# Patient Record
Sex: FEMALE | Race: WHITE | NOT HISPANIC OR LATINO | Employment: FULL TIME | URBAN - METROPOLITAN AREA
[De-identification: names, ages, dates, MRNs, and addresses within clinical notes are randomized per-mention and may not be internally consistent; named-entity substitution may affect disease eponyms.]

---

## 2020-01-12 ENCOUNTER — HOSPITAL ENCOUNTER (EMERGENCY)
Facility: HOSPITAL | Age: 45
Discharge: HOME | End: 2020-01-12
Attending: EMERGENCY MEDICINE | Admitting: EMERGENCY MEDICINE
Payer: COMMERCIAL

## 2020-01-12 ENCOUNTER — APPOINTMENT (EMERGENCY)
Dept: RADIOLOGY | Facility: HOSPITAL | Age: 45
End: 2020-01-12
Payer: COMMERCIAL

## 2020-01-12 VITALS
OXYGEN SATURATION: 96 % | RESPIRATION RATE: 18 BRPM | SYSTOLIC BLOOD PRESSURE: 147 MMHG | HEART RATE: 71 BPM | TEMPERATURE: 98.5 F | DIASTOLIC BLOOD PRESSURE: 60 MMHG

## 2020-01-12 DIAGNOSIS — N39.0 UTI (URINARY TRACT INFECTION): ICD-10-CM

## 2020-01-12 DIAGNOSIS — E86.0 DEHYDRATION: Primary | ICD-10-CM

## 2020-01-12 LAB
ALBUMIN SERPL BCP-MCNC: 3.5 G/DL (ref 3.5–5)
ALP SERPL-CCNC: 103 U/L (ref 46–116)
ALT SERPL W P-5'-P-CCNC: 22 U/L (ref 12–78)
ANION GAP SERPL CALCULATED.3IONS-SCNC: 8 MMOL/L (ref 4–13)
APTT PPP: 25 SECONDS (ref 23–37)
AST SERPL W P-5'-P-CCNC: 15 U/L (ref 5–45)
BACTERIA UR QL AUTO: ABNORMAL /HPF
BASE EX.OXY STD BLDV CALC-SCNC: 81.7 % (ref 60–80)
BASE EXCESS BLDV CALC-SCNC: 0.2 MMOL/L
BASOPHILS # BLD MANUAL: 0 THOUSAND/UL (ref 0–0.1)
BASOPHILS NFR MAR MANUAL: 0 % (ref 0–1)
BILIRUB SERPL-MCNC: 0.2 MG/DL (ref 0.2–1)
BILIRUB UR QL STRIP: NEGATIVE
BUN SERPL-MCNC: 11 MG/DL (ref 5–25)
CALCIUM SERPL-MCNC: 9.1 MG/DL (ref 8.3–10.1)
CHLORIDE SERPL-SCNC: 106 MMOL/L (ref 100–108)
CLARITY UR: CLEAR
CO2 SERPL-SCNC: 28 MMOL/L (ref 21–32)
COLOR UR: YELLOW
CREAT SERPL-MCNC: 0.62 MG/DL (ref 0.6–1.3)
EOSINOPHIL # BLD MANUAL: 0.06 THOUSAND/UL (ref 0–0.4)
EOSINOPHIL NFR BLD MANUAL: 1 % (ref 0–6)
ERYTHROCYTE [DISTWIDTH] IN BLOOD BY AUTOMATED COUNT: 12.8 % (ref 11.6–15.1)
GFR SERPL CREATININE-BSD FRML MDRD: 108 ML/MIN/1.73SQ M
GLUCOSE SERPL-MCNC: 76 MG/DL (ref 65–140)
GLUCOSE UR STRIP-MCNC: NEGATIVE MG/DL
HCO3 BLDV-SCNC: 26.3 MMOL/L (ref 24–30)
HCT VFR BLD AUTO: 47.4 % (ref 34.8–46.1)
HGB BLD-MCNC: 15.8 G/DL (ref 11.5–15.4)
HGB UR QL STRIP.AUTO: NEGATIVE
INR PPP: 0.93 (ref 0.84–1.19)
KETONES UR STRIP-MCNC: NEGATIVE MG/DL
LACTATE SERPL-SCNC: 1.3 MMOL/L (ref 0.5–2)
LEUKOCYTE ESTERASE UR QL STRIP: NEGATIVE
LIPASE SERPL-CCNC: 93 U/L (ref 73–393)
LYMPHOCYTES # BLD AUTO: 1.08 THOUSAND/UL (ref 0.6–4.47)
LYMPHOCYTES # BLD AUTO: 17 % (ref 14–44)
MAGNESIUM SERPL-MCNC: 2 MG/DL (ref 1.6–2.6)
MCH RBC QN AUTO: 29.3 PG (ref 26.8–34.3)
MCHC RBC AUTO-ENTMCNC: 33.3 G/DL (ref 31.4–37.4)
MCV RBC AUTO: 88 FL (ref 82–98)
MONOCYTES # BLD AUTO: 0.32 THOUSAND/UL (ref 0–1.22)
MONOCYTES NFR BLD: 5 % (ref 4–12)
NEUTROPHILS # BLD MANUAL: 4.88 THOUSAND/UL (ref 1.85–7.62)
NEUTS BAND NFR BLD MANUAL: 1 % (ref 0–8)
NEUTS SEG NFR BLD AUTO: 76 % (ref 43–75)
NITRITE UR QL STRIP: POSITIVE
NON-SQ EPI CELLS URNS QL MICRO: ABNORMAL /HPF
NRBC BLD AUTO-RTO: 0 /100 WBCS
NT-PROBNP SERPL-MCNC: 1029 PG/ML
O2 CT BLDV-SCNC: 19.1 ML/DL
PCO2 BLDV: 47.5 MM HG (ref 42–50)
PH BLDV: 7.36 [PH] (ref 7.3–7.4)
PH UR STRIP.AUTO: 5.5 [PH]
PLATELET # BLD AUTO: 349 THOUSANDS/UL (ref 149–390)
PLATELET BLD QL SMEAR: ABNORMAL
PMV BLD AUTO: 9.4 FL (ref 8.9–12.7)
PO2 BLDV: 48.8 MM HG (ref 35–45)
POTASSIUM SERPL-SCNC: 4 MMOL/L (ref 3.5–5.3)
PROT SERPL-MCNC: 8.1 G/DL (ref 6.4–8.2)
PROT UR STRIP-MCNC: NEGATIVE MG/DL
PROTHROMBIN TIME: 12.5 SECONDS (ref 11.6–14.5)
RBC # BLD AUTO: 5.39 MILLION/UL (ref 3.81–5.12)
RBC #/AREA URNS AUTO: ABNORMAL /HPF
SODIUM SERPL-SCNC: 142 MMOL/L (ref 136–145)
SP GR UR STRIP.AUTO: 1.01 (ref 1–1.03)
TOTAL CELLS COUNTED SPEC: 100
TROPONIN I SERPL-MCNC: <0.02 NG/ML
UROBILINOGEN UR QL STRIP.AUTO: 0.2 E.U./DL
WBC # BLD AUTO: 6.34 THOUSAND/UL (ref 4.31–10.16)
WBC #/AREA URNS AUTO: ABNORMAL /HPF

## 2020-01-12 PROCEDURE — 85007 BL SMEAR W/DIFF WBC COUNT: CPT | Performed by: EMERGENCY MEDICINE

## 2020-01-12 PROCEDURE — 83735 ASSAY OF MAGNESIUM: CPT | Performed by: EMERGENCY MEDICINE

## 2020-01-12 PROCEDURE — 99284 EMERGENCY DEPT VISIT MOD MDM: CPT | Performed by: EMERGENCY MEDICINE

## 2020-01-12 PROCEDURE — 85610 PROTHROMBIN TIME: CPT | Performed by: EMERGENCY MEDICINE

## 2020-01-12 PROCEDURE — 84484 ASSAY OF TROPONIN QUANT: CPT | Performed by: EMERGENCY MEDICINE

## 2020-01-12 PROCEDURE — 36415 COLL VENOUS BLD VENIPUNCTURE: CPT | Performed by: EMERGENCY MEDICINE

## 2020-01-12 PROCEDURE — 80053 COMPREHEN METABOLIC PANEL: CPT | Performed by: EMERGENCY MEDICINE

## 2020-01-12 PROCEDURE — 83880 ASSAY OF NATRIURETIC PEPTIDE: CPT | Performed by: EMERGENCY MEDICINE

## 2020-01-12 PROCEDURE — 83605 ASSAY OF LACTIC ACID: CPT | Performed by: EMERGENCY MEDICINE

## 2020-01-12 PROCEDURE — 99285 EMERGENCY DEPT VISIT HI MDM: CPT

## 2020-01-12 PROCEDURE — 87040 BLOOD CULTURE FOR BACTERIA: CPT | Performed by: EMERGENCY MEDICINE

## 2020-01-12 PROCEDURE — 71046 X-RAY EXAM CHEST 2 VIEWS: CPT

## 2020-01-12 PROCEDURE — 96361 HYDRATE IV INFUSION ADD-ON: CPT

## 2020-01-12 PROCEDURE — 81001 URINALYSIS AUTO W/SCOPE: CPT | Performed by: EMERGENCY MEDICINE

## 2020-01-12 PROCEDURE — 93005 ELECTROCARDIOGRAM TRACING: CPT

## 2020-01-12 PROCEDURE — 84145 PROCALCITONIN (PCT): CPT | Performed by: EMERGENCY MEDICINE

## 2020-01-12 PROCEDURE — 85027 COMPLETE CBC AUTOMATED: CPT | Performed by: EMERGENCY MEDICINE

## 2020-01-12 PROCEDURE — 85730 THROMBOPLASTIN TIME PARTIAL: CPT | Performed by: EMERGENCY MEDICINE

## 2020-01-12 PROCEDURE — 82805 BLOOD GASES W/O2 SATURATION: CPT | Performed by: EMERGENCY MEDICINE

## 2020-01-12 PROCEDURE — 96374 THER/PROPH/DIAG INJ IV PUSH: CPT

## 2020-01-12 PROCEDURE — 83690 ASSAY OF LIPASE: CPT | Performed by: EMERGENCY MEDICINE

## 2020-01-12 RX ORDER — ALBUTEROL SULFATE 90 UG/1
2 AEROSOL, METERED RESPIRATORY (INHALATION) ONCE
Status: COMPLETED | OUTPATIENT
Start: 2020-01-12 | End: 2020-01-12

## 2020-01-12 RX ORDER — SODIUM CHLORIDE 9 MG/ML
3 INJECTION INTRAVENOUS AS NEEDED
Status: DISCONTINUED | OUTPATIENT
Start: 2020-01-12 | End: 2020-01-12 | Stop reason: HOSPADM

## 2020-01-12 RX ORDER — CEPHALEXIN 500 MG/1
500 CAPSULE ORAL EVERY 6 HOURS SCHEDULED
Qty: 28 CAPSULE | Refills: 0 | Status: SHIPPED | OUTPATIENT
Start: 2020-01-12 | End: 2020-01-19

## 2020-01-12 RX ORDER — DEXAMETHASONE SODIUM PHOSPHATE 10 MG/ML
10 INJECTION, SOLUTION INTRAMUSCULAR; INTRAVENOUS ONCE
Status: COMPLETED | OUTPATIENT
Start: 2020-01-12 | End: 2020-01-12

## 2020-01-12 RX ORDER — CEPHALEXIN 250 MG/1
500 CAPSULE ORAL ONCE
Status: COMPLETED | OUTPATIENT
Start: 2020-01-12 | End: 2020-01-12

## 2020-01-12 RX ADMIN — SODIUM CHLORIDE 1000 ML: 0.9 INJECTION, SOLUTION INTRAVENOUS at 16:47

## 2020-01-12 RX ADMIN — DEXAMETHASONE SODIUM PHOSPHATE 10 MG: 10 INJECTION, SOLUTION INTRAMUSCULAR; INTRAVENOUS at 20:06

## 2020-01-12 RX ADMIN — ALBUTEROL SULFATE 2 PUFF: 90 AEROSOL, METERED RESPIRATORY (INHALATION) at 20:06

## 2020-01-12 RX ADMIN — CEPHALEXIN 500 MG: 250 CAPSULE ORAL at 20:06

## 2020-01-12 RX ADMIN — SODIUM CHLORIDE 1000 ML: 0.9 INJECTION, SOLUTION INTRAVENOUS at 16:49

## 2020-01-12 NOTE — ED PROVIDER NOTES
History  Chief Complaint   Patient presents with    Weakness - Generalized     Pt presents to ED with co "my lungs hurt really bad, I have chest congestion, weakness and body aches " PT reports symptoms started 2 weeks  53year old female patient presents emergency department for evaluation of two weeks of generalized malaise fatigue and weakness  Patient states that she is off of all of her medications because she has been for medical   Patient states that because of financial difficulties she does not have any food in her house, she does not have access to medications, but she has not been able to care for herself or for her  who she is the primary caretaker for  Patient does smoke cigarettes, states she has not been able to for those as well  Currently the patient is cachectic, ill-appearing, generally unwell, well full septic evaluation done with a differential diagnosis to include but not be limited to pneumonia, dehydration, calorie deficiency secondary to inability to afford food  Patient be given food, during the time of her evaluation, and we will determine discharge based on findings  The patient states she cannot stay in the hospital however because her  is dependent on her for her his care  History provided by:  Patient   used: No    Medical Problem   Severity:  Mild  Onset quality:  Gradual  Timing:  Constant  Progression:  Worsening  Chronicity:  New  Associated symptoms: chest pain, headaches, myalgias and shortness of breath    Associated symptoms: no ear pain, no fever, no rhinorrhea and no sore throat        Prior to Admission Medications   Prescriptions Last Dose Informant Patient Reported? Taking?   naproxen (NAPROSYN) 500 mg tablet   No No   Sig: Take 1 tablet by mouth 2 (two) times a day with meals      Facility-Administered Medications: None       History reviewed  No pertinent past medical history      Past Surgical History:   Procedure Laterality Date    HYSTERECTOMY      KNEE SURGERY      LEG SURGERY         History reviewed  No pertinent family history  I have reviewed and agree with the history as documented  Social History     Tobacco Use    Smoking status: Current Every Day Smoker     Packs/day: 0 25     Types: Cigarettes    Smokeless tobacco: Never Used   Substance Use Topics    Alcohol use: No    Drug use: No        Review of Systems   Constitutional: Negative for fever  HENT: Negative for ear pain, rhinorrhea and sore throat  Respiratory: Positive for shortness of breath  Cardiovascular: Positive for chest pain  Musculoskeletal: Positive for myalgias  Neurological: Positive for headaches  All other systems reviewed and are negative  Physical Exam  Physical Exam   Constitutional: She is oriented to person, place, and time  She appears well-developed and well-nourished  She appears cachectic  She has a sickly appearance  HENT:   Head: Normocephalic and atraumatic  Right Ear: External ear normal    Left Ear: External ear normal    Eyes: Conjunctivae and EOM are normal    Neck: No JVD present  No tracheal deviation present  No thyromegaly present  Cardiovascular: Normal rate  Pulmonary/Chest: Effort normal and breath sounds normal  No stridor  Abdominal: Soft  She exhibits no distension and no mass  There is no tenderness  There is no guarding  No hernia  Musculoskeletal: Normal range of motion  She exhibits no edema, tenderness or deformity  Lymphadenopathy:     She has no cervical adenopathy  Neurological: She is alert and oriented to person, place, and time  Skin: Skin is warm  No rash noted  No erythema  No pallor  Psychiatric: She has a normal mood and affect  Her behavior is normal    Nursing note and vitals reviewed        Vital Signs  ED Triage Vitals [01/12/20 1457]   Temperature Pulse Respirations Blood Pressure SpO2   98 5 °F (36 9 °C) 77 20 169/84 97 %      Temp Source Heart Rate Source Patient Position - Orthostatic VS BP Location FiO2 (%)   Oral Monitor Sitting Left arm --      Pain Score       --           Vitals:    01/12/20 1845 01/12/20 1900 01/12/20 1930 01/12/20 2000   BP: 149/85 136/63 137/67 147/60   Pulse: 68 65 65 71   Patient Position - Orthostatic VS:             Visual Acuity      ED Medications  Medications   sodium chloride 0 9 % bolus 1,000 mL (0 mL Intravenous Stopped 1/12/20 2104)     Followed by   sodium chloride 0 9 % bolus 1,000 mL (0 mL Intravenous Stopped 1/12/20 1856)   cephalexin (KEFLEX) capsule 500 mg (500 mg Oral Given 1/12/20 2006)   dexamethasone (PF) (DECADRON) injection 10 mg (10 mg Intravenous Given 1/12/20 2006)   albuterol (PROVENTIL HFA,VENTOLIN HFA) inhaler 2 puff (2 puffs Inhalation Given 1/12/20 2006)       Diagnostic Studies  Results Reviewed     Procedure Component Value Units Date/Time    Procalcitonin [[id number]]  (Normal) Collected:  01/12/20 1644    Lab Status:  Final result Specimen:  Blood from Arm, Right Updated:  01/13/20 0016     Procalcitonin <0 05 ng/ml     Blood culture [[id number]] Collected:  01/12/20 1644    Lab Status:  Preliminary result Specimen:  Blood from Arm, Right Updated:  01/13/20 0001     Blood Culture Received in Microbiology Lab  Culture in Progress  Blood culture [[id number]] Collected:  01/12/20 1644    Lab Status:  Preliminary result Specimen:  Blood from Arm, Left Updated:  01/13/20 0001     Blood Culture Received in Microbiology Lab  Culture in Progress      Urine Microscopic [[id number]]  (Abnormal) Collected:  01/12/20 1823    Lab Status:  Final result Specimen:  Urine, Clean Catch Updated:  01/12/20 1840     RBC, UA None Seen /hpf      WBC, UA 1-2 /hpf      Epithelial Cells Occasional /hpf      Bacteria, UA Moderate /hpf     CBC and differential [[id number]]  (Abnormal) Collected:  01/12/20 1644    Lab Status:  Final result Specimen:  Blood from Arm, Right Updated:  01/12/20 1837     WBC 6 34 Thousand/uL      RBC 5 39 Million/uL      Hemoglobin 15 8 g/dL      Hematocrit 47 4 %      MCV 88 fL      MCH 29 3 pg      MCHC 33 3 g/dL      RDW 12 8 %      MPV 9 4 fL      Platelets 782 Thousands/uL      nRBC 0 /100 WBCs     Narrative: This is an appended report  These results have been appended to a previously verified report  Urinalysis with culture and sensitivity reflex [[id number]]  (Abnormal) Collected:  01/12/20 1823    Lab Status:  Final result Specimen:  Urine, Clean Catch Updated:  01/12/20 1829     Color, UA Yellow     Clarity, UA Clear     Specific Gravity, UA 1 015     pH, UA 5 5     Leukocytes, UA Negative     Nitrite, UA Positive     Protein, UA Negative mg/dl      Glucose, UA Negative mg/dl      Ketones, UA Negative mg/dl      Urobilinogen, UA 0 2 E U /dl      Bilirubin, UA Negative     Blood, UA Negative    Protime-INR [[id number]]  (Normal) Collected:  01/12/20 1644    Lab Status:  Final result Specimen:  Blood from Arm, Right Updated:  01/12/20 1826     Protime 12 5 seconds      INR 0 93    APTT [[id number]]  (Normal) Collected:  01/12/20 1644    Lab Status:  Final result Specimen:  Blood from Arm, Right Updated:  01/12/20 1826     PTT 25 seconds     B-type natriuretic peptide [[id number]]  (Abnormal) Collected:  01/12/20 1644    Lab Status:  Final result Specimen:  Blood from Arm, Right Updated:  01/12/20 1819     NT-proBNP 1,029 pg/mL     Lipase [[id number]]  (Normal) Collected:  01/12/20 1644    Lab Status:  Final result Specimen:  Blood from Arm, Right Updated:  01/12/20 1819     Lipase 93 u/L     Magnesium [[id number]]  (Normal) Collected:  01/12/20 1644    Lab Status:  Final result Specimen:  Blood from Arm, Right Updated:  01/12/20 1818     Magnesium 2 0 mg/dL     Lactate Blood [[id number]]  (Normal) Collected:  01/12/20 1750    Lab Status:  Final result Specimen:  Blood Updated:  01/12/20 1814     LACTIC ACID 1 3 mmol/L     Narrative:       Result may be elevated if tourniquet was used during collection      Troponin I [[id number]]  (Normal) Collected:  01/12/20 1644    Lab Status:  Final result Specimen:  Blood from Arm, Right Updated:  01/12/20 1814     Troponin I <0 02 ng/mL     Comprehensive metabolic panel [[id number]] Collected:  01/12/20 1644    Lab Status:  Final result Specimen:  Blood from Arm, Right Updated:  01/12/20 1811     Sodium 142 mmol/L      Potassium 4 0 mmol/L      Chloride 106 mmol/L      CO2 28 mmol/L      ANION GAP 8 mmol/L      BUN 11 mg/dL      Creatinine 0 62 mg/dL      Glucose 76 mg/dL      Calcium 9 1 mg/dL      AST 15 U/L      ALT 22 U/L      Alkaline Phosphatase 103 U/L      Total Protein 8 1 g/dL      Albumin 3 5 g/dL      Total Bilirubin 0 20 mg/dL      eGFR 108 ml/min/1 73sq m     Narrative:       [place] guidelines for Chronic Kidney Disease (CKD):     Stage 1 with normal or high GFR (GFR > 90 mL/min/1 73 square meters)    Stage 2 Mild CKD (GFR = 60-89 mL/min/1 73 square meters)    Stage 3A Moderate CKD (GFR = 45-59 mL/min/1 73 square meters)    Stage 3B Moderate CKD (GFR = 30-44 mL/min/1 73 square meters)    Stage 4 Severe CKD (GFR = 15-29 mL/min/1 73 square meters)    Stage 5 End Stage CKD (GFR <15 mL/min/1 73 square meters)  Note: GFR calculation is accurate only with a steady state creatinine    Blood gas, venous [[id number]]  (Abnormal) Collected:  01/12/20 1644    Lab Status:  Final result Specimen:  Blood from Arm, Right Updated:  01/12/20 1753     pH, [place] 7 361     pCO2, [place] 47 5 mm Hg      pO2, [place]an 48 8 mm Hg      HCO3, [place] 26 3 mmol/L      Base Excess, Beltran 0 2 mmol/L      O2 Content, [place] 19 1 ml/dL      O2 HGB, VENOUS 81 7 %                  XR chest 2 views    (Results Pending)              Procedures  Procedures         ED Course                               MDM  Number of Diagnoses or Management Options  Dehydration: new and requires workup  UTI (urinary tract infection): new and requires workup     Amount and/or Complexity of Data Reviewed  Clinical lab tests: ordered and reviewed  Tests in the radiology section of CPT®: reviewed and ordered  Decide to obtain previous medical records or to obtain history from someone other than the patient: yes  Review and summarize past medical records: yes    Patient Progress  Patient progress: stable        Disposition  Final diagnoses:   Dehydration   UTI (urinary tract infection)     Time reflects when diagnosis was documented in both MDM as applicable and the Disposition within this note     Time User Action Codes Description Comment    1/12/2020  7:48 PM Lewis Pack Add [E86 0] Dehydration     1/12/2020  7:48 PM Lewis Pack Add [N39 0] UTI (urinary tract infection)       ED Disposition     ED Disposition Condition Date/Time Comment    Discharge Stable Sun Jan 12, 2020  7:48 PM Mis Arreguin discharge to home/self care  Follow-up Information     Follow up With Specialties Details Why Contact Info Additional Information    [facility] Emergency Medicine  As needed [address] [address] ED, [address] [place], [place], [facility]          Discharge Medication List as of 1/12/2020  7:49 PM      START taking these medications    Details   cephalexin (KEFLEX) 500 mg capsule Take 1 capsule (500 mg total) by mouth every 6 (six) hours for 7 days, Starting Sun 1/12/2020, Until Sun 1/19/2020, Print         CONTINUE these medications which have NOT CHANGED    Details   naproxen (NAPROSYN) 500 mg tablet Take 1 tablet by mouth 2 (two) times a day with meals, Starting 11/19/2016, Until Discontinued, Print           No discharge procedures on file      ED Provider  Electronically Signed by           Milad Andres DO  01/13/20 0128

## 2020-01-13 LAB
ATRIAL RATE: 62 BPM
P AXIS: 53 DEGREES
PR INTERVAL: 150 MS
PROCALCITONIN SERPL-MCNC: <0.05 NG/ML
QRS AXIS: 27 DEGREES
QRSD INTERVAL: 146 MS
QT INTERVAL: 470 MS
QTC INTERVAL: 477 MS
T WAVE AXIS: 81 DEGREES
VENTRICULAR RATE: 62 BPM

## 2020-01-13 PROCEDURE — 93010 ELECTROCARDIOGRAM REPORT: CPT | Performed by: INTERNAL MEDICINE

## 2020-01-17 LAB
BACTERIA BLD CULT: NORMAL
BACTERIA BLD CULT: NORMAL

## 2020-01-28 ENCOUNTER — OFFICE VISIT (OUTPATIENT)
Dept: URGENT CARE | Facility: CLINIC | Age: 45
End: 2020-01-28
Payer: COMMERCIAL

## 2020-01-28 VITALS
BODY MASS INDEX: 20.49 KG/M2 | OXYGEN SATURATION: 96 % | TEMPERATURE: 98.1 F | HEIGHT: 64 IN | RESPIRATION RATE: 18 BRPM | DIASTOLIC BLOOD PRESSURE: 80 MMHG | HEART RATE: 90 BPM | SYSTOLIC BLOOD PRESSURE: 146 MMHG | WEIGHT: 120 LBS

## 2020-01-28 DIAGNOSIS — N76.6 GENITAL LABIAL ULCER: ICD-10-CM

## 2020-01-28 DIAGNOSIS — J02.9 SORE THROAT: Primary | ICD-10-CM

## 2020-01-28 LAB — S PYO AG THROAT QL: NEGATIVE

## 2020-01-28 PROCEDURE — 87070 CULTURE OTHR SPECIMN AEROBIC: CPT | Performed by: PHYSICIAN ASSISTANT

## 2020-01-28 PROCEDURE — 87880 STREP A ASSAY W/OPTIC: CPT | Performed by: PHYSICIAN ASSISTANT

## 2020-01-28 PROCEDURE — 99214 OFFICE O/P EST MOD 30 MIN: CPT | Performed by: PHYSICIAN ASSISTANT

## 2020-01-28 RX ORDER — VALACYCLOVIR HYDROCHLORIDE 1 G/1
1000 TABLET, FILM COATED ORAL 2 TIMES DAILY
Qty: 20 TABLET | Refills: 0 | Status: SHIPPED | OUTPATIENT
Start: 2020-01-28 | End: 2020-07-24
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
## 2020-01-28 NOTE — PATIENT INSTRUCTIONS
1  Sore throat/Vaginal ulcer  -Rapid strep is negative  -Throat culture is pending  -Vaginal ulcer appears consistent with herpes lesion  -take anti-viral medicine as directed  -F/U with an OB/Gyn for re-evaluation of vaginal lesion   Also follow-up with PCP for re-evaluation as well    Go to ER with worsening symptoms or any signs of distress

## 2020-01-30 LAB — BACTERIA THROAT CULT: NORMAL

## 2020-02-04 ENCOUNTER — TELEPHONE (OUTPATIENT)
Dept: URGENT CARE | Facility: CLINIC | Age: 45
End: 2020-02-04

## 2020-02-04 NOTE — TELEPHONE ENCOUNTER
pt called requesting results of throat culture from 1/28/2020 care now visit  informed pt results were negative

## 2020-02-29 ENCOUNTER — HOSPITAL ENCOUNTER (EMERGENCY)
Facility: HOSPITAL | Age: 45
Discharge: HOME | End: 2020-02-29
Attending: EMERGENCY MEDICINE
Payer: COMMERCIAL

## 2020-02-29 VITALS
SYSTOLIC BLOOD PRESSURE: 137 MMHG | RESPIRATION RATE: 18 BRPM | OXYGEN SATURATION: 99 % | BODY MASS INDEX: 19.9 KG/M2 | TEMPERATURE: 98.1 F | HEART RATE: 72 BPM | WEIGHT: 115.96 LBS | DIASTOLIC BLOOD PRESSURE: 68 MMHG

## 2020-02-29 DIAGNOSIS — F32.A DEPRESSION: Primary | ICD-10-CM

## 2020-02-29 DIAGNOSIS — Z00.8 ENCOUNTER FOR PSYCHOLOGICAL EVALUATION: ICD-10-CM

## 2020-02-29 DIAGNOSIS — L08.9 INFECTION OF SKIN OF TOES: ICD-10-CM

## 2020-02-29 LAB — ETHANOL EXG-MCNC: 0 MG/DL

## 2020-02-29 PROCEDURE — 99284 EMERGENCY DEPT VISIT MOD MDM: CPT | Performed by: EMERGENCY MEDICINE

## 2020-02-29 PROCEDURE — 99284 EMERGENCY DEPT VISIT MOD MDM: CPT

## 2020-02-29 PROCEDURE — 82075 ASSAY OF BREATH ETHANOL: CPT | Performed by: EMERGENCY MEDICINE

## 2020-02-29 RX ORDER — CEPHALEXIN 250 MG/1
500 CAPSULE ORAL ONCE
Status: COMPLETED | OUTPATIENT
Start: 2020-02-29 | End: 2020-02-29

## 2020-02-29 RX ORDER — OLANZAPINE 10 MG/1
10 TABLET, ORALLY DISINTEGRATING ORAL ONCE
Status: COMPLETED | OUTPATIENT
Start: 2020-02-29 | End: 2020-02-29

## 2020-02-29 RX ORDER — CEPHALEXIN 500 MG/1
500 CAPSULE ORAL EVERY 8 HOURS SCHEDULED
Qty: 30 CAPSULE | Refills: 0 | Status: SHIPPED | OUTPATIENT
Start: 2020-02-29 | End: 2020-03-10

## 2020-02-29 RX ADMIN — OLANZAPINE 10 MG: 10 TABLET, ORALLY DISINTEGRATING ORAL at 11:24

## 2020-02-29 RX ADMIN — CEPHALEXIN 500 MG: 250 CAPSULE ORAL at 11:24

## 2020-02-29 NOTE — ED NOTES
CW received original 302 petitioned by staff of [address] [place] [address]       TDS, CW
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
## 2020-02-29 NOTE — ED NOTES
CW placed call to [facility] and spoke w/Gayathri  CW inquired if pt is able to return to [facility]  As per Kylah Garrido, pt may return to [facility] if 302 is denied  CW spoke w/pt and Dr Ap Corona will deny the 36 and pt may be discharged to [facility]  12:55 - CW placed call to [facility] and spoke timur/Gayathri and Alia  Pt will be picked up in aprox 1/3 hr and return to [facility]      TDS, CW

## 2020-02-29 NOTE — DISCHARGE INSTRUCTIONS
Please take Keflex with the bactrim already prescribed for treatment of toe infection    Return immediately if thoughts of harming yourself or anyone else

## 2020-02-29 NOTE — ED NOTES
Pt  Belongings:    1 Oketo  5 Jeans  3 Leggings  8 Long Sleeves  1 T-shirt  2 Tank Tops  5 Panties  5 Socks  1 Sweatpant  2 Bras  1 Tanktop  1 Hat  1 pair of gloves  1 Notebook  1 Picture  1 Purse  1 Pocket sized calender  Hair [employer]arettes and Hair Ties  [employer]  Social Security Card  Slippers  1 Sweater       Leslie Francis  02/29/20 2092

## 2020-02-29 NOTE — ED NOTES
Patient stated at this time that she does not have to urinate so she will not provide a this time       Sena Soto  02/29/20 1125

## 2020-02-29 NOTE — ED PROVIDER NOTES
History  Chief Complaint   Patient presents with    Psychiatric Evaluation     pt presents via EMS from [facility], after getting into argument with staff over medications, pt states "I want to go be with my mom and dad"      44F presents sent in from [facility]  She got into an argument with staff because she did not want to take her psychiatric medicines and was angry that staff did not provide to her yeast infection medicine that she wanted  Patient reportedly told staff that she wants to be with her mom and dad  Staff took this as suicidal remarks because her mother and father have passed away  However patient states that this was not a suicidal remark  Patient simply misses her parents and wishes that they were around so she could be with her because she feels very lonely  Her loneliness is causing her depression and patient admits to feeling depressed  However she adamantly denies any suicidal thoughts, no homicidal thoughts, no auditory or visual hallucinations  Prior to Admission Medications   Prescriptions Last Dose Informant Patient Reported? Taking?   valACYclovir (VALTREX) 1,000 mg tablet   No No   Sig: Take 1 tablet (1,000 mg total) by mouth 2 (two) times a day for 10 days      Facility-Administered Medications: None       Past Medical History:   Diagnosis Date    Anxiety     Bipolar disease, manic ([facility] [address] )     Depression     Hypertension     [facility]        Past Surgical History:   Procedure Laterality Date     SECTION      x2    HYSTERECTOMY      KNEE SURGERY      LEG SURGERY         History reviewed  No pertinent family history  I have reviewed and agree with the history as documented      E-Cigarette/Vaping     E-Cigarette/Vaping Substances     Social History     Tobacco Use    Smoking status: Current Every Day Smoker     Packs/day: 0 25     Types: Cigarettes    Smokeless tobacco: Never Used   Substance Use Topics    Alcohol use: No    Drug use: Yes     Types: Methamphetamines     Comment: Pt reports having used aprox 4 days ago       Review of Systems   Constitutional: Negative for chills and fever  HENT: Negative for congestion and rhinorrhea  Respiratory: Negative for chest tightness and shortness of breath  Cardiovascular: Negative for chest pain and leg swelling  Gastrointestinal: Negative for abdominal pain, nausea and vomiting  Musculoskeletal: Negative for back pain and neck pain  Skin: Positive for rash (Rash on toes that appears consistent with cellulitis  )  Negative for wound  Neurological: Negative for dizziness and headaches  Psychiatric/Behavioral: Positive for behavioral problems, dysphoric mood and sleep disturbance  Negative for agitation, confusion, decreased concentration, hallucinations, self-injury and suicidal ideas  The patient is nervous/anxious  Physical Exam  Physical Exam   Constitutional: She is oriented to person, place, and time  She appears well-developed and well-nourished  No distress  HENT:   Head: Normocephalic and atraumatic  Mouth/Throat: Oropharynx is clear and moist    Eyes: Conjunctivae and EOM are normal    Neck: Normal range of motion  Cardiovascular: Normal rate and regular rhythm  Pulmonary/Chest: Effort normal  No respiratory distress  Abdominal: Soft  There is no tenderness  Musculoskeletal: Normal range of motion  Neurological: She is alert and oriented to person, place, and time  No cranial nerve deficit  Skin: Skin is warm and dry  She is not diaphoretic  No pallor  Psychiatric: Judgment normal  Her speech is rapid and/or pressured  She is agitated and withdrawn  She is not actively hallucinating  Thought content is not paranoid and not delusional  Cognition and memory are not impaired  She exhibits a depressed mood  She expresses no homicidal and no suicidal ideation  She expresses no suicidal plans and no homicidal plans     Patient appears depressed, has poor eye contact  Tearful  Anxious  However she seems to speak forthcoming about her emotions  She denies SI/HI/AH/VH  Does not appear to be responding to nonexistant external stimuli  She is attentive  Vitals reviewed  Vital Signs  ED Triage Vitals   Temperature Pulse Respirations Blood Pressure SpO2   02/29/20 1054 02/29/20 1046 02/29/20 1046 02/29/20 1046 02/29/20 1046   98 1 °F (36 7 °C) 72 18 137/68 99 %      Temp Source Heart Rate Source Patient Position - Orthostatic VS BP Location FiO2 (%)   02/29/20 1054 02/29/20 1046 02/29/20 1046 02/29/20 1046 --   Oral Monitor Sitting Right arm       Pain Score       --                  Vitals:    02/29/20 1046   BP: 137/68   Pulse: 72   Patient Position - Orthostatic VS: Sitting         Visual Acuity      ED Medications  Medications   OLANZapine (ZyPREXA ZYDIS) dispersible tablet 10 mg (10 mg Oral Given 2/29/20 1124)   cephalexin (KEFLEX) capsule 500 mg (500 mg Oral Given 2/29/20 1124)       Diagnostic Studies  Results Reviewed     Procedure Component Value Units Date/Time    POCT alcohol breath test [[id number]]  (Normal) Resulted:  02/29/20 1157    Lab Status:  Final result Updated:  02/29/20 1157     EXTBreath Alcohol 0 000           Patient refuses to provide urinalysis  She however comes from [facility] where she does not have access to illicit drugs , and she presents clinically not under the influence of drugs or alcohol       No orders to display              Procedures  Procedures         ED Course  ED Course as of Mar 13 1849   Sat Feb 29, 2020   1308 Patient does not want to sign herself in for voluntary psychiatric treatment  I have no grounds to keep this patient against her will  As such patient will be discharged back to [facility] who were in agreement to take this patient back to outpatient care  1309 Will give Keflex for toe infection                                    MDM  Number of Diagnoses or Management Options  Depression: harming herself or anyone else, or the infection on your toe seems to be worsening  [address] [facility] [id number]  [address] [place] [address] ED, [address] [place], [place], [address] [facility]   call to find a PCP in the area for medical follow up [phone]             Discharge Medication List as of 2/29/2020  1:13 PM      START taking these medications    Details   cephalexin (KEFLEX) 500 mg capsule Take 1 capsule (500 mg total) by mouth every 8 (eight) hours for 10 days, Starting Sat 2/29/2020, Until Tue 3/10/2020, Print         CONTINUE these medications which have NOT CHANGED    Details   valACYclovir (VALTREX) 1,000 mg tablet Take 1 tablet (1,000 mg total) by mouth 2 (two) times a day for 10 days, Starting Tue 1/28/2020, Until Fri 2/7/2020, Normal           No discharge procedures on file      PDMP Review     None          ED Provider  Electronically Signed by           Reba Smith DO  03/13/20 8313 [facility],   03/13/20 0727

## 2020-04-06 ENCOUNTER — DOCUMENTATION (OUTPATIENT)
Dept: FAMILY MEDICINE CLINIC | Facility: CLINIC | Age: 45
End: 2020-04-06

## 2020-04-06 RX ORDER — TOPIRAMATE 100 MG/1
100 TABLET, FILM COATED ORAL 2 TIMES DAILY
COMMUNITY
End: 2020-04-10 | Stop reason: SDUPTHER

## 2020-04-06 RX ORDER — PAROXETINE HYDROCHLORIDE 40 MG/1
40 TABLET, FILM COATED ORAL EVERY MORNING
COMMUNITY
End: 2020-04-10 | Stop reason: SDUPTHER

## 2020-04-06 RX ORDER — MIRTAZAPINE 7.5 MG/1
7.5 TABLET, FILM COATED ORAL
COMMUNITY
End: 2020-07-24

## 2020-04-06 RX ORDER — QUETIAPINE FUMARATE 50 MG/1
50 TABLET, FILM COATED ORAL
COMMUNITY
End: 2020-04-10

## 2020-04-10 ENCOUNTER — TELEMEDICINE (OUTPATIENT)
Dept: FAMILY MEDICINE CLINIC | Facility: CLINIC | Age: 45
End: 2020-04-10
Payer: COMMERCIAL

## 2020-04-10 DIAGNOSIS — K21.9 GASTROESOPHAGEAL REFLUX DISEASE WITHOUT ESOPHAGITIS: ICD-10-CM

## 2020-04-10 DIAGNOSIS — F33.41 RECURRENT MAJOR DEPRESSIVE DISORDER, IN PARTIAL REMISSION (HCC): Primary | ICD-10-CM

## 2020-04-10 PROCEDURE — G2012 BRIEF CHECK IN BY MD/QHP: HCPCS | Performed by: FAMILY MEDICINE

## 2020-04-10 RX ORDER — OLANZAPINE 10 MG/1
10 TABLET ORAL
Qty: 90 TABLET | Refills: 0 | Status: SHIPPED | OUTPATIENT
Start: 2020-04-10 | End: 2020-07-24 | Stop reason: SDUPTHER

## 2020-04-10 RX ORDER — PAROXETINE HYDROCHLORIDE 40 MG/1
40 TABLET, FILM COATED ORAL EVERY MORNING
Qty: 90 TABLET | Refills: 0 | Status: SHIPPED | OUTPATIENT
Start: 2020-04-10 | End: 2020-07-24 | Stop reason: SDUPTHER

## 2020-04-10 RX ORDER — TOPIRAMATE 100 MG/1
100 TABLET, FILM COATED ORAL 2 TIMES DAILY
Qty: 180 TABLET | Refills: 0 | Status: SHIPPED | OUTPATIENT
Start: 2020-04-10 | End: 2020-11-11 | Stop reason: SDUPTHER

## 2020-04-10 RX ORDER — OMEPRAZOLE 40 MG/1
40 CAPSULE, DELAYED RELEASE ORAL
Qty: 90 CAPSULE | Refills: 0 | Status: SHIPPED | OUTPATIENT
Start: 2020-04-10 | End: 2020-07-24 | Stop reason: SDUPTHER

## 2020-07-24 ENCOUNTER — OFFICE VISIT (OUTPATIENT)
Dept: FAMILY MEDICINE CLINIC | Facility: CLINIC | Age: 45
End: 2020-07-24
Payer: COMMERCIAL

## 2020-07-24 VITALS
SYSTOLIC BLOOD PRESSURE: 180 MMHG | BODY MASS INDEX: 26.12 KG/M2 | DIASTOLIC BLOOD PRESSURE: 90 MMHG | TEMPERATURE: 99.2 F | HEIGHT: 64 IN | OXYGEN SATURATION: 97 % | HEART RATE: 80 BPM | WEIGHT: 153 LBS | RESPIRATION RATE: 14 BRPM

## 2020-07-24 DIAGNOSIS — Z00.00 WELL ADULT EXAM: ICD-10-CM

## 2020-07-24 DIAGNOSIS — F31.9 BIPOLAR 1 DISORDER (HCC): ICD-10-CM

## 2020-07-24 DIAGNOSIS — K21.9 GASTROESOPHAGEAL REFLUX DISEASE WITHOUT ESOPHAGITIS: ICD-10-CM

## 2020-07-24 DIAGNOSIS — F33.41 RECURRENT MAJOR DEPRESSIVE DISORDER, IN PARTIAL REMISSION (HCC): ICD-10-CM

## 2020-07-24 DIAGNOSIS — I10 ESSENTIAL HYPERTENSION: ICD-10-CM

## 2020-07-24 DIAGNOSIS — R30.0 DYSURIA: Primary | ICD-10-CM

## 2020-07-24 DIAGNOSIS — E78.2 MIXED HYPERLIPIDEMIA: ICD-10-CM

## 2020-07-24 DIAGNOSIS — Z72.0 TOBACCO ABUSE: ICD-10-CM

## 2020-07-24 DIAGNOSIS — N76.6 GENITAL LABIAL ULCER: ICD-10-CM

## 2020-07-24 DIAGNOSIS — R53.83 OTHER FATIGUE: ICD-10-CM

## 2020-07-24 DIAGNOSIS — F20.9 SCHIZOPHRENIA, UNSPECIFIED TYPE (HCC): ICD-10-CM

## 2020-07-24 LAB
BACTERIA UR QL AUTO: ABNORMAL /HPF
BILIRUB UR QL STRIP: NEGATIVE
CLARITY UR: ABNORMAL
COLOR UR: YELLOW
GLUCOSE UR STRIP-MCNC: NEGATIVE MG/DL
HGB UR QL STRIP.AUTO: NEGATIVE
HYALINE CASTS #/AREA URNS LPF: ABNORMAL /LPF
KETONES UR STRIP-MCNC: NEGATIVE MG/DL
LEUKOCYTE ESTERASE UR QL STRIP: ABNORMAL
NITRITE UR QL STRIP: NEGATIVE
NON-SQ EPI CELLS URNS QL MICRO: ABNORMAL /HPF
PH UR STRIP.AUTO: 7.5 [PH]
PROT UR STRIP-MCNC: NEGATIVE MG/DL
RBC #/AREA URNS AUTO: ABNORMAL /HPF
SL AMB  POCT GLUCOSE, UA: NORMAL
SL AMB LEUKOCYTE ESTERASE,UA: NORMAL
SL AMB POCT BILIRUBIN,UA: NORMAL
SL AMB POCT BLOOD,UA: NORMAL
SL AMB POCT CLARITY,UA: CLEAR
SL AMB POCT COLOR,UA: YELLOW
SL AMB POCT KETONES,UA: NORMAL
SL AMB POCT NITRITE,UA: NORMAL
SL AMB POCT PH,UA: 7.5
SL AMB POCT SPECIFIC GRAVITY,UA: 1.01
SL AMB POCT URINE PROTEIN: NORMAL
SL AMB POCT UROBILINOGEN: 0.2
SP GR UR STRIP.AUTO: 1.01 (ref 1–1.03)
UROBILINOGEN UR QL STRIP.AUTO: 0.2 E.U./DL
WBC #/AREA URNS AUTO: ABNORMAL /HPF

## 2020-07-24 PROCEDURE — 3077F SYST BP >= 140 MM HG: CPT | Performed by: FAMILY MEDICINE

## 2020-07-24 PROCEDURE — 81002 URINALYSIS NONAUTO W/O SCOPE: CPT | Performed by: FAMILY MEDICINE

## 2020-07-24 PROCEDURE — 3008F BODY MASS INDEX DOCD: CPT | Performed by: FAMILY MEDICINE

## 2020-07-24 PROCEDURE — 99396 PREV VISIT EST AGE 40-64: CPT | Performed by: FAMILY MEDICINE

## 2020-07-24 PROCEDURE — 3080F DIAST BP >= 90 MM HG: CPT | Performed by: FAMILY MEDICINE

## 2020-07-24 PROCEDURE — 81001 URINALYSIS AUTO W/SCOPE: CPT | Performed by: FAMILY MEDICINE

## 2020-07-24 RX ORDER — OLANZAPINE 10 MG/1
10 TABLET ORAL
Qty: 90 TABLET | Refills: 0 | Status: SHIPPED | OUTPATIENT
Start: 2020-07-24 | End: 2020-11-11 | Stop reason: SDUPTHER

## 2020-07-24 RX ORDER — QUETIAPINE FUMARATE 50 MG/1
50 TABLET, FILM COATED ORAL
Qty: 90 TABLET | Refills: 1 | Status: SHIPPED | OUTPATIENT
Start: 2020-07-24 | End: 2020-11-11 | Stop reason: SDUPTHER

## 2020-07-24 RX ORDER — QUETIAPINE FUMARATE 50 MG/1
50 TABLET, FILM COATED ORAL
COMMUNITY
End: 2020-07-24 | Stop reason: SDUPTHER

## 2020-07-24 RX ORDER — VALACYCLOVIR HYDROCHLORIDE 1 G/1
1000 TABLET, FILM COATED ORAL DAILY
Qty: 90 TABLET | Refills: 1 | Status: SHIPPED | OUTPATIENT
Start: 2020-07-24 | End: 2021-10-28 | Stop reason: HOSPADM

## 2020-07-24 RX ORDER — OMEPRAZOLE 40 MG/1
40 CAPSULE, DELAYED RELEASE ORAL
Qty: 90 CAPSULE | Refills: 0 | Status: SHIPPED | OUTPATIENT
Start: 2020-07-24 | End: 2020-11-11 | Stop reason: SDUPTHER

## 2020-07-24 RX ORDER — VALSARTAN 160 MG/1
160 TABLET ORAL DAILY
Qty: 30 TABLET | Refills: 1 | Status: SHIPPED | OUTPATIENT
Start: 2020-07-24 | End: 2020-09-21

## 2020-07-24 RX ORDER — PAROXETINE HYDROCHLORIDE 40 MG/1
40 TABLET, FILM COATED ORAL EVERY MORNING
Qty: 90 TABLET | Refills: 0 | Status: SHIPPED | OUTPATIENT
Start: 2020-07-24 | End: 2020-11-08

## 2020-07-24 NOTE — PROGRESS NOTES
BMI Counseling: Body mass index is 26 26 kg/m²  The BMI is above normal  Nutrition recommendations include decreasing portion sizes, encouraging healthy choices of fruits and vegetables, decreasing fast food intake, consuming healthier snacks, limiting drinks that contain sugar, moderation in carbohydrate intake, increasing intake of lean protein, reducing intake of saturated and trans fat and reducing intake of cholesterol  No pharmacotherapy was ordered  Patient referred to PCP due to patient being overweight         Assessment/Plan:         Problem List Items Addressed This Visit     None      Visit Diagnoses     Dysuria    -  Primary    Relevant Orders    POCT urine dip (Completed)    UA (URINE) with reflex to Scope    Ambulatory referral to Urogynecology    Recurrent major depressive disorder, in partial remission (HCC)        Relevant Medications    OLANZapine (ZyPREXA) 10 mg tablet    PARoxetine (PAXIL) 40 MG tablet    QUEtiapine (SEROquel) 50 mg tablet    Genital labial ulcer        Relevant Medications    valACYclovir (VALTREX) 1,000 mg tablet    Gastroesophageal reflux disease without esophagitis        Relevant Medications    omeprazole (PriLOSEC) 40 MG capsule    Other Relevant Orders    UA (URINE) with reflex to Scope    Bipolar 1 disorder (HCC)        Relevant Medications    OLANZapine (ZyPREXA) 10 mg tablet    PARoxetine (PAXIL) 40 MG tablet    QUEtiapine (SEROquel) 50 mg tablet    Schizophrenia, unspecified type (HCC)        Relevant Medications    OLANZapine (ZyPREXA) 10 mg tablet    PARoxetine (PAXIL) 40 MG tablet    QUEtiapine (SEROquel) 50 mg tablet    Essential hypertension        Relevant Medications    valsartan (DIOVAN) 160 mg tablet    Other fatigue        Relevant Orders    CBC and differential    Comprehensive metabolic panel    UA w Reflex to Microscopic w Reflex to Culture    TSH + Free T4    Uric acid    Magnesium    Mixed hyperlipidemia        Relevant Orders    Lipid Panel with Direct LDL reflex            Subjective:      Patient ID: Amaury Ruano is a 50 y o  female  Pt here for physical exam and has high BP and has had this  For a while  Pt also with bipoalr and schizophrenia  Paranoia  Pt als niurka russo of multiple trauma and  Pain  The following portions of the patient's history were reviewed and updated as appropriate:   She has a past medical history of Anxiety, Bipolar disease, manic ([address] ), Depression, Hypertension, and Schizophrenia ([facility] [address] )  ,  does not have a problem list on file  ,   has a past surgical history that includes Knee surgery (Left); Leg Surgery (Right); Hysterectomy;  section; and Neuroplasty / transposition median nerve at carpal tunnel ,  Family history is unknown by patient  ,   reports that she has been smoking cigarettes  She has been smoking about 0 25 packs per day  She has never used smokeless tobacco  She reports that she has current or past drug history  Drug: Methamphetamines  She reports that she does not drink alcohol ,  is allergic to ciprofloxacin and wound dressing adhesive     Current Outpatient Medications   Medication Sig Dispense Refill    OLANZapine (ZyPREXA) 10 mg tablet Take 1 tablet (10 mg total) by mouth daily at bedtime 90 tablet 0    omeprazole (PriLOSEC) 40 MG capsule Take 1 capsule (40 mg total) by mouth daily before breakfast 90 capsule 0    PARoxetine (PAXIL) 40 MG tablet Take 1 tablet (40 mg total) by mouth every morning 90 tablet 0    QUEtiapine (SEROquel) 50 mg tablet Take 1 tablet (50 mg total) by mouth daily at bedtime 90 tablet 1    topiramate (TOPAMAX) 100 mg tablet Take 1 tablet (100 mg total) by mouth 2 (two) times a day 180 tablet 0    valACYclovir (VALTREX) 1,000 mg tablet Take 1 tablet (1,000 mg total) by mouth daily 90 tablet 1    valsartan (DIOVAN) 160 mg tablet Take 1 tablet (160 mg total) by mouth daily 30 tablet 1     No current facility-administered medications for this visit          Review of Systems Constitutional: Negative for activity change, appetite change, fatigue and fever  HENT: Negative for congestion, ear pain, postnasal drip, rhinorrhea, sinus pressure, sinus pain, sneezing and sore throat  Eyes: Negative for pain and redness  Respiratory: Negative for apnea, cough, chest tightness, shortness of breath and wheezing  Cardiovascular: Negative for chest pain, palpitations and leg swelling  Gastrointestinal: Negative for abdominal pain, constipation, diarrhea, nausea and vomiting  Endocrine: Negative for cold intolerance and heat intolerance  Genitourinary: Negative for difficulty urinating, dysuria, frequency, hematuria and urgency  Musculoskeletal: Positive for back pain and myalgias  Negative for gait problem  Skin: Negative for rash  Neurological: Negative for dizziness, speech difficulty, weakness, numbness and headaches  Hematological: Does not bruise/bleed easily  Psychiatric/Behavioral: Negative for agitation, confusion and hallucinations  Objective:  Vitals:    07/24/20 1518   BP: (!) 180/90   BP Location: Left arm   Patient Position: Sitting   Cuff Size: Standard   Pulse: 80   Resp: 14   Temp: 99 2 °F (37 3 °C)   TempSrc: Tympanic   SpO2: 97%   Weight: 69 4 kg (153 lb)   Height: 5' 4" (1 626 m)     Body mass index is 26 26 kg/m²  Physical Exam   Constitutional: She is oriented to person, place, and time  She appears well-developed and well-nourished  She appears distressed  HENT:   Head: Normocephalic and atraumatic  Right Ear: External ear normal    Left Ear: External ear normal    Nose: Nose normal    Mouth/Throat: Oropharynx is clear and moist    Eyes: Pupils are equal, round, and reactive to light  Conjunctivae and EOM are normal  No scleral icterus  Neck: Normal range of motion  Neck supple  No thyromegaly present  Cardiovascular: Normal rate, regular rhythm, normal heart sounds and intact distal pulses     Pulmonary/Chest: Effort normal and breath sounds normal  She has no wheezes  Abdominal: Soft  Bowel sounds are normal  She exhibits no distension  There is no tenderness  There is no rebound and no guarding  Musculoskeletal: Normal range of motion  She exhibits no edema, tenderness or deformity  Neurological: She is alert and oriented to person, place, and time  Skin: Skin is warm and dry  No rash noted  No erythema  Psychiatric: She has a normal mood and affect  Her behavior is normal  Judgment and thought content normal    Nursing note and vitals reviewed

## 2020-07-26 DIAGNOSIS — N39.0 URINARY TRACT INFECTION WITHOUT HEMATURIA, SITE UNSPECIFIED: Primary | ICD-10-CM

## 2020-07-26 RX ORDER — SULFAMETHOXAZOLE AND TRIMETHOPRIM 800; 160 MG/1; MG/1
1 TABLET ORAL EVERY 12 HOURS SCHEDULED
Qty: 14 TABLET | Refills: 0 | Status: SHIPPED | OUTPATIENT
Start: 2020-07-26 | End: 2020-08-02

## 2020-07-29 ENCOUNTER — TELEPHONE (OUTPATIENT)
Dept: FAMILY MEDICINE CLINIC | Facility: CLINIC | Age: 45
End: 2020-07-29

## 2020-07-29 NOTE — TELEPHONE ENCOUNTER
Spoke with patient and she was looking to see if you had the letter done for her so she can apply for social security due to her mental health problems  She does have a follow up with you scheduled in two weeks  Pt would like a call back when ready

## 2020-07-31 LAB
ALBUMIN SERPL-MCNC: 4.5 G/DL (ref 3.8–4.8)
ALBUMIN/GLOB SERPL: 1.7 {RATIO} (ref 1.2–2.2)
ALP SERPL-CCNC: 83 IU/L (ref 39–117)
ALT SERPL-CCNC: 33 IU/L (ref 0–32)
APPEARANCE UR: CLEAR
AST SERPL-CCNC: 24 IU/L (ref 0–40)
BACTERIA UR CULT: NORMAL
BACTERIA URNS QL MICRO: ABNORMAL
BASOPHILS # BLD AUTO: 0.1 X10E3/UL (ref 0–0.2)
BASOPHILS NFR BLD AUTO: 1 %
BILIRUB SERPL-MCNC: 0.2 MG/DL (ref 0–1.2)
BILIRUB UR QL STRIP: NEGATIVE
BUN SERPL-MCNC: 22 MG/DL (ref 6–24)
BUN/CREAT SERPL: 35 (ref 9–23)
CALCIUM SERPL-MCNC: 10 MG/DL (ref 8.7–10.2)
CHLORIDE SERPL-SCNC: 103 MMOL/L (ref 96–106)
CHOLEST SERPL-MCNC: 200 MG/DL (ref 100–199)
CO2 SERPL-SCNC: 22 MMOL/L (ref 20–29)
COLOR UR: YELLOW
CREAT SERPL-MCNC: 0.63 MG/DL (ref 0.57–1)
EOSINOPHIL # BLD AUTO: 0.2 X10E3/UL (ref 0–0.4)
EOSINOPHIL NFR BLD AUTO: 3 %
EPI CELLS #/AREA URNS HPF: ABNORMAL /HPF (ref 0–10)
ERYTHROCYTE [DISTWIDTH] IN BLOOD BY AUTOMATED COUNT: 13.3 % (ref 11.7–15.4)
GLOBULIN SER-MCNC: 2.7 G/DL (ref 1.5–4.5)
GLUCOSE SERPL-MCNC: 92 MG/DL (ref 65–99)
GLUCOSE UR QL: NEGATIVE
HCT VFR BLD AUTO: 41.8 % (ref 34–46.6)
HDLC SERPL-MCNC: 60 MG/DL
HGB BLD-MCNC: 14.3 G/DL (ref 11.1–15.9)
HGB UR QL STRIP: NEGATIVE
IMM GRANULOCYTES # BLD: 0.1 X10E3/UL (ref 0–0.1)
IMM GRANULOCYTES NFR BLD: 1 %
KETONES UR QL STRIP: NEGATIVE
LDLC SERPL CALC-MCNC: 97 MG/DL (ref 0–99)
LDLC SERPL DIRECT ASSAY-MCNC: 110 MG/DL (ref 0–99)
LEUKOCYTE ESTERASE UR QL STRIP: ABNORMAL
LYMPHOCYTES # BLD AUTO: 2.6 X10E3/UL (ref 0.7–3.1)
LYMPHOCYTES NFR BLD AUTO: 37 %
Lab: NORMAL
MAGNESIUM SERPL-MCNC: 1.8 MG/DL (ref 1.6–2.3)
MCH RBC QN AUTO: 30.2 PG (ref 26.6–33)
MCHC RBC AUTO-ENTMCNC: 34.2 G/DL (ref 31.5–35.7)
MCV RBC AUTO: 88 FL (ref 79–97)
MICRO URNS: ABNORMAL
MONOCYTES # BLD AUTO: 0.9 X10E3/UL (ref 0.1–0.9)
MONOCYTES NFR BLD AUTO: 13 %
MUCOUS THREADS URNS QL MICRO: PRESENT
NEUTROPHILS # BLD AUTO: 3.2 X10E3/UL (ref 1.4–7)
NEUTROPHILS NFR BLD AUTO: 45 %
NITRITE UR QL STRIP: NEGATIVE
PH UR STRIP: 5 [PH] (ref 5–7.5)
PLATELET # BLD AUTO: 250 X10E3/UL (ref 150–450)
POTASSIUM SERPL-SCNC: 4.5 MMOL/L (ref 3.5–5.2)
PROT SERPL-MCNC: 7.2 G/DL (ref 6–8.5)
PROT UR QL STRIP: NEGATIVE
RBC # BLD AUTO: 4.73 X10E6/UL (ref 3.77–5.28)
RBC #/AREA URNS HPF: ABNORMAL /HPF (ref 0–2)
SL AMB EGFR AFRICAN AMERICAN: 124 ML/MIN/1.73
SL AMB EGFR NON AFRICAN AMERICAN: 107 ML/MIN/1.73
SL AMB URINALYSIS REFLEX: ABNORMAL
SL AMB VLDL CHOLESTEROL CALC: 43 MG/DL (ref 5–40)
SODIUM SERPL-SCNC: 141 MMOL/L (ref 134–144)
SP GR UR: 1.02 (ref 1–1.03)
T4 SERPL-MCNC: 6.8 UG/DL (ref 4.5–12)
TRIGL SERPL-MCNC: 213 MG/DL (ref 0–149)
TSH SERPL DL<=0.005 MIU/L-ACNC: 2.45 UIU/ML (ref 0.45–4.5)
URATE SERPL-MCNC: 5.2 MG/DL (ref 2.5–7.1)
UROBILINOGEN UR STRIP-ACNC: 0.2 MG/DL (ref 0.2–1)
WBC # BLD AUTO: 7 X10E3/UL (ref 3.4–10.8)
WBC #/AREA URNS HPF: >30 /HPF (ref 0–5)

## 2020-09-02 ENCOUNTER — OFFICE VISIT (OUTPATIENT)
Dept: FAMILY MEDICINE CLINIC | Facility: CLINIC | Age: 45
End: 2020-09-02
Payer: COMMERCIAL

## 2020-09-02 VITALS
WEIGHT: 157 LBS | HEART RATE: 72 BPM | BODY MASS INDEX: 26.8 KG/M2 | RESPIRATION RATE: 14 BRPM | SYSTOLIC BLOOD PRESSURE: 182 MMHG | OXYGEN SATURATION: 97 % | DIASTOLIC BLOOD PRESSURE: 92 MMHG | TEMPERATURE: 98.6 F | HEIGHT: 64 IN

## 2020-09-02 DIAGNOSIS — K21.9 GASTROESOPHAGEAL REFLUX DISEASE WITHOUT ESOPHAGITIS: ICD-10-CM

## 2020-09-02 DIAGNOSIS — F20.9 SCHIZOPHRENIA, UNSPECIFIED TYPE (HCC): ICD-10-CM

## 2020-09-02 DIAGNOSIS — I10 ESSENTIAL HYPERTENSION: Primary | ICD-10-CM

## 2020-09-02 DIAGNOSIS — Z72.0 TOBACCO ABUSE: ICD-10-CM

## 2020-09-02 PROCEDURE — 3080F DIAST BP >= 90 MM HG: CPT | Performed by: FAMILY MEDICINE

## 2020-09-02 PROCEDURE — 3077F SYST BP >= 140 MM HG: CPT | Performed by: FAMILY MEDICINE

## 2020-09-02 PROCEDURE — 99214 OFFICE O/P EST MOD 30 MIN: CPT | Performed by: FAMILY MEDICINE

## 2020-09-02 NOTE — PROGRESS NOTES
Assessment/Plan:      There are no diagnoses linked to this encounter  Subjective:     Patient ID: Amara Herman is a 50 y o  female  Pt here for checkup  On her schizophrenia, HTN and GERD  Pt is looking for a psychiatrist  And  Also had labs  Done and reviewed  Pt is still smoking and needs to quit  Pt will double up o diovn for 320 by next visit  Consider exforge if not  Ontrolled  Review of Systems   Constitutional: Negative for activity change, appetite change, fatigue and fever  HENT: Negative for congestion, ear pain, postnasal drip, rhinorrhea, sinus pressure, sinus pain, sneezing and sore throat  Eyes: Negative for pain and redness  Respiratory: Negative for apnea, cough, chest tightness, shortness of breath and wheezing  Cardiovascular: Negative for chest pain, palpitations and leg swelling  Gastrointestinal: Negative for abdominal pain, constipation, diarrhea, nausea and vomiting  Endocrine: Negative for cold intolerance and heat intolerance  Genitourinary: Negative for difficulty urinating, dysuria, frequency, hematuria and urgency  Musculoskeletal: Negative for arthralgias, back pain, gait problem and myalgias  Skin: Negative for rash  Neurological: Negative for dizziness, speech difficulty, weakness, numbness and headaches  Hematological: Does not bruise/bleed easily  Psychiatric/Behavioral: Negative for agitation, confusion and hallucinations  Objective:     Physical Exam  Vitals signs and nursing note reviewed  Constitutional:       Appearance: She is well-developed  HENT:      Head: Normocephalic and atraumatic  Nose: Nose normal    Eyes:      General: No scleral icterus  Conjunctiva/sclera: Conjunctivae normal       Pupils: Pupils are equal, round, and reactive to light  Neck:      Musculoskeletal: Normal range of motion and neck supple  Thyroid: No thyromegaly     Cardiovascular:      Rate and Rhythm: Normal rate and regular rhythm  Pulmonary:      Effort: Pulmonary effort is normal       Breath sounds: Normal breath sounds  No wheezing  Abdominal:      General: Bowel sounds are normal  There is no distension  Palpations: Abdomen is soft  Tenderness: There is no abdominal tenderness  There is no guarding or rebound  Musculoskeletal: Normal range of motion  General: No tenderness or deformity  Skin:     General: Skin is warm and dry  Findings: No erythema or rash  Neurological:      Mental Status: She is alert and oriented to person, place, and time  Sensory: No sensory deficit  Psychiatric:         Behavior: Behavior normal          Thought Content:  Thought content normal          Judgment: Judgment normal

## 2020-09-09 ENCOUNTER — TELEPHONE (OUTPATIENT)
Dept: FAMILY MEDICINE CLINIC | Facility: CLINIC | Age: 45
End: 2020-09-09

## 2020-09-09 DIAGNOSIS — R60.9 EDEMA, UNSPECIFIED TYPE: Primary | ICD-10-CM

## 2020-09-09 DIAGNOSIS — R60.9 EDEMA, UNSPECIFIED TYPE: ICD-10-CM

## 2020-09-09 RX ORDER — FUROSEMIDE 40 MG/1
40 TABLET ORAL DAILY PRN
Qty: 30 TABLET | Refills: 0 | Status: SHIPPED | OUTPATIENT
Start: 2020-09-09 | End: 2020-09-09 | Stop reason: SDUPTHER

## 2020-09-09 RX ORDER — FUROSEMIDE 40 MG/1
40 TABLET ORAL DAILY PRN
Qty: 30 TABLET | Refills: 0 | Status: SHIPPED | OUTPATIENT
Start: 2020-09-09 | End: 2020-10-05

## 2020-09-09 NOTE — TELEPHONE ENCOUNTER
Pt was recently seen for an office visit and was supposed to have Lasix sent to the pharmacy and was never sent  Please send medication to the [facility] [address] in [place]

## 2020-09-21 DIAGNOSIS — I10 ESSENTIAL HYPERTENSION: ICD-10-CM

## 2020-09-21 RX ORDER — VALSARTAN 160 MG/1
TABLET ORAL
Qty: 30 TABLET | Refills: 1 | Status: SHIPPED | OUTPATIENT
Start: 2020-09-21 | End: 2020-10-13

## 2020-10-05 DIAGNOSIS — R60.9 EDEMA, UNSPECIFIED TYPE: ICD-10-CM

## 2020-10-05 RX ORDER — FUROSEMIDE 40 MG/1
40 TABLET ORAL DAILY PRN
Qty: 30 TABLET | Refills: 0 | Status: SHIPPED | OUTPATIENT
Start: 2020-10-05 | End: 2020-11-11 | Stop reason: SDUPTHER

## 2020-10-12 ENCOUNTER — TELEPHONE (OUTPATIENT)
Dept: FAMILY MEDICINE CLINIC | Facility: CLINIC | Age: 45
End: 2020-10-12

## 2020-10-12 DIAGNOSIS — I10 ESSENTIAL HYPERTENSION: ICD-10-CM

## 2020-10-13 DIAGNOSIS — I10 ESSENTIAL HYPERTENSION: Primary | ICD-10-CM

## 2020-10-13 RX ORDER — VALSARTAN 320 MG/1
320 TABLET ORAL DAILY
Qty: 30 TABLET | Refills: 1 | Status: SHIPPED | OUTPATIENT
Start: 2020-10-13 | End: 2020-11-11 | Stop reason: SDUPTHER

## 2020-10-28 ENCOUNTER — HOSPITAL ENCOUNTER (EMERGENCY)
Facility: HOSPITAL | Age: 45
Discharge: HOME | End: 2020-10-28
Attending: EMERGENCY MEDICINE | Admitting: EMERGENCY MEDICINE
Payer: COMMERCIAL

## 2020-10-28 ENCOUNTER — APPOINTMENT (EMERGENCY)
Dept: CT IMAGING | Facility: HOSPITAL | Age: 45
End: 2020-10-28
Payer: COMMERCIAL

## 2020-10-28 VITALS
RESPIRATION RATE: 18 BRPM | TEMPERATURE: 99.2 F | DIASTOLIC BLOOD PRESSURE: 90 MMHG | OXYGEN SATURATION: 96 % | SYSTOLIC BLOOD PRESSURE: 179 MMHG | BODY MASS INDEX: 29.21 KG/M2 | WEIGHT: 171.08 LBS | HEIGHT: 64 IN | HEART RATE: 91 BPM

## 2020-10-28 DIAGNOSIS — R10.13 EPIGASTRIC ABDOMINAL PAIN: Primary | ICD-10-CM

## 2020-10-28 DIAGNOSIS — R11.0 NAUSEA: ICD-10-CM

## 2020-10-28 DIAGNOSIS — S09.90XA CLOSED HEAD INJURY, INITIAL ENCOUNTER: ICD-10-CM

## 2020-10-28 LAB
ALBUMIN SERPL BCP-MCNC: 4 G/DL (ref 3.5–5)
ALP SERPL-CCNC: 117 U/L (ref 46–116)
ALT SERPL W P-5'-P-CCNC: 81 U/L (ref 12–78)
ANION GAP SERPL CALCULATED.3IONS-SCNC: 9 MMOL/L (ref 4–13)
AST SERPL W P-5'-P-CCNC: 43 U/L (ref 5–45)
BASOPHILS # BLD AUTO: 0.06 THOUSANDS/ΜL (ref 0–0.1)
BASOPHILS NFR BLD AUTO: 1 % (ref 0–1)
BILIRUB SERPL-MCNC: 0.2 MG/DL (ref 0.2–1)
BUN SERPL-MCNC: 15 MG/DL (ref 5–25)
CALCIUM SERPL-MCNC: 9 MG/DL (ref 8.3–10.1)
CHLORIDE SERPL-SCNC: 105 MMOL/L (ref 100–108)
CO2 SERPL-SCNC: 25 MMOL/L (ref 21–32)
CREAT SERPL-MCNC: 0.85 MG/DL (ref 0.6–1.3)
EOSINOPHIL # BLD AUTO: 0.19 THOUSAND/ΜL (ref 0–0.61)
EOSINOPHIL NFR BLD AUTO: 2 % (ref 0–6)
ERYTHROCYTE [DISTWIDTH] IN BLOOD BY AUTOMATED COUNT: 12.6 % (ref 11.6–15.1)
GFR SERPL CREATININE-BSD FRML MDRD: 82 ML/MIN/1.73SQ M
GLUCOSE SERPL-MCNC: 111 MG/DL (ref 65–140)
HCT VFR BLD AUTO: 41.3 % (ref 34.8–46.1)
HGB BLD-MCNC: 13.9 G/DL (ref 11.5–15.4)
IMM GRANULOCYTES # BLD AUTO: 0.05 THOUSAND/UL (ref 0–0.2)
IMM GRANULOCYTES NFR BLD AUTO: 1 % (ref 0–2)
LIPASE SERPL-CCNC: 92 U/L (ref 73–393)
LYMPHOCYTES # BLD AUTO: 1.77 THOUSANDS/ΜL (ref 0.6–4.47)
LYMPHOCYTES NFR BLD AUTO: 16 % (ref 14–44)
MCH RBC QN AUTO: 29.6 PG (ref 26.8–34.3)
MCHC RBC AUTO-ENTMCNC: 33.7 G/DL (ref 31.4–37.4)
MCV RBC AUTO: 88 FL (ref 82–98)
MONOCYTES # BLD AUTO: 1.02 THOUSAND/ΜL (ref 0.17–1.22)
MONOCYTES NFR BLD AUTO: 9 % (ref 4–12)
NEUTROPHILS # BLD AUTO: 7.99 THOUSANDS/ΜL (ref 1.85–7.62)
NEUTS SEG NFR BLD AUTO: 71 % (ref 43–75)
NRBC BLD AUTO-RTO: 0 /100 WBCS
PLATELET # BLD AUTO: 208 THOUSANDS/UL (ref 149–390)
PMV BLD AUTO: 9.5 FL (ref 8.9–12.7)
POTASSIUM SERPL-SCNC: 3.9 MMOL/L (ref 3.5–5.3)
PROT SERPL-MCNC: 7.8 G/DL (ref 6.4–8.2)
RBC # BLD AUTO: 4.7 MILLION/UL (ref 3.81–5.12)
SODIUM SERPL-SCNC: 139 MMOL/L (ref 136–145)
WBC # BLD AUTO: 11.08 THOUSAND/UL (ref 4.31–10.16)

## 2020-10-28 PROCEDURE — G1004 CDSM NDSC: HCPCS

## 2020-10-28 PROCEDURE — 36415 COLL VENOUS BLD VENIPUNCTURE: CPT | Performed by: PHYSICIAN ASSISTANT

## 2020-10-28 PROCEDURE — 80053 COMPREHEN METABOLIC PANEL: CPT | Performed by: PHYSICIAN ASSISTANT

## 2020-10-28 PROCEDURE — 99284 EMERGENCY DEPT VISIT MOD MDM: CPT

## 2020-10-28 PROCEDURE — 74177 CT ABD & PELVIS W/CONTRAST: CPT

## 2020-10-28 PROCEDURE — 83690 ASSAY OF LIPASE: CPT | Performed by: PHYSICIAN ASSISTANT

## 2020-10-28 PROCEDURE — 99284 EMERGENCY DEPT VISIT MOD MDM: CPT | Performed by: PHYSICIAN ASSISTANT

## 2020-10-28 PROCEDURE — 85025 COMPLETE CBC W/AUTO DIFF WBC: CPT | Performed by: PHYSICIAN ASSISTANT

## 2020-10-28 PROCEDURE — 96375 TX/PRO/DX INJ NEW DRUG ADDON: CPT

## 2020-10-28 PROCEDURE — 96374 THER/PROPH/DIAG INJ IV PUSH: CPT

## 2020-10-28 PROCEDURE — 70450 CT HEAD/BRAIN W/O DYE: CPT

## 2020-10-28 RX ORDER — ONDANSETRON 4 MG/1
4 TABLET, ORALLY DISINTEGRATING ORAL EVERY 6 HOURS PRN
Qty: 20 TABLET | Refills: 0 | Status: SHIPPED | OUTPATIENT
Start: 2020-10-28 | End: 2021-12-06

## 2020-10-28 RX ORDER — ONDANSETRON 2 MG/ML
4 INJECTION INTRAMUSCULAR; INTRAVENOUS ONCE
Status: COMPLETED | OUTPATIENT
Start: 2020-10-28 | End: 2020-10-28

## 2020-10-28 RX ORDER — KETOROLAC TROMETHAMINE 30 MG/ML
30 INJECTION, SOLUTION INTRAMUSCULAR; INTRAVENOUS ONCE
Status: COMPLETED | OUTPATIENT
Start: 2020-10-28 | End: 2020-10-28

## 2020-10-28 RX ORDER — ACETAMINOPHEN 325 MG/1
975 TABLET ORAL ONCE
Status: COMPLETED | OUTPATIENT
Start: 2020-10-28 | End: 2020-10-28

## 2020-10-28 RX ADMIN — ONDANSETRON 4 MG: 2 INJECTION INTRAMUSCULAR; INTRAVENOUS at 21:38

## 2020-10-28 RX ADMIN — KETOROLAC TROMETHAMINE 30 MG: 30 INJECTION, SOLUTION INTRAMUSCULAR at 23:47

## 2020-10-28 RX ADMIN — ACETAMINOPHEN 975 MG: 325 TABLET, FILM COATED ORAL at 21:38

## 2020-10-28 RX ADMIN — IOHEXOL 100 ML: 350 INJECTION, SOLUTION INTRAVENOUS at 22:18

## 2020-11-07 DIAGNOSIS — F33.41 RECURRENT MAJOR DEPRESSIVE DISORDER, IN PARTIAL REMISSION (HCC): ICD-10-CM

## 2020-11-08 RX ORDER — PAROXETINE HYDROCHLORIDE 40 MG/1
TABLET, FILM COATED ORAL
Qty: 90 TABLET | Refills: 0 | Status: SHIPPED | OUTPATIENT
Start: 2020-11-08 | End: 2020-11-11 | Stop reason: SDUPTHER

## 2020-11-11 ENCOUNTER — OFFICE VISIT (OUTPATIENT)
Dept: FAMILY MEDICINE CLINIC | Facility: CLINIC | Age: 45
End: 2020-11-11
Payer: COMMERCIAL

## 2020-11-11 VITALS
OXYGEN SATURATION: 98 % | WEIGHT: 173 LBS | SYSTOLIC BLOOD PRESSURE: 142 MMHG | RESPIRATION RATE: 16 BRPM | TEMPERATURE: 98.2 F | HEIGHT: 64 IN | BODY MASS INDEX: 29.53 KG/M2 | HEART RATE: 80 BPM | DIASTOLIC BLOOD PRESSURE: 86 MMHG

## 2020-11-11 DIAGNOSIS — F33.41 RECURRENT MAJOR DEPRESSIVE DISORDER, IN PARTIAL REMISSION (HCC): ICD-10-CM

## 2020-11-11 DIAGNOSIS — E78.2 MIXED HYPERLIPIDEMIA: ICD-10-CM

## 2020-11-11 DIAGNOSIS — K21.9 GASTROESOPHAGEAL REFLUX DISEASE WITHOUT ESOPHAGITIS: ICD-10-CM

## 2020-11-11 DIAGNOSIS — R60.9 EDEMA, UNSPECIFIED TYPE: ICD-10-CM

## 2020-11-11 DIAGNOSIS — I10 ESSENTIAL HYPERTENSION: ICD-10-CM

## 2020-11-11 DIAGNOSIS — F31.9 BIPOLAR 1 DISORDER (HCC): Primary | ICD-10-CM

## 2020-11-11 DIAGNOSIS — F20.9 SCHIZOPHRENIA, UNSPECIFIED TYPE (HCC): ICD-10-CM

## 2020-11-11 PROCEDURE — 3079F DIAST BP 80-89 MM HG: CPT | Performed by: FAMILY MEDICINE

## 2020-11-11 PROCEDURE — 99213 OFFICE O/P EST LOW 20 MIN: CPT | Performed by: FAMILY MEDICINE

## 2020-11-11 PROCEDURE — 3008F BODY MASS INDEX DOCD: CPT | Performed by: FAMILY MEDICINE

## 2020-11-11 PROCEDURE — 3077F SYST BP >= 140 MM HG: CPT | Performed by: FAMILY MEDICINE

## 2020-11-11 RX ORDER — QUETIAPINE FUMARATE 300 MG/1
300 TABLET, FILM COATED ORAL
Qty: 30 TABLET | Refills: 1 | Status: SHIPPED | OUTPATIENT
Start: 2020-11-11 | End: 2021-02-12 | Stop reason: SDUPTHER

## 2020-11-11 RX ORDER — OLANZAPINE 10 MG/1
10 TABLET ORAL
Qty: 90 TABLET | Refills: 0 | Status: SHIPPED | OUTPATIENT
Start: 2020-11-11 | End: 2021-02-12 | Stop reason: SDUPTHER

## 2020-11-11 RX ORDER — FUROSEMIDE 40 MG/1
40 TABLET ORAL DAILY PRN
Qty: 30 TABLET | Refills: 0 | Status: SHIPPED | OUTPATIENT
Start: 2020-11-11 | End: 2021-10-28 | Stop reason: HOSPADM

## 2020-11-11 RX ORDER — PAROXETINE HYDROCHLORIDE 40 MG/1
40 TABLET, FILM COATED ORAL EVERY MORNING
Qty: 90 TABLET | Refills: 0 | Status: SHIPPED | OUTPATIENT
Start: 2020-11-11 | End: 2021-02-12 | Stop reason: SDUPTHER

## 2020-11-11 RX ORDER — OMEPRAZOLE 40 MG/1
40 CAPSULE, DELAYED RELEASE ORAL
Qty: 90 CAPSULE | Refills: 0 | Status: SHIPPED | OUTPATIENT
Start: 2020-11-11 | End: 2021-04-23 | Stop reason: SDUPTHER

## 2020-11-11 RX ORDER — TOPIRAMATE 100 MG/1
100 TABLET, FILM COATED ORAL 2 TIMES DAILY
Qty: 180 TABLET | Refills: 0 | Status: SHIPPED | OUTPATIENT
Start: 2020-11-11 | End: 2021-02-12 | Stop reason: SDUPTHER

## 2020-11-11 RX ORDER — VALSARTAN 320 MG/1
320 TABLET ORAL DAILY
Qty: 30 TABLET | Refills: 1 | Status: SHIPPED | OUTPATIENT
Start: 2020-11-11 | End: 2021-04-23 | Stop reason: SDUPTHER

## 2021-02-12 DIAGNOSIS — F33.41 RECURRENT MAJOR DEPRESSIVE DISORDER, IN PARTIAL REMISSION (HCC): ICD-10-CM

## 2021-02-12 DIAGNOSIS — F31.9 BIPOLAR 1 DISORDER (HCC): ICD-10-CM

## 2021-02-12 RX ORDER — QUETIAPINE FUMARATE 300 MG/1
300 TABLET, FILM COATED ORAL
Qty: 10 TABLET | Refills: 0 | Status: SHIPPED | OUTPATIENT
Start: 2021-02-12

## 2021-02-12 RX ORDER — PAROXETINE HYDROCHLORIDE 40 MG/1
40 TABLET, FILM COATED ORAL EVERY MORNING
Qty: 10 TABLET | Refills: 0 | Status: SHIPPED | OUTPATIENT
Start: 2021-02-12

## 2021-02-12 RX ORDER — OLANZAPINE 10 MG/1
10 TABLET ORAL
Qty: 10 TABLET | Refills: 0 | Status: SHIPPED | OUTPATIENT
Start: 2021-02-12 | End: 2021-04-23

## 2021-02-12 RX ORDER — TOPIRAMATE 100 MG/1
100 TABLET, FILM COATED ORAL 2 TIMES DAILY
Qty: 20 TABLET | Refills: 0 | Status: SHIPPED | OUTPATIENT
Start: 2021-02-12 | End: 2021-12-06

## 2021-02-12 NOTE — TELEPHONE ENCOUNTER
Patient called asking for one week of her medications until she sees the psychiatrist  Zyprexa, paxil, seroquel, topamax,

## 2021-02-17 ENCOUNTER — TELEPHONE (OUTPATIENT)
Dept: GASTROENTEROLOGY | Facility: CLINIC | Age: 46
End: 2021-02-17

## 2021-02-17 NOTE — TELEPHONE ENCOUNTER
ptn called to make an earlier appt with Dr Abbi Alves  She reported severe ABD pain and said her stomach is hard  I advised she go to the ER  She stated she has no way there and I suggested she take an ambulance   pth also states she was in the ER in Jan 2021 but per her chart she hasnt been there since 10/2020

## 2021-04-23 ENCOUNTER — OFFICE VISIT (OUTPATIENT)
Dept: FAMILY MEDICINE CLINIC | Facility: CLINIC | Age: 46
End: 2021-04-23
Payer: COMMERCIAL

## 2021-04-23 VITALS
TEMPERATURE: 100.9 F | WEIGHT: 161 LBS | RESPIRATION RATE: 14 BRPM | OXYGEN SATURATION: 97 % | BODY MASS INDEX: 27.49 KG/M2 | SYSTOLIC BLOOD PRESSURE: 196 MMHG | HEIGHT: 64 IN | HEART RATE: 89 BPM | DIASTOLIC BLOOD PRESSURE: 98 MMHG

## 2021-04-23 DIAGNOSIS — F41.9 ANXIETY: ICD-10-CM

## 2021-04-23 DIAGNOSIS — M54.2 NECK PAIN: ICD-10-CM

## 2021-04-23 DIAGNOSIS — F31.9 BIPOLAR 1 DISORDER (HCC): ICD-10-CM

## 2021-04-23 DIAGNOSIS — Z11.4 SCREENING FOR HIV (HUMAN IMMUNODEFICIENCY VIRUS): ICD-10-CM

## 2021-04-23 DIAGNOSIS — Z12.31 SCREENING MAMMOGRAM FOR HIGH-RISK PATIENT: ICD-10-CM

## 2021-04-23 DIAGNOSIS — M54.50 CHRONIC LOW BACK PAIN WITHOUT SCIATICA, UNSPECIFIED BACK PAIN LATERALITY: ICD-10-CM

## 2021-04-23 DIAGNOSIS — I10 ESSENTIAL HYPERTENSION: Primary | ICD-10-CM

## 2021-04-23 DIAGNOSIS — Z12.4 SCREENING FOR CERVICAL CANCER: ICD-10-CM

## 2021-04-23 DIAGNOSIS — K21.9 GASTROESOPHAGEAL REFLUX DISEASE WITHOUT ESOPHAGITIS: ICD-10-CM

## 2021-04-23 DIAGNOSIS — Z72.0 TOBACCO ABUSE: ICD-10-CM

## 2021-04-23 DIAGNOSIS — G89.29 CHRONIC LOW BACK PAIN WITHOUT SCIATICA, UNSPECIFIED BACK PAIN LATERALITY: ICD-10-CM

## 2021-04-23 PROCEDURE — 99214 OFFICE O/P EST MOD 30 MIN: CPT | Performed by: FAMILY MEDICINE

## 2021-04-23 PROCEDURE — 3008F BODY MASS INDEX DOCD: CPT | Performed by: FAMILY MEDICINE

## 2021-04-23 PROCEDURE — 3080F DIAST BP >= 90 MM HG: CPT | Performed by: FAMILY MEDICINE

## 2021-04-23 PROCEDURE — 3077F SYST BP >= 140 MM HG: CPT | Performed by: FAMILY MEDICINE

## 2021-04-23 RX ORDER — QUETIAPINE FUMARATE 50 MG/1
50 TABLET, FILM COATED ORAL
COMMUNITY
Start: 2021-03-28 | End: 2021-04-23 | Stop reason: SDUPTHER

## 2021-04-23 RX ORDER — IBUPROFEN 400 MG/1
400 TABLET ORAL EVERY 6 HOURS PRN
Qty: 80 TABLET | Refills: 2 | Status: SHIPPED | OUTPATIENT
Start: 2021-04-23 | End: 2021-10-01 | Stop reason: SDUPTHER

## 2021-04-23 RX ORDER — QUETIAPINE FUMARATE 50 MG/1
50 TABLET, FILM COATED ORAL
Qty: 30 TABLET | Refills: 1 | Status: SHIPPED | OUTPATIENT
Start: 2021-04-23 | End: 2021-10-28 | Stop reason: HOSPADM

## 2021-04-23 RX ORDER — VALSARTAN 320 MG/1
320 TABLET ORAL DAILY
Qty: 30 TABLET | Refills: 1 | Status: SHIPPED | OUTPATIENT
Start: 2021-04-23 | End: 2021-06-14

## 2021-04-23 RX ORDER — CYCLOBENZAPRINE HCL 10 MG
10 TABLET ORAL 3 TIMES DAILY PRN
Qty: 30 TABLET | Refills: 1 | Status: SHIPPED | OUTPATIENT
Start: 2021-04-23 | End: 2021-12-06

## 2021-04-23 RX ORDER — OMEPRAZOLE 40 MG/1
40 CAPSULE, DELAYED RELEASE ORAL
Qty: 90 CAPSULE | Refills: 0 | Status: SHIPPED | OUTPATIENT
Start: 2021-04-23 | End: 2021-12-06

## 2021-04-23 NOTE — ASSESSMENT & PLAN NOTE
Patient encouraged to quit using tobacco for that increases their risk for COPD, lung cancer,stroke, oral cancer and heart disease  If patient does not want to quit they should let me know  when they are interested in quitting  There are numerous options to use to quit and we can discuss them
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
## 2021-04-24 ENCOUNTER — NURSE TRIAGE (OUTPATIENT)
Dept: OTHER | Facility: OTHER | Age: 46
End: 2021-04-24

## 2021-04-24 DIAGNOSIS — F41.9 ANXIETY: Primary | ICD-10-CM

## 2021-04-24 RX ORDER — BUSPIRONE HYDROCHLORIDE 10 MG/1
10 TABLET ORAL 2 TIMES DAILY PRN
Qty: 60 TABLET | Refills: 0 | Status: SHIPPED | OUTPATIENT
Start: 2021-04-24 | End: 2021-05-17

## 2021-04-24 NOTE — TELEPHONE ENCOUNTER
Reason for Disposition   [1] Prescription not at pharmacy AND [2] was prescribed by PCP recently    Answer Assessment - Initial Assessment Questions  1  NAME of MEDICATION: "What medicine are you calling about?"      Buspar    2  QUESTION: "What is your question?"      Patient states Dr Kayleigh Davalos was supposed to prescribe this medication for her during an appointment yesterday but did not send the medication order to the pharmacy    3  PRESCRIBING HCP: "Who prescribed it?" Reason: if prescribed by specialist, call should be referred to that group  Dr Kayleigh Davalos    4  SYMPTOMS: "Do you have any symptoms?"      Anxiety    5   SEVERITY: If symptoms are present, ask "Are they mild, moderate or severe?"      Moderate    Protocols used: MEDICATION QUESTION CALL-ADULT-

## 2021-04-24 NOTE — TELEPHONE ENCOUNTER
Sent TC message to on call physician with patient's concerns:    Per Dr Elfego Love:  "I just read his note  He mentioned buspar tid  It is not a schedule drug so we can give it to her but I usually start your buspar 10 mg bid and then titrate dose   We can prescribe #60 she can follow up with Dr Consuelo Forrest next week "    Called patient back and informed of above, I let her know I would send Buspar to [facility] on [address] in [facility] as she indicated, and that she should follow up with Dr Alexander Ramirez office next week to determine if any adjustments need to be made  Patient verbalized understanding

## 2021-04-24 NOTE — TELEPHONE ENCOUNTER
Regarding: Busbar Medication  ----- Message from [facility] sent at 4/24/2021  1:05 PM EDT -----  " I went to my Doctor and he didn't call in my Busbar, I need it for my Anxiety  "

## 2021-05-17 DIAGNOSIS — F41.9 ANXIETY: ICD-10-CM

## 2021-05-17 RX ORDER — BUSPIRONE HYDROCHLORIDE 10 MG/1
TABLET ORAL
Qty: 60 TABLET | Refills: 0 | Status: SHIPPED | OUTPATIENT
Start: 2021-05-17 | End: 2021-10-28 | Stop reason: HOSPADM

## 2021-05-21 ENCOUNTER — OFFICE VISIT (OUTPATIENT)
Dept: GASTROENTEROLOGY | Facility: CLINIC | Age: 46
End: 2021-05-21
Payer: COMMERCIAL

## 2021-05-21 VITALS
WEIGHT: 158 LBS | SYSTOLIC BLOOD PRESSURE: 138 MMHG | HEIGHT: 64 IN | RESPIRATION RATE: 16 BRPM | BODY MASS INDEX: 26.98 KG/M2 | HEART RATE: 74 BPM | DIASTOLIC BLOOD PRESSURE: 100 MMHG

## 2021-05-21 DIAGNOSIS — R19.7 DIARRHEA, UNSPECIFIED TYPE: ICD-10-CM

## 2021-05-21 DIAGNOSIS — R10.84 GENERALIZED ABDOMINAL PAIN: ICD-10-CM

## 2021-05-21 DIAGNOSIS — K21.9 GASTROESOPHAGEAL REFLUX DISEASE, UNSPECIFIED WHETHER ESOPHAGITIS PRESENT: Primary | ICD-10-CM

## 2021-05-21 PROCEDURE — 3080F DIAST BP >= 90 MM HG: CPT | Performed by: PHYSICIAN ASSISTANT

## 2021-05-21 PROCEDURE — 3008F BODY MASS INDEX DOCD: CPT | Performed by: PHYSICIAN ASSISTANT

## 2021-05-21 PROCEDURE — 99203 OFFICE O/P NEW LOW 30 MIN: CPT | Performed by: PHYSICIAN ASSISTANT

## 2021-05-21 PROCEDURE — 3075F SYST BP GE 130 - 139MM HG: CPT | Performed by: PHYSICIAN ASSISTANT

## 2021-05-21 RX ORDER — PANTOPRAZOLE SODIUM 40 MG/1
40 TABLET, DELAYED RELEASE ORAL 2 TIMES DAILY
Qty: 60 TABLET | Refills: 3 | Status: SHIPPED | OUTPATIENT
Start: 2021-05-21 | End: 2021-09-29

## 2021-05-21 RX ORDER — DICYCLOMINE HCL 20 MG
20 TABLET ORAL EVERY 6 HOURS PRN
Qty: 60 TABLET | Refills: 3 | Status: SHIPPED | OUTPATIENT
Start: 2021-05-21 | End: 2021-12-06

## 2021-05-21 RX ORDER — MONTELUKAST SODIUM 4 MG/1
1 TABLET, CHEWABLE ORAL 2 TIMES DAILY
Qty: 60 TABLET | Refills: 3 | Status: SHIPPED | OUTPATIENT
Start: 2021-05-21 | End: 2021-10-25
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
## 2021-05-28 ENCOUNTER — TELEMEDICINE (OUTPATIENT)
Dept: FAMILY MEDICINE CLINIC | Facility: CLINIC | Age: 46
End: 2021-05-28
Payer: COMMERCIAL

## 2021-05-28 DIAGNOSIS — R73.9 HYPERGLYCEMIA: Primary | ICD-10-CM

## 2021-05-28 DIAGNOSIS — Z11.4 ENCOUNTER FOR SCREENING FOR HIV: ICD-10-CM

## 2021-05-28 PROCEDURE — G2012 BRIEF CHECK IN BY MD/QHP: HCPCS | Performed by: FAMILY MEDICINE

## 2021-05-28 NOTE — PROGRESS NOTES
Virtual Brief Visit    Assessment/Plan:    Problem List Items Addressed This Visit     None            Depression Screening and Follow-up Plan: Continue regular follow-up with their mental health provider who is managing their mental health condition(s)  Patient advised to follow-up with PCP for further management  Reason for visit is No chief complaint on file  Encounter provider Patricia Mckeon MD    Provider located at [address] [address] [phone]    Recent Visits  No visits were found meeting these conditions  Showing recent visits within past 7 days and meeting all other requirements     Future Appointments  No visits were found meeting these conditions  Showing future appointments within next 150 days and meeting all other requirements        After connecting through telephone, the patient was identified by name and date of birth  Rios Gonzalez was informed that this is a telemedicine visit and that the visit is being conducted through telephone  My office door was closed  No one else was in the room  She acknowledged consent and understanding of privacy and security of the platform  The patient has agreed to participate and understands she can discontinue the visit at any time  Patient is aware this is a billable service  Subjective    Rios Gonzalez is a 48 y o  female   Pt for virtual visit about  A FBS of 163  Pt  States she has no polyuria or polydipsia  Pt has a FH x of diabetes          Past Medical History:   Diagnosis Date    Anxiety     [facility] disorder ([facility] [address] ) 2020    Bipolar 1 disorder ([facility] [address] [facility] 2020    Bipolar disease, manic ([facility] [address] [facility]     Depression     Schizophrenia ([facility] [address] [facility]        Past Surgical History:   Procedure Laterality Date     SECTION      x2    HYSTERECTOMY      KNEE SURGERY Left     4 times    LEG SURGERY Right     thony placed then removed, broken femur    NEUROPLASTY / TRANSPOSITION MEDIAN NERVE AT CARPAL TUNNEL         Current Outpatient Medications   Medication Sig Dispense Refill    busPIRone (BUSPAR) 10 mg tablet TAKE 1 TABLET BY MOUTH 2 TIMES A DAY AS NEEDED FOR ANXIETY 60 tablet 0    colestipol (COLESTID) 1 g tablet Take 1 tablet (1 g total) by mouth 2 (two) times a day 60 tablet 3    cyclobenzaprine (FLEXERIL) 10 mg tablet Take 1 tablet (10 mg total) by mouth 3 (three) times a day as needed for muscle spasms 30 tablet 1    dicyclomine (BENTYL) 20 mg tablet Take 1 tablet (20 mg total) by mouth every 6 (six) hours as needed (abdominal pain) 60 tablet 3    furosemide (LASIX) 40 mg tablet Take 1 tablet (40 mg total) by mouth daily as needed (edema) 30 tablet 0    ibuprofen (MOTRIN) 400 mg tablet Take 1 tablet (400 mg total) by mouth every 6 (six) hours as needed for mild pain 80 tablet 2    omeprazole (PriLOSEC) 40 MG capsule Take 1 capsule (40 mg total) by mouth daily before breakfast 90 capsule 0    ondansetron (ZOFRAN-ODT) 4 mg disintegrating tablet Take 1 tablet (4 mg total) by mouth every 6 (six) hours as needed for nausea or vomiting 20 tablet 0    pantoprazole (PROTONIX) 40 mg tablet Take 1 tablet (40 mg total) by mouth 2 (two) times a day 60 tablet 3    PARoxetine (PAXIL) 40 MG tablet Take 1 tablet (40 mg total) by mouth every morning 10 tablet 0    QUEtiapine (SEROquel) 300 mg tablet Take 1 tablet (300 mg total) by mouth daily at bedtime 10 tablet 0    QUEtiapine (SEROquel) 50 mg tablet Take 1 tablet (50 mg total) by mouth daily at bedtime 30 tablet 1    topiramate (TOPAMAX) 100 mg tablet Take 1 tablet (100 mg total) by mouth 2 (two) times a day 20 tablet 0    valACYclovir (VALTREX) 1,000 mg tablet Take 1 tablet (1,000 mg total) by mouth daily 90 tablet 1    valsartan (DIOVAN) 320 MG tablet Take 1 tablet (320 mg total) by mouth daily 30 tablet 1     No current facility-administered medications for this visit           Allergies   Allergen Reactions    Ciprofloxacin Hives    Wound Dressing Adhesive Other (See Comments)     unknown    Atarax [Hydroxyzine] Palpitations       Review of Systems   Constitutional: Negative for activity change, appetite change, fatigue and fever  HENT: Negative for congestion, ear pain, postnasal drip, rhinorrhea, sinus pressure, sinus pain, sneezing and sore throat  Eyes: Negative for pain and redness  Respiratory: Negative for apnea, cough, chest tightness, shortness of breath and wheezing  Cardiovascular: Negative for chest pain, palpitations and leg swelling  Gastrointestinal: Negative for abdominal pain, constipation, diarrhea, nausea and vomiting  Endocrine: Negative for cold intolerance and heat intolerance  Genitourinary: Negative for difficulty urinating, dysuria, frequency, hematuria and urgency  Musculoskeletal: Negative for arthralgias, back pain, gait problem and myalgias  Skin: Negative for rash  Neurological: Negative for dizziness, speech difficulty, weakness, numbness and headaches  Hematological: Does not bruise/bleed easily  Psychiatric/Behavioral: Negative for agitation, confusion and hallucinations  There were no vitals filed for this visit  I spent 15 minutes directly with the patient during this visit    VIRTUAL VISIT DISCLAIMER    Marcello Tyler acknowledges that she has consented to an online visit or consultation  She understands that the online visit is based solely on information provided by her, and that, in the absence of a face-to-face physical evaluation by the physician, the diagnosis she receives is both limited and provisional in terms of accuracy and completeness  This is not intended to replace a full medical face-to-face evaluation by the physician  Marcello Tyler understands and accepts these terms

## 2021-05-29 ENCOUNTER — TELEPHONE (OUTPATIENT)
Dept: GASTROENTEROLOGY | Facility: HOSPITAL | Age: 46
End: 2021-05-29

## 2021-06-01 ENCOUNTER — TELEPHONE (OUTPATIENT)
Dept: GASTROENTEROLOGY | Facility: HOSPITAL | Age: 46
End: 2021-06-01

## 2021-06-02 ENCOUNTER — TELEPHONE (OUTPATIENT)
Dept: GASTROENTEROLOGY | Facility: HOSPITAL | Age: 46
End: 2021-06-02

## 2021-06-03 ENCOUNTER — TELEPHONE (OUTPATIENT)
Dept: GASTROENTEROLOGY | Facility: HOSPITAL | Age: 46
End: 2021-06-03

## 2021-06-04 ENCOUNTER — TELEPHONE (OUTPATIENT)
Dept: GASTROENTEROLOGY | Facility: HOSPITAL | Age: 46
End: 2021-06-04

## 2021-06-07 ENCOUNTER — TELEPHONE (OUTPATIENT)
Dept: GASTROENTEROLOGY | Facility: CLINIC | Age: 46
End: 2021-06-07

## 2021-06-07 NOTE — TELEPHONE ENCOUNTER
Unable to reschedule the patient procedures as I can not get a hold of her via either number on file  Letter sent to her to reschedule procedures

## 2021-06-14 DIAGNOSIS — I10 ESSENTIAL HYPERTENSION: ICD-10-CM

## 2021-06-14 RX ORDER — VALSARTAN 320 MG/1
TABLET ORAL
Qty: 30 TABLET | Refills: 1 | Status: SHIPPED | OUTPATIENT
Start: 2021-06-14 | End: 2021-08-24

## 2021-08-04 ENCOUNTER — TELEPHONE (OUTPATIENT)
Dept: FAMILY MEDICINE CLINIC | Facility: CLINIC | Age: 46
End: 2021-08-04

## 2021-08-04 NOTE — TELEPHONE ENCOUNTER
Attempted to contact both numbers in patient's chart regarding missed appointment  ([phone]) was busy ([phone]) gives a message that the call was rejected and then it hangs up  98.6

## 2021-08-24 DIAGNOSIS — I10 ESSENTIAL HYPERTENSION: ICD-10-CM

## 2021-08-24 RX ORDER — VALSARTAN 320 MG/1
TABLET ORAL
Qty: 30 TABLET | Refills: 1 | Status: ON HOLD | OUTPATIENT
Start: 2021-08-24 | End: 2021-10-27

## 2021-09-04 ENCOUNTER — NURSE TRIAGE (OUTPATIENT)
Dept: OTHER | Facility: OTHER | Age: 46
End: 2021-09-04

## 2021-09-04 NOTE — TELEPHONE ENCOUNTER
Regarding: COVID-19 - Symptomatic  ----- Message from Hugo Chong sent at 9/4/2021 12:56 PM EDT -----  "I am coughing and I am congested "

## 2021-09-25 ENCOUNTER — HOSPITAL ENCOUNTER (INPATIENT)
Facility: HOSPITAL | Age: 46
LOS: 2 days | Discharge: HOME | DRG: 383 | End: 2021-09-28
Attending: ORTHOPAEDIC SURGERY | Admitting: ORTHOPAEDIC SURGERY
Payer: COMMERCIAL

## 2021-09-25 ENCOUNTER — APPOINTMENT (EMERGENCY)
Dept: CT IMAGING | Facility: HOSPITAL | Age: 46
End: 2021-09-25
Payer: COMMERCIAL

## 2021-09-25 ENCOUNTER — HOSPITAL ENCOUNTER (EMERGENCY)
Facility: HOSPITAL | Age: 46
End: 2021-09-25
Attending: EMERGENCY MEDICINE | Admitting: EMERGENCY MEDICINE
Payer: COMMERCIAL

## 2021-09-25 VITALS
RESPIRATION RATE: 18 BRPM | SYSTOLIC BLOOD PRESSURE: 183 MMHG | DIASTOLIC BLOOD PRESSURE: 93 MMHG | OXYGEN SATURATION: 99 % | TEMPERATURE: 98.5 F | HEART RATE: 79 BPM

## 2021-09-25 DIAGNOSIS — L08.9 FINGER INFECTION: Primary | ICD-10-CM

## 2021-09-25 DIAGNOSIS — K21.9 GASTROESOPHAGEAL REFLUX DISEASE WITHOUT ESOPHAGITIS: ICD-10-CM

## 2021-09-25 DIAGNOSIS — E78.2 MIXED HYPERLIPIDEMIA: ICD-10-CM

## 2021-09-25 DIAGNOSIS — I10 ESSENTIAL HYPERTENSION: ICD-10-CM

## 2021-09-25 DIAGNOSIS — Z72.0 TOBACCO ABUSE: ICD-10-CM

## 2021-09-25 DIAGNOSIS — L03.114 CELLULITIS OF LEFT HAND: ICD-10-CM

## 2021-09-25 DIAGNOSIS — Z00.00 WELL ADULT EXAM: ICD-10-CM

## 2021-09-25 DIAGNOSIS — L02.512 ABSCESS OF LEFT INDEX FINGER: Primary | ICD-10-CM

## 2021-09-25 LAB
ANION GAP SERPL CALCULATED.3IONS-SCNC: 10 MMOL/L (ref 4–13)
BASOPHILS # BLD AUTO: 0.08 THOUSANDS/ΜL (ref 0–0.1)
BASOPHILS NFR BLD AUTO: 1 % (ref 0–1)
BUN SERPL-MCNC: 12 MG/DL (ref 5–25)
CALCIUM SERPL-MCNC: 8.7 MG/DL (ref 8.3–10.1)
CHLORIDE SERPL-SCNC: 105 MMOL/L (ref 100–108)
CO2 SERPL-SCNC: 24 MMOL/L (ref 21–32)
CREAT SERPL-MCNC: 0.51 MG/DL (ref 0.6–1.3)
EOSINOPHIL # BLD AUTO: 0.44 THOUSAND/ΜL (ref 0–0.61)
EOSINOPHIL NFR BLD AUTO: 5 % (ref 0–6)
ERYTHROCYTE [DISTWIDTH] IN BLOOD BY AUTOMATED COUNT: 13 % (ref 11.6–15.1)
GFR SERPL CREATININE-BSD FRML MDRD: 114 ML/MIN/1.73SQ M
GLUCOSE SERPL-MCNC: 83 MG/DL (ref 65–140)
HCT VFR BLD AUTO: 39.1 % (ref 34.8–46.1)
HGB BLD-MCNC: 13.4 G/DL (ref 11.5–15.4)
IMM GRANULOCYTES # BLD AUTO: 0.03 THOUSAND/UL (ref 0–0.2)
IMM GRANULOCYTES NFR BLD AUTO: 0 % (ref 0–2)
LACTATE SERPL-SCNC: 0.8 MMOL/L (ref 0.5–2)
LYMPHOCYTES # BLD AUTO: 1.58 THOUSANDS/ΜL (ref 0.6–4.47)
LYMPHOCYTES NFR BLD AUTO: 18 % (ref 14–44)
MCH RBC QN AUTO: 29.1 PG (ref 26.8–34.3)
MCHC RBC AUTO-ENTMCNC: 34.3 G/DL (ref 31.4–37.4)
MCV RBC AUTO: 85 FL (ref 82–98)
MONOCYTES # BLD AUTO: 0.81 THOUSAND/ΜL (ref 0.17–1.22)
MONOCYTES NFR BLD AUTO: 9 % (ref 4–12)
NEUTROPHILS # BLD AUTO: 5.71 THOUSANDS/ΜL (ref 1.85–7.62)
NEUTS SEG NFR BLD AUTO: 67 % (ref 43–75)
NRBC BLD AUTO-RTO: 0 /100 WBCS
PLATELET # BLD AUTO: 248 THOUSANDS/UL (ref 149–390)
PMV BLD AUTO: 9.4 FL (ref 8.9–12.7)
POTASSIUM SERPL-SCNC: 3.6 MMOL/L (ref 3.5–5.3)
RBC # BLD AUTO: 4.6 MILLION/UL (ref 3.81–5.12)
SODIUM SERPL-SCNC: 139 MMOL/L (ref 136–145)
WBC # BLD AUTO: 8.65 THOUSAND/UL (ref 4.31–10.16)

## 2021-09-25 PROCEDURE — 99284 EMERGENCY DEPT VISIT MOD MDM: CPT

## 2021-09-25 PROCEDURE — 96365 THER/PROPH/DIAG IV INF INIT: CPT

## 2021-09-25 PROCEDURE — 85025 COMPLETE CBC W/AUTO DIFF WBC: CPT | Performed by: EMERGENCY MEDICINE

## 2021-09-25 PROCEDURE — 73201 CT UPPER EXTREMITY W/DYE: CPT

## 2021-09-25 PROCEDURE — 36415 COLL VENOUS BLD VENIPUNCTURE: CPT | Performed by: EMERGENCY MEDICINE

## 2021-09-25 PROCEDURE — 83605 ASSAY OF LACTIC ACID: CPT | Performed by: EMERGENCY MEDICINE

## 2021-09-25 PROCEDURE — 96375 TX/PRO/DX INJ NEW DRUG ADDON: CPT

## 2021-09-25 PROCEDURE — 96374 THER/PROPH/DIAG INJ IV PUSH: CPT

## 2021-09-25 PROCEDURE — 80048 BASIC METABOLIC PNL TOTAL CA: CPT | Performed by: EMERGENCY MEDICINE

## 2021-09-25 PROCEDURE — 87040 BLOOD CULTURE FOR BACTERIA: CPT | Performed by: EMERGENCY MEDICINE

## 2021-09-25 PROCEDURE — 99285 EMERGENCY DEPT VISIT HI MDM: CPT | Performed by: EMERGENCY MEDICINE

## 2021-09-25 RX ORDER — LORAZEPAM 2 MG/ML
1 INJECTION INTRAMUSCULAR ONCE
Status: DISCONTINUED | OUTPATIENT
Start: 2021-09-25 | End: 2021-09-25

## 2021-09-25 RX ORDER — QUETIAPINE FUMARATE 200 MG/1
200 TABLET, FILM COATED ORAL ONCE
Status: COMPLETED | OUTPATIENT
Start: 2021-09-25 | End: 2021-09-25

## 2021-09-25 RX ORDER — LIDOCAINE HYDROCHLORIDE 10 MG/ML
10 INJECTION, SOLUTION EPIDURAL; INFILTRATION; INTRACAUDAL; PERINEURAL ONCE
Status: COMPLETED | OUTPATIENT
Start: 2021-09-25 | End: 2021-09-25

## 2021-09-25 RX ORDER — HYDROMORPHONE HCL/PF 1 MG/ML
0.5 SYRINGE (ML) INJECTION ONCE
Status: COMPLETED | OUTPATIENT
Start: 2021-09-25 | End: 2021-09-25

## 2021-09-25 RX ORDER — LORAZEPAM 2 MG/ML
1 INJECTION INTRAMUSCULAR ONCE
Status: COMPLETED | OUTPATIENT
Start: 2021-09-25 | End: 2021-09-25

## 2021-09-25 RX ORDER — KETOROLAC TROMETHAMINE 30 MG/ML
15 INJECTION, SOLUTION INTRAMUSCULAR; INTRAVENOUS ONCE
Status: COMPLETED | OUTPATIENT
Start: 2021-09-25 | End: 2021-09-25

## 2021-09-25 RX ADMIN — CEFTRIAXONE SODIUM 1000 MG: 10 INJECTION, POWDER, FOR SOLUTION INTRAVENOUS at 16:43

## 2021-09-25 RX ADMIN — HYDROMORPHONE HYDROCHLORIDE 0.5 MG: 1 INJECTION, SOLUTION INTRAMUSCULAR; INTRAVENOUS; SUBCUTANEOUS at 23:24

## 2021-09-25 RX ADMIN — LORAZEPAM 1 MG: 2 INJECTION INTRAMUSCULAR; INTRAVENOUS at 23:13

## 2021-09-25 RX ADMIN — QUETIAPINE FUMARATE 200 MG: 200 TABLET ORAL at 20:36

## 2021-09-25 RX ADMIN — IOHEXOL 100 ML: 350 INJECTION, SOLUTION INTRAVENOUS at 17:36

## 2021-09-25 RX ADMIN — KETOROLAC TROMETHAMINE 15 MG: 30 INJECTION, SOLUTION INTRAMUSCULAR; INTRAVENOUS at 16:39

## 2021-09-25 RX ADMIN — LIDOCAINE HYDROCHLORIDE 10 ML: 10 INJECTION, SOLUTION EPIDURAL; INFILTRATION; INTRACAUDAL; PERINEURAL at 23:01

## 2021-09-25 NOTE — ED PROVIDER NOTES
History  Chief Complaint   Patient presents with   [address]     onset 3 days ago, pt w/ swelling and pain to L hand  was put on abx yesterday but no relief      HPI    Prior to Admission Medications   Prescriptions Last Dose Informant Patient Reported? Taking?    PARoxetine (PAXIL) 40 MG tablet  Self No No   Sig: Take 1 tablet (40 mg total) by mouth every morning   QUEtiapine (SEROquel) 300 mg tablet  Self No No   Sig: Take 1 tablet (300 mg total) by mouth daily at bedtime   QUEtiapine (SEROquel) 50 mg tablet  Self No No   Sig: Take 1 tablet (50 mg total) by mouth daily at bedtime   busPIRone (BUSPAR) 10 mg tablet  Self No No   Sig: TAKE 1 TABLET BY MOUTH 2 TIMES A DAY AS NEEDED FOR ANXIETY   colestipol (COLESTID) 1 g tablet   No No   Sig: Take 1 tablet (1 g total) by mouth 2 (two) times a day   cyclobenzaprine (FLEXERIL) 10 mg tablet  Self No No   Sig: Take 1 tablet (10 mg total) by mouth 3 (three) times a day as needed for muscle spasms   dicyclomine (BENTYL) 20 mg tablet   No No   Sig: Take 1 tablet (20 mg total) by mouth every 6 (six) hours as needed (abdominal pain)   furosemide (LASIX) 40 mg tablet  Self No No   Sig: Take 1 tablet (40 mg total) by mouth daily as needed (edema)   ibuprofen (MOTRIN) 400 mg tablet  Self No No   Sig: Take 1 tablet (400 mg total) by mouth every 6 (six) hours as needed for mild pain   omeprazole (PriLOSEC) 40 MG capsule  Self No No   Sig: Take 1 capsule (40 mg total) by mouth daily before breakfast   ondansetron (ZOFRAN-ODT) 4 mg disintegrating tablet  Self No No   Sig: Take 1 tablet (4 mg total) by mouth every 6 (six) hours as needed for nausea or vomiting   pantoprazole (PROTONIX) 40 mg tablet   No No   Sig: Take 1 tablet (40 mg total) by mouth 2 (two) times a day   topiramate (TOPAMAX) 100 mg tablet  Self No No   Sig: Take 1 tablet (100 mg total) by mouth 2 (two) times a day   valACYclovir (VALTREX) 1,000 mg tablet   No No   Sig: Take 1 tablet (1,000 mg total) by mouth daily valsartan (DIOVAN) 320 MG tablet   No No   Sig: TAKE 1 TABLET BY MOUTH EVERY DAY      Facility-Administered Medications: None       Past Medical History:   Diagnosis Date    Anxiety     Bipolar 1 disorder ([facility] ) 2020    Bipolar 1 disorder ([place] [address] ) 2020    Bipolar disease, manic ([place] [address] )     Depression     Schizophrenia ([place] [address] )        Past Surgical History:   Procedure Laterality Date     SECTION      x2    HYSTERECTOMY      KNEE SURGERY Left     4 times    LEG SURGERY Right     thony placed then removed, broken femur    NEUROPLASTY / TRANSPOSITION MEDIAN NERVE AT CARPAL TUNNEL         Family History   Family history unknown: Yes     I have reviewed and agree with the history as documented  E-Cigarette/Vaping    E-Cigarette Use Never User      E-Cigarette/Vaping Substances    Nicotine No     THC No     CBD No     Flavoring No     Other No     Unknown No      Social History     Tobacco Use    Smoking status: Current Every Day Smoker     Packs/day: 0 25     Types: Cigarettes    Smokeless tobacco: Never Used   Vaping Use    Vaping Use: Never used   Substance Use Topics    Alcohol use: No    Drug use: Not Currently     Types: Methamphetamines     Comment: Pt reports having used aprox 4 days ago       Review of Systems    Physical Exam  Physical Exam  Vitals and nursing note reviewed  Constitutional:       General: She is not in acute distress  Appearance: She is well-developed  Comments: Hypertensive   HENT:      Head: Normocephalic and atraumatic  Eyes:      Conjunctiva/sclera: Conjunctivae normal       Pupils: Pupils are equal, round, and reactive to light  Neck:      Trachea: No tracheal deviation  Cardiovascular:      Rate and Rhythm: Normal rate and regular rhythm  Pulses:           Radial pulses are 2+ on the left side  Heart sounds: Normal heart sounds  Pulmonary:      Effort: Pulmonary effort is normal  No respiratory distress        Breath sounds: Normal breath sounds  Abdominal:      General: There is no distension  Palpations: Abdomen is soft  Tenderness: There is no abdominal tenderness  Musculoskeletal:      Left wrist: No swelling  Normal range of motion  Left hand: Swelling (through hand, worst 2nd finger) and tenderness (worse 2nd finger, volar distal phalanx and lateral finger) present  No deformity, lacerations or bony tenderness  Decreased range of motion (2nd finger; held partial flexion, decreased flexion and extension)  Normal sensation  Normal capillary refill  Cervical back: Normal range of motion  Comments: Patchy erythema to hand, extending past volar wrist  Swelling throughout hand  Lymphadenopathy:      Upper Body:      Left upper body: No axillary adenopathy  Skin:     General: Skin is warm and dry  Neurological:      Mental Status: She is alert and oriented to person, place, and time  GCS: GCS eye subscore is 4  GCS verbal subscore is 5  GCS motor subscore is 6     Psychiatric:         Behavior: Behavior normal                            Vital Signs  ED Triage Vitals [09/25/21 1453]   Temperature Pulse Respirations Blood Pressure SpO2   98 5 °F (36 9 °C) 87 16 (!) 197/90 96 %      Temp src Heart Rate Source Patient Position - Orthostatic VS BP Location FiO2 (%)   -- Monitor Sitting Right arm --      Pain Score       --           Vitals:    09/25/21 1453   BP: (!) 197/90   Pulse: 87   Patient Position - Orthostatic VS: Sitting         Visual Acuity      ED Medications  Medications   ceftriaxone (ROCEPHIN) 1 g/50 mL in dextrose IVPB (0 mg Intravenous Stopped 9/25/21 1713)   ketorolac (TORADOL) injection 15 mg (15 mg Intravenous Given 9/25/21 1639)   iohexol (OMNIPAQUE) 350 MG/ML injection (SINGLE-DOSE) 100 mL (100 mL Intravenous Given 9/25/21 1736)   QUEtiapine (SEROquel) tablet 200 mg (200 mg Oral Given 9/25/21 2036)       Diagnostic Studies  Results Reviewed     Procedure Component Value Units Date/Time    Lactic acid [[id number]]  (Normal) Collected: 09/25/21 1631    Lab Status: Final result Specimen: Blood from Arm, Left Updated: 09/25/21 1706     LACTIC ACID 0 8 mmol/L     Narrative:      Result may be elevated if tourniquet was used during collection      Basic metabolic panel [[id number]]  (Abnormal) Collected: 09/25/21 1631    Lab Status: Final result Specimen: Blood from Arm, Left Updated: 09/25/21 1658     Sodium 139 mmol/L      Potassium 3 6 mmol/L      Chloride 105 mmol/L      CO2 24 mmol/L      ANION GAP 10 mmol/L      BUN 12 mg/dL      Creatinine 0 51 mg/dL      Glucose 83 mg/dL      Calcium 8 7 mg/dL      eGFR 114 ml/min/1 73sq m     Narrative:      [place] guidelines for Chronic Kidney Disease (CKD):     Stage 1 with normal or high GFR (GFR > 90 mL/min/1 73 square meters)    Stage 2 Mild CKD (GFR = 60-89 mL/min/1 73 square meters)    Stage 3A Moderate CKD (GFR = 45-59 mL/min/1 73 square meters)    Stage 3B Moderate CKD (GFR = 30-44 mL/min/1 73 square meters)    Stage 4 Severe CKD (GFR = 15-29 mL/min/1 73 square meters)    Stage 5 End Stage CKD (GFR <15 mL/min/1 73 square meters)  Note: GFR calculation is accurate only with a steady state creatinine    CBC and differential [[id number]] Collected: 09/25/21 1631    Lab Status: Final result Specimen: Blood from Arm, Left Updated: 09/25/21 1650     WBC 8 65 Thousand/uL      RBC 4 60 Million/uL      Hemoglobin 13 4 g/dL      Hematocrit 39 1 %      MCV 85 fL      MCH 29 1 pg      MCHC 34 3 g/dL      RDW 13 0 %      MPV 9 4 fL      Platelets 688 Thousands/uL      nRBC 0 /100 WBCs      Neutrophils Relative 67 %      Immat GRANS % 0 %      Lymphocytes Relative 18 %      Monocytes Relative 9 %      Eosinophils Relative 5 %      Basophils Relative 1 %      Neutrophils Absolute 5 71 Thousands/µL      Immature Grans Absolute 0 03 Thousand/uL      Lymphocytes Absolute 1 58 Thousands/µL      Monocytes Absolute 0 81 Thousand/µL Eosinophils Absolute 0 44 Thousand/µL      Basophils Absolute 0 08 Thousands/µL     Blood culture #1 [[id number]] Collected: 09/25/21 1631    Lab Status: In process Specimen: Blood from Arm, Right Updated: 09/25/21 1644    Blood culture #2 [[id number]] Collected: 09/25/21 1631    Lab Status: In process Specimen: Blood from Arm, Left Updated: 09/25/21 1644                 CT upper extremity w contrast left   Final Result by Nicholas Peterson MD (09/25 1914)      There is a 1 0 x 0 8 x 0 8 cm subcutaneous fluid collection on the volar aspect of the 2nd (index) finger tip consistent with abscess given history of (series 4 image 15 and series 602 image 73 )      There is no evidence of tenosynovitis in the left hand and wrist            Workstation performed: [id number]                    Procedures  Procedures         ED Course                                           MDM  Number of Diagnoses or Management Options  Abscess of left index finger: new and requires workup  Cellulitis of left hand: new and requires workup  Diagnosis management comments: This is a 43-year-old left-hand dominant female who presents here today for evaluation of left hand pain, swelling, and redness  She states five days ago she had a superficial scratch to the dorsal surface of her left second finger, just proximal to the nail  Four days ago, she was cleaning the basement after it had flooded, with retained stagnant water  Three days ago she states the finger started itching and burning, and two days ago began turning red  Since then, she has had worsening redness, swelling, and pain  She was seen at urgent care yesterday, prescribed amoxicillin and told that the finger was from an "insect bite "  She denies knowingly being bitten by or coming in contact with an insect  Today she woke up with a "blister" on the palmar side of the distal phalanx of the second finger    She did try to stick a needle into the side of her finger yesterday to try and drain pus, but just got a small amount of blood  She said the pain and swelling has been worsening since onset two days ago  She denies fevers, myalgias, arthralgias, systemic symptoms  Her cat is up-to-date on vaccines, and her last tetanus shot was within the past five years  She has taken three doses of the antibiotic since she was prescribed it  Review of systems:  Otherwise negative unless stated above    She is well-appearing, in no acute distress  She does have moderate swelling to the left second finger, held mildly in flexion, with decreased range of motion due to pain  There is significant tenderness diffusely to the finger, worse on the lateral sides, and over the volar surface of the distal phalanx where she has ecchymoses  She has less than 2 second capillary refill, and sensation in the finger  There is a healing superficial abrasion to the dorsal second finger just proximal to the nail  There is swelling throughout the hand, patient erythema crossing the volar surface of the wrist   She does have tenderness up to the mid forearm  There are no areas of fluctuance or induration, or other open wounds  She has normal range of motion of the other fingers and at the wrist   Exam is otherwise unremarkable  Concern is for infection from unrecognized injury while cleaning the basement more likely intact scratch fever, given lack of actual fever or any axillary lymphadenopathy  She may have developing flexor tenosynovitis given degree of swelling and tenderness, with positioning, however pain is worst over area of swelling, and laterally, as opposed to over the whole flexor surface  We will check lab work and CT scan to evaluate this further, and initiate IV antibiotics    Though duration of antibiotic is too short to call this a true failure of outpatient therapy, with rapid worsening despite antibiotics she would likely benefit from admission for IV antibiotics to ensure this is improving  Lab work is unremarkable  CT scan shows focal abscess without obvious signs of flexor tenosynovitis  I did discuss the patient with Dr Lucillie Cabot, was on-call for Hand surgery, whether she can have bedside I&D and admission for antibiotics here, versus need for transfer, and he states that if the patient requires admission should be transferred to [place]  He accepts her as a transfer to the ED at [place] [facility]  I updated the patient on findings and plan of care, and she expresses understanding  Amount and/or Complexity of Data Reviewed  Clinical lab tests: reviewed and ordered  Tests in the radiology section of CPT®: ordered and reviewed  Discuss the patient with other providers: yes  Independent visualization of images, tracings, or specimens: yes        Disposition  Final diagnoses:   Abscess of left index finger   Cellulitis of left hand     Time reflects when diagnosis was documented in both MDM as applicable and the Disposition within this note     Time User Action Codes Description Comment    9/25/2021  7:47 PM Sheela Gray Add [L02 512] Abscess of left index finger     9/25/2021  7:47 PM Sheela Gray Add [L03 114] Cellulitis of left hand       ED Disposition     ED Disposition Condition Date/Time Comment    Transfer to Another [employer]  [facility] Sep 25, 2021  7:47 PM Joel Cancer should be transferred out to [facility]  Follow-up Information    None         Patient's Medications   Discharge Prescriptions    No medications on file     No discharge procedures on file      PDMP Review     None          ED Provider  Electronically Signed by           Roseanne Cosme MD  09/25/21 3304

## 2021-09-26 ENCOUNTER — APPOINTMENT (INPATIENT)
Dept: RADIOLOGY | Facility: HOSPITAL | Age: 46
DRG: 383 | End: 2021-09-26
Payer: COMMERCIAL

## 2021-09-26 PROBLEM — L08.9 FINGER INFECTION: Status: ACTIVE | Noted: 2021-09-26

## 2021-09-26 LAB
ANION GAP SERPL CALCULATED.3IONS-SCNC: 6 MMOL/L (ref 4–13)
BUN SERPL-MCNC: 14 MG/DL (ref 5–25)
CALCIUM SERPL-MCNC: 8.4 MG/DL (ref 8.3–10.1)
CHLORIDE SERPL-SCNC: 109 MMOL/L (ref 100–108)
CO2 SERPL-SCNC: 23 MMOL/L (ref 21–32)
CREAT SERPL-MCNC: 0.54 MG/DL (ref 0.6–1.3)
GFR SERPL CREATININE-BSD FRML MDRD: 112 ML/MIN/1.73SQ M
GLUCOSE SERPL-MCNC: 119 MG/DL (ref 65–140)
MAGNESIUM SERPL-MCNC: 2.1 MG/DL (ref 1.6–2.6)
PHOSPHATE SERPL-MCNC: 3.6 MG/DL (ref 2.7–4.5)
POTASSIUM SERPL-SCNC: 3.8 MMOL/L (ref 3.5–5.3)
SODIUM SERPL-SCNC: 138 MMOL/L (ref 136–145)

## 2021-09-26 PROCEDURE — 84100 ASSAY OF PHOSPHORUS: CPT

## 2021-09-26 PROCEDURE — 99222 1ST HOSP IP/OBS MODERATE 55: CPT | Performed by: ORTHOPAEDIC SURGERY

## 2021-09-26 PROCEDURE — 36415 COLL VENOUS BLD VENIPUNCTURE: CPT

## 2021-09-26 PROCEDURE — 99253 IP/OBS CNSLTJ NEW/EST LOW 45: CPT | Performed by: INTERNAL MEDICINE

## 2021-09-26 PROCEDURE — 80048 BASIC METABOLIC PNL TOTAL CA: CPT

## 2021-09-26 PROCEDURE — 71045 X-RAY EXAM CHEST 1 VIEW: CPT

## 2021-09-26 PROCEDURE — 83735 ASSAY OF MAGNESIUM: CPT

## 2021-09-26 PROCEDURE — NC001 PR NO CHARGE: Performed by: ORTHOPAEDIC SURGERY

## 2021-09-26 RX ORDER — CYCLOBENZAPRINE HCL 10 MG
10 TABLET ORAL 3 TIMES DAILY PRN
Status: DISCONTINUED | OUTPATIENT
Start: 2021-09-26 | End: 2021-09-28 | Stop reason: HOSPADM

## 2021-09-26 RX ORDER — ACETAMINOPHEN 325 MG/1
650 TABLET ORAL EVERY 4 HOURS PRN
Status: DISCONTINUED | OUTPATIENT
Start: 2021-09-26 | End: 2021-09-28 | Stop reason: HOSPADM

## 2021-09-26 RX ORDER — ONDANSETRON 4 MG/1
4 TABLET, ORALLY DISINTEGRATING ORAL EVERY 6 HOURS PRN
Status: DISCONTINUED | OUTPATIENT
Start: 2021-09-26 | End: 2021-09-28 | Stop reason: HOSPADM

## 2021-09-26 RX ORDER — OXYCODONE HYDROCHLORIDE 5 MG/1
5 TABLET ORAL EVERY 4 HOURS PRN
Status: DISCONTINUED | OUTPATIENT
Start: 2021-09-26 | End: 2021-09-28 | Stop reason: HOSPADM

## 2021-09-26 RX ORDER — QUETIAPINE FUMARATE 300 MG/1
300 TABLET, FILM COATED ORAL
Status: DISCONTINUED | OUTPATIENT
Start: 2021-09-26 | End: 2021-09-28 | Stop reason: HOSPADM

## 2021-09-26 RX ORDER — LOSARTAN POTASSIUM 50 MG/1
100 TABLET ORAL DAILY
Status: DISCONTINUED | OUTPATIENT
Start: 2021-09-26 | End: 2021-09-28 | Stop reason: HOSPADM

## 2021-09-26 RX ORDER — HYDROMORPHONE HCL/PF 1 MG/ML
1 SYRINGE (ML) INJECTION EVERY 4 HOURS PRN
Status: DISCONTINUED | OUTPATIENT
Start: 2021-09-26 | End: 2021-09-26

## 2021-09-26 RX ORDER — PAROXETINE HYDROCHLORIDE 20 MG/1
40 TABLET, FILM COATED ORAL EVERY MORNING
Status: DISCONTINUED | OUTPATIENT
Start: 2021-09-26 | End: 2021-09-28 | Stop reason: HOSPADM

## 2021-09-26 RX ORDER — PANTOPRAZOLE SODIUM 40 MG/1
40 TABLET, DELAYED RELEASE ORAL
Status: DISCONTINUED | OUTPATIENT
Start: 2021-09-26 | End: 2021-09-28 | Stop reason: HOSPADM

## 2021-09-26 RX ORDER — OXYCODONE HYDROCHLORIDE 5 MG/1
10 TABLET ORAL EVERY 4 HOURS PRN
Status: DISCONTINUED | OUTPATIENT
Start: 2021-09-26 | End: 2021-09-28 | Stop reason: HOSPADM

## 2021-09-26 RX ORDER — QUETIAPINE FUMARATE 25 MG/1
50 TABLET, FILM COATED ORAL
Status: DISCONTINUED | OUTPATIENT
Start: 2021-09-26 | End: 2021-09-28 | Stop reason: HOSPADM

## 2021-09-26 RX ORDER — TOPIRAMATE 100 MG/1
100 TABLET, FILM COATED ORAL 2 TIMES DAILY
Status: DISCONTINUED | OUTPATIENT
Start: 2021-09-26 | End: 2021-09-28 | Stop reason: HOSPADM

## 2021-09-26 RX ORDER — HYDROMORPHONE HCL/PF 1 MG/ML
0.5 SYRINGE (ML) INJECTION EVERY 4 HOURS PRN
Status: DISCONTINUED | OUTPATIENT
Start: 2021-09-26 | End: 2021-09-28 | Stop reason: HOSPADM

## 2021-09-26 RX ORDER — HEPARIN SODIUM 5000 [USP'U]/ML
5000 INJECTION, SOLUTION INTRAVENOUS; SUBCUTANEOUS EVERY 8 HOURS SCHEDULED
Status: DISCONTINUED | OUTPATIENT
Start: 2021-09-26 | End: 2021-09-28 | Stop reason: HOSPADM

## 2021-09-26 RX ADMIN — TOPIRAMATE 100 MG: 100 TABLET, FILM COATED ORAL at 11:47

## 2021-09-26 RX ADMIN — PANTOPRAZOLE SODIUM 40 MG: 40 TABLET, DELAYED RELEASE ORAL at 17:29

## 2021-09-26 RX ADMIN — QUETIAPINE FUMARATE 200 MG: 100 TABLET ORAL at 21:10

## 2021-09-26 RX ADMIN — TOPIRAMATE 100 MG: 100 TABLET, FILM COATED ORAL at 17:30

## 2021-09-26 RX ADMIN — HYDROMORPHONE HYDROCHLORIDE 0.5 MG: 1 INJECTION, SOLUTION INTRAMUSCULAR; INTRAVENOUS; SUBCUTANEOUS at 13:26

## 2021-09-26 RX ADMIN — AMPICILLIN SODIUM AND SULBACTAM SODIUM 3 G: 2; 1 INJECTION, POWDER, FOR SOLUTION INTRAMUSCULAR; INTRAVENOUS at 08:58

## 2021-09-26 RX ADMIN — HEPARIN SODIUM 5000 UNITS: 5000 INJECTION INTRAVENOUS; SUBCUTANEOUS at 21:09

## 2021-09-26 RX ADMIN — OXYCODONE HYDROCHLORIDE 10 MG: 10 TABLET ORAL at 03:48

## 2021-09-26 RX ADMIN — HYDROMORPHONE HYDROCHLORIDE 0.5 MG: 1 INJECTION, SOLUTION INTRAMUSCULAR; INTRAVENOUS; SUBCUTANEOUS at 20:59

## 2021-09-26 RX ADMIN — LOSARTAN POTASSIUM 100 MG: 50 TABLET, FILM COATED ORAL at 13:14

## 2021-09-26 RX ADMIN — OXYCODONE HYDROCHLORIDE 10 MG: 10 TABLET ORAL at 09:59

## 2021-09-26 RX ADMIN — OXYCODONE HYDROCHLORIDE 10 MG: 10 TABLET ORAL at 17:29

## 2021-09-26 RX ADMIN — PAROXETINE HYDROCHLORIDE 40 MG: 20 TABLET, FILM COATED ORAL at 11:47

## 2021-09-26 RX ADMIN — AMPICILLIN SODIUM AND SULBACTAM SODIUM 3 G: 2; 1 INJECTION, POWDER, FOR SOLUTION INTRAMUSCULAR; INTRAVENOUS at 01:18

## 2021-09-26 RX ADMIN — AMPICILLIN SODIUM AND SULBACTAM SODIUM 3 G: 2; 1 INJECTION, POWDER, FOR SOLUTION INTRAMUSCULAR; INTRAVENOUS at 18:07

## 2021-09-26 RX ADMIN — AMPICILLIN SODIUM AND SULBACTAM SODIUM 3 G: 2; 1 INJECTION, POWDER, FOR SOLUTION INTRAMUSCULAR; INTRAVENOUS at 13:13

## 2021-09-26 NOTE — ED NOTES
Transfer information:    Transfer to: [facility] ED    P/U: 2045 with [facility]    Accepting: Dr Gilbert Iyer    Report: Rene Carrington RN  09/25/21 2007

## 2021-09-27 PROCEDURE — 99231 SBSQ HOSP IP/OBS SF/LOW 25: CPT | Performed by: ORTHOPAEDIC SURGERY

## 2021-09-27 RX ADMIN — OXYCODONE HYDROCHLORIDE 10 MG: 10 TABLET ORAL at 06:11

## 2021-09-27 RX ADMIN — QUETIAPINE FUMARATE 200 MG: 100 TABLET ORAL at 20:52

## 2021-09-27 RX ADMIN — TOPIRAMATE 100 MG: 100 TABLET, FILM COATED ORAL at 09:19

## 2021-09-27 RX ADMIN — AMPICILLIN SODIUM AND SULBACTAM SODIUM 3 G: 2; 1 INJECTION, POWDER, FOR SOLUTION INTRAMUSCULAR; INTRAVENOUS at 06:12

## 2021-09-27 RX ADMIN — PANTOPRAZOLE SODIUM 40 MG: 40 TABLET, DELAYED RELEASE ORAL at 17:35

## 2021-09-27 RX ADMIN — HEPARIN SODIUM 5000 UNITS: 5000 INJECTION INTRAVENOUS; SUBCUTANEOUS at 20:52

## 2021-09-27 RX ADMIN — HEPARIN SODIUM 5000 UNITS: 5000 INJECTION INTRAVENOUS; SUBCUTANEOUS at 13:01

## 2021-09-27 RX ADMIN — LOSARTAN POTASSIUM 100 MG: 50 TABLET, FILM COATED ORAL at 09:18

## 2021-09-27 RX ADMIN — PAROXETINE HYDROCHLORIDE 40 MG: 20 TABLET, FILM COATED ORAL at 09:18

## 2021-09-27 RX ADMIN — AMPICILLIN SODIUM AND SULBACTAM SODIUM 3 G: 2; 1 INJECTION, POWDER, FOR SOLUTION INTRAMUSCULAR; INTRAVENOUS at 01:24

## 2021-09-27 RX ADMIN — HYDROMORPHONE HYDROCHLORIDE 0.5 MG: 1 INJECTION, SOLUTION INTRAMUSCULAR; INTRAVENOUS; SUBCUTANEOUS at 09:18

## 2021-09-27 RX ADMIN — OXYCODONE HYDROCHLORIDE 10 MG: 10 TABLET ORAL at 12:57

## 2021-09-27 RX ADMIN — TOPIRAMATE 100 MG: 100 TABLET, FILM COATED ORAL at 17:35

## 2021-09-27 RX ADMIN — OXYCODONE HYDROCHLORIDE 10 MG: 10 TABLET ORAL at 19:56

## 2021-09-27 RX ADMIN — PANTOPRAZOLE SODIUM 40 MG: 40 TABLET, DELAYED RELEASE ORAL at 06:11

## 2021-09-27 RX ADMIN — AMPICILLIN SODIUM AND SULBACTAM SODIUM 3 G: 2; 1 INJECTION, POWDER, FOR SOLUTION INTRAMUSCULAR; INTRAVENOUS at 18:01

## 2021-09-27 RX ADMIN — AMPICILLIN SODIUM AND SULBACTAM SODIUM 3 G: 2; 1 INJECTION, POWDER, FOR SOLUTION INTRAMUSCULAR; INTRAVENOUS at 12:56

## 2021-09-27 RX ADMIN — HYDROMORPHONE HYDROCHLORIDE 0.5 MG: 1 INJECTION, SOLUTION INTRAMUSCULAR; INTRAVENOUS; SUBCUTANEOUS at 15:24

## 2021-09-27 RX ADMIN — HYDROMORPHONE HYDROCHLORIDE 0.5 MG: 1 INJECTION, SOLUTION INTRAMUSCULAR; INTRAVENOUS; SUBCUTANEOUS at 20:52

## 2021-09-27 RX ADMIN — OXYCODONE HYDROCHLORIDE 10 MG: 10 TABLET ORAL at 02:14

## 2021-09-28 VITALS
OXYGEN SATURATION: 91 % | BODY MASS INDEX: 26.98 KG/M2 | HEART RATE: 64 BPM | HEIGHT: 64 IN | DIASTOLIC BLOOD PRESSURE: 62 MMHG | TEMPERATURE: 98.4 F | SYSTOLIC BLOOD PRESSURE: 108 MMHG | RESPIRATION RATE: 18 BRPM | WEIGHT: 158 LBS

## 2021-09-28 PROCEDURE — NC001 PR NO CHARGE: Performed by: ORTHOPAEDIC SURGERY

## 2021-09-28 RX ORDER — AMOXICILLIN AND CLAVULANATE POTASSIUM 875; 125 MG/1; MG/1
1 TABLET, FILM COATED ORAL EVERY 12 HOURS SCHEDULED
Qty: 20 TABLET | Refills: 0 | Status: SHIPPED | OUTPATIENT
Start: 2021-09-28 | End: 2021-10-08

## 2021-09-28 RX ADMIN — TOPIRAMATE 100 MG: 100 TABLET, FILM COATED ORAL at 08:50

## 2021-09-28 RX ADMIN — AMPICILLIN SODIUM AND SULBACTAM SODIUM 3 G: 2; 1 INJECTION, POWDER, FOR SOLUTION INTRAMUSCULAR; INTRAVENOUS at 06:41

## 2021-09-28 RX ADMIN — OXYCODONE HYDROCHLORIDE 10 MG: 10 TABLET ORAL at 01:53

## 2021-09-28 RX ADMIN — OXYCODONE HYDROCHLORIDE 5 MG: 5 TABLET ORAL at 08:48

## 2021-09-28 RX ADMIN — AMPICILLIN SODIUM AND SULBACTAM SODIUM 3 G: 2; 1 INJECTION, POWDER, FOR SOLUTION INTRAMUSCULAR; INTRAVENOUS at 01:53

## 2021-09-28 RX ADMIN — PANTOPRAZOLE SODIUM 40 MG: 40 TABLET, DELAYED RELEASE ORAL at 06:38

## 2021-09-28 RX ADMIN — ACETAMINOPHEN 650 MG: 325 TABLET ORAL at 08:48

## 2021-09-28 RX ADMIN — HYDROMORPHONE HYDROCHLORIDE 0.5 MG: 1 INJECTION, SOLUTION INTRAMUSCULAR; INTRAVENOUS; SUBCUTANEOUS at 09:57

## 2021-09-28 RX ADMIN — HEPARIN SODIUM 5000 UNITS: 5000 INJECTION INTRAVENOUS; SUBCUTANEOUS at 06:38

## 2021-09-28 RX ADMIN — OXYCODONE HYDROCHLORIDE 10 MG: 10 TABLET ORAL at 06:38

## 2021-09-28 RX ADMIN — NICOTINE 1 PATCH: 7 PATCH, EXTENDED RELEASE TRANSDERMAL at 08:48

## 2021-09-28 RX ADMIN — PAROXETINE HYDROCHLORIDE 40 MG: 20 TABLET, FILM COATED ORAL at 08:49

## 2021-09-29 ENCOUNTER — TRANSITIONAL CARE MANAGEMENT (OUTPATIENT)
Dept: FAMILY MEDICINE CLINIC | Facility: CLINIC | Age: 46
End: 2021-09-29

## 2021-09-29 ENCOUNTER — TELEPHONE (OUTPATIENT)
Dept: OBGYN CLINIC | Facility: HOSPITAL | Age: 46
End: 2021-09-29

## 2021-09-29 DIAGNOSIS — R19.7 DIARRHEA, UNSPECIFIED TYPE: ICD-10-CM

## 2021-09-29 DIAGNOSIS — R10.84 GENERALIZED ABDOMINAL PAIN: ICD-10-CM

## 2021-09-29 DIAGNOSIS — K21.9 GASTROESOPHAGEAL REFLUX DISEASE, UNSPECIFIED WHETHER ESOPHAGITIS PRESENT: ICD-10-CM

## 2021-09-29 RX ORDER — PANTOPRAZOLE SODIUM 40 MG/1
TABLET, DELAYED RELEASE ORAL
Qty: 60 TABLET | Refills: 3 | Status: SHIPPED | OUTPATIENT
Start: 2021-09-29 | End: 2021-10-28 | Stop reason: HOSPADM

## 2021-09-29 NOTE — TELEPHONE ENCOUNTER
PCM call placed  Patient's number is not in service and 's voicemail is not set up  Will try 's number later

## 2021-09-30 ENCOUNTER — TELEPHONE (OUTPATIENT)
Dept: FAMILY MEDICINE CLINIC | Facility: CLINIC | Age: 46
End: 2021-09-30

## 2021-09-30 NOTE — TELEPHONE ENCOUNTER
I spoke with patient and her number is wrong in the chart  Correction made  Patient is stating her finger has not improved it is the same  She is doing the soaks in antibacterial soap and water and betadine 3 x per day, covering with gauze dressing  She is on augmentin, and taking ibuprofen 600mg but is having upset stomach  Advised she should be taking the medications with food  Can add tylenol 1000mg TID  She needs a follow up appt and msg to practice admin will be sent for force on  She has sent pics thru office email to show Dr how the finger looks

## 2021-10-01 DIAGNOSIS — G89.29 CHRONIC LOW BACK PAIN WITHOUT SCIATICA, UNSPECIFIED BACK PAIN LATERALITY: ICD-10-CM

## 2021-10-01 DIAGNOSIS — M54.50 CHRONIC LOW BACK PAIN WITHOUT SCIATICA, UNSPECIFIED BACK PAIN LATERALITY: ICD-10-CM

## 2021-10-01 DIAGNOSIS — L08.9 FINGER INFECTION: Primary | ICD-10-CM

## 2021-10-01 LAB
BACTERIA BLD CULT: NORMAL
BACTERIA BLD CULT: NORMAL

## 2021-10-01 RX ORDER — DOXYCYCLINE 100 MG/1
100 TABLET ORAL 2 TIMES DAILY
Qty: 14 TABLET | Refills: 0 | Status: SHIPPED | OUTPATIENT
Start: 2021-10-01 | End: 2021-10-08

## 2021-10-01 RX ORDER — IBUPROFEN 400 MG/1
400 TABLET ORAL EVERY 6 HOURS PRN
Qty: 40 TABLET | Refills: 1 | Status: SHIPPED | OUTPATIENT
Start: 2021-10-01 | End: 2021-10-28 | Stop reason: HOSPADM

## 2021-10-01 NOTE — TELEPHONE ENCOUNTER
I spoke with patient and information above relayed  She has developed a yeast infection from her current abx  She is wondering if we can provide an RX for treatment of this?

## 2021-10-01 NOTE — TELEPHONE ENCOUNTER
I reviewed imaging with Dr Cindy Macdonald  He would like me to add on another antibiotic, but this can wait until Monday   Please continue with current wound care protocols

## 2021-10-04 NOTE — TELEPHONE ENCOUNTER
Left msg for patient to call office  Will advise she should reach out to PCP for yeast infection treatment

## 2021-10-06 ENCOUNTER — TELEPHONE (OUTPATIENT)
Dept: OBGYN CLINIC | Facility: HOSPITAL | Age: 46
End: 2021-10-06

## 2021-10-07 ENCOUNTER — OFFICE VISIT (OUTPATIENT)
Dept: FAMILY MEDICINE CLINIC | Facility: CLINIC | Age: 46
End: 2021-10-07
Payer: COMMERCIAL

## 2021-10-07 VITALS
WEIGHT: 166 LBS | HEART RATE: 76 BPM | SYSTOLIC BLOOD PRESSURE: 142 MMHG | HEIGHT: 64 IN | BODY MASS INDEX: 28.34 KG/M2 | DIASTOLIC BLOOD PRESSURE: 90 MMHG | RESPIRATION RATE: 16 BRPM | TEMPERATURE: 98.6 F | OXYGEN SATURATION: 96 %

## 2021-10-07 DIAGNOSIS — Z12.31 SCREENING MAMMOGRAM FOR HIGH-RISK PATIENT: ICD-10-CM

## 2021-10-07 DIAGNOSIS — I10 ESSENTIAL HYPERTENSION: ICD-10-CM

## 2021-10-07 DIAGNOSIS — K21.9 GASTROESOPHAGEAL REFLUX DISEASE WITHOUT ESOPHAGITIS: ICD-10-CM

## 2021-10-07 DIAGNOSIS — L08.9 FINGER INFECTION: Primary | ICD-10-CM

## 2021-10-07 DIAGNOSIS — Z11.4 SCREENING FOR HIV (HUMAN IMMUNODEFICIENCY VIRUS): ICD-10-CM

## 2021-10-07 DIAGNOSIS — Z11.59 NEED FOR HEPATITIS C SCREENING TEST: ICD-10-CM

## 2021-10-07 DIAGNOSIS — Z72.0 TOBACCO ABUSE: ICD-10-CM

## 2021-10-07 DIAGNOSIS — R73.9 HYPERGLYCEMIA: ICD-10-CM

## 2021-10-07 DIAGNOSIS — B37.9 YEAST INFECTION: ICD-10-CM

## 2021-10-07 DIAGNOSIS — E78.2 MIXED HYPERLIPIDEMIA: ICD-10-CM

## 2021-10-07 PROCEDURE — 99215 OFFICE O/P EST HI 40 MIN: CPT | Performed by: FAMILY MEDICINE

## 2021-10-07 RX ORDER — FLUCONAZOLE 150 MG/1
150 TABLET ORAL ONCE
Qty: 1 TABLET | Refills: 0 | Status: SHIPPED | OUTPATIENT
Start: 2021-10-07 | End: 2021-10-07

## 2021-10-11 ENCOUNTER — TELEPHONE (OUTPATIENT)
Dept: FAMILY MEDICINE CLINIC | Facility: CLINIC | Age: 46
End: 2021-10-11

## 2021-10-11 DIAGNOSIS — B37.9 YEAST INFECTION: Primary | ICD-10-CM

## 2021-10-11 RX ORDER — FLUCONAZOLE 150 MG/1
150 TABLET ORAL ONCE
Qty: 1 TABLET | Refills: 0 | Status: SHIPPED | OUTPATIENT
Start: 2021-10-11 | End: 2021-10-11

## 2021-10-24 ENCOUNTER — APPOINTMENT (EMERGENCY)
Dept: CT IMAGING | Facility: HOSPITAL | Age: 46
DRG: 248 | End: 2021-10-24
Payer: COMMERCIAL

## 2021-10-24 ENCOUNTER — HOSPITAL ENCOUNTER (INPATIENT)
Facility: HOSPITAL | Age: 46
LOS: 4 days | Discharge: HOME | DRG: 248 | End: 2021-10-28
Attending: EMERGENCY MEDICINE | Admitting: FAMILY MEDICINE
Payer: COMMERCIAL

## 2021-10-24 DIAGNOSIS — R19.7 DIARRHEA: ICD-10-CM

## 2021-10-24 DIAGNOSIS — S30.0XXA TRAUMATIC HEMATOMA OF BUTTOCK: ICD-10-CM

## 2021-10-24 DIAGNOSIS — K21.9 GASTROESOPHAGEAL REFLUX DISEASE WITHOUT ESOPHAGITIS: ICD-10-CM

## 2021-10-24 DIAGNOSIS — R19.7 DIARRHEA, UNSPECIFIED TYPE: ICD-10-CM

## 2021-10-24 DIAGNOSIS — L02.91 ABSCESS: ICD-10-CM

## 2021-10-24 DIAGNOSIS — K52.9 COLITIS: Primary | ICD-10-CM

## 2021-10-24 DIAGNOSIS — R10.84 GENERALIZED ABDOMINAL PAIN: ICD-10-CM

## 2021-10-24 DIAGNOSIS — K21.9 GASTROESOPHAGEAL REFLUX DISEASE, UNSPECIFIED WHETHER ESOPHAGITIS PRESENT: ICD-10-CM

## 2021-10-24 LAB
ALBUMIN SERPL BCP-MCNC: 4.1 G/DL (ref 3.5–5)
ALP SERPL-CCNC: 112 U/L (ref 46–116)
ALT SERPL W P-5'-P-CCNC: 56 U/L (ref 12–78)
ANION GAP SERPL CALCULATED.3IONS-SCNC: 13 MMOL/L (ref 4–13)
AST SERPL W P-5'-P-CCNC: 35 U/L (ref 5–45)
BACTERIA UR QL AUTO: NORMAL /HPF
BASOPHILS # BLD AUTO: 0.04 THOUSANDS/ΜL (ref 0–0.1)
BASOPHILS NFR BLD AUTO: 0 % (ref 0–1)
BILIRUB SERPL-MCNC: 0.43 MG/DL (ref 0.2–1)
BILIRUB UR QL STRIP: NEGATIVE
BUN SERPL-MCNC: 15 MG/DL (ref 5–25)
CALCIUM SERPL-MCNC: 9.3 MG/DL (ref 8.3–10.1)
CHLORIDE SERPL-SCNC: 105 MMOL/L (ref 100–108)
CLARITY UR: CLEAR
CO2 SERPL-SCNC: 24 MMOL/L (ref 21–32)
COLOR UR: ABNORMAL
CREAT SERPL-MCNC: 0.8 MG/DL (ref 0.6–1.3)
EOSINOPHIL # BLD AUTO: 0.12 THOUSAND/ΜL (ref 0–0.61)
EOSINOPHIL NFR BLD AUTO: 1 % (ref 0–6)
ERYTHROCYTE [DISTWIDTH] IN BLOOD BY AUTOMATED COUNT: 12.5 % (ref 11.6–15.1)
GFR SERPL CREATININE-BSD FRML MDRD: 87 ML/MIN/1.73SQ M
GLUCOSE SERPL-MCNC: 94 MG/DL (ref 65–140)
GLUCOSE UR STRIP-MCNC: NEGATIVE MG/DL
HCT VFR BLD AUTO: 41.5 % (ref 34.8–46.1)
HGB BLD-MCNC: 14.1 G/DL (ref 11.5–15.4)
HGB UR QL STRIP.AUTO: ABNORMAL
IMM GRANULOCYTES # BLD AUTO: 0.04 THOUSAND/UL (ref 0–0.2)
IMM GRANULOCYTES NFR BLD AUTO: 0 % (ref 0–2)
KETONES UR STRIP-MCNC: ABNORMAL MG/DL
LACTATE SERPL-SCNC: 0.9 MMOL/L (ref 0.5–2)
LEUKOCYTE ESTERASE UR QL STRIP: NEGATIVE
LIPASE SERPL-CCNC: 62 U/L (ref 73–393)
LYMPHOCYTES # BLD AUTO: 1.55 THOUSANDS/ΜL (ref 0.6–4.47)
LYMPHOCYTES NFR BLD AUTO: 17 % (ref 14–44)
MCH RBC QN AUTO: 29.4 PG (ref 26.8–34.3)
MCHC RBC AUTO-ENTMCNC: 34 G/DL (ref 31.4–37.4)
MCV RBC AUTO: 87 FL (ref 82–98)
MONOCYTES # BLD AUTO: 0.9 THOUSAND/ΜL (ref 0.17–1.22)
MONOCYTES NFR BLD AUTO: 10 % (ref 4–12)
NEUTROPHILS # BLD AUTO: 6.71 THOUSANDS/ΜL (ref 1.85–7.62)
NEUTS SEG NFR BLD AUTO: 72 % (ref 43–75)
NITRITE UR QL STRIP: NEGATIVE
NON-SQ EPI CELLS URNS QL MICRO: NORMAL /HPF
NRBC BLD AUTO-RTO: 0 /100 WBCS
PH UR STRIP.AUTO: 5 [PH]
PLATELET # BLD AUTO: 241 THOUSANDS/UL (ref 149–390)
PMV BLD AUTO: 9.7 FL (ref 8.9–12.7)
POTASSIUM SERPL-SCNC: 3.6 MMOL/L (ref 3.5–5.3)
PROT SERPL-MCNC: 8 G/DL (ref 6.4–8.2)
PROT UR STRIP-MCNC: NEGATIVE MG/DL
RBC # BLD AUTO: 4.8 MILLION/UL (ref 3.81–5.12)
RBC #/AREA URNS AUTO: NORMAL /HPF
SODIUM SERPL-SCNC: 142 MMOL/L (ref 136–145)
SP GR UR STRIP.AUTO: <=1.005 (ref 1–1.03)
URATE CRY URNS QL MICRO: NORMAL /HPF
UROBILINOGEN UR QL STRIP.AUTO: 0.2 E.U./DL
WBC # BLD AUTO: 9.36 THOUSAND/UL (ref 4.31–10.16)
WBC #/AREA URNS AUTO: NORMAL /HPF

## 2021-10-24 PROCEDURE — 83605 ASSAY OF LACTIC ACID: CPT | Performed by: EMERGENCY MEDICINE

## 2021-10-24 PROCEDURE — 87209 SMEAR COMPLEX STAIN: CPT | Performed by: EMERGENCY MEDICINE

## 2021-10-24 PROCEDURE — 87177 OVA AND PARASITES SMEARS: CPT | Performed by: EMERGENCY MEDICINE

## 2021-10-24 PROCEDURE — 74177 CT ABD & PELVIS W/CONTRAST: CPT

## 2021-10-24 PROCEDURE — 36415 COLL VENOUS BLD VENIPUNCTURE: CPT | Performed by: EMERGENCY MEDICINE

## 2021-10-24 PROCEDURE — 87505 NFCT AGENT DETECTION GI: CPT | Performed by: EMERGENCY MEDICINE

## 2021-10-24 PROCEDURE — 85025 COMPLETE CBC W/AUTO DIFF WBC: CPT | Performed by: EMERGENCY MEDICINE

## 2021-10-24 PROCEDURE — 83690 ASSAY OF LIPASE: CPT | Performed by: EMERGENCY MEDICINE

## 2021-10-24 PROCEDURE — 81001 URINALYSIS AUTO W/SCOPE: CPT | Performed by: EMERGENCY MEDICINE

## 2021-10-24 PROCEDURE — 99285 EMERGENCY DEPT VISIT HI MDM: CPT

## 2021-10-24 PROCEDURE — 96361 HYDRATE IV INFUSION ADD-ON: CPT

## 2021-10-24 PROCEDURE — G1004 CDSM NDSC: HCPCS

## 2021-10-24 PROCEDURE — 99223 1ST HOSP IP/OBS HIGH 75: CPT | Performed by: INTERNAL MEDICINE

## 2021-10-24 PROCEDURE — 96374 THER/PROPH/DIAG INJ IV PUSH: CPT

## 2021-10-24 PROCEDURE — 87493 C DIFF AMPLIFIED PROBE: CPT | Performed by: EMERGENCY MEDICINE

## 2021-10-24 PROCEDURE — 80053 COMPREHEN METABOLIC PANEL: CPT | Performed by: EMERGENCY MEDICINE

## 2021-10-24 PROCEDURE — 99284 EMERGENCY DEPT VISIT MOD MDM: CPT | Performed by: EMERGENCY MEDICINE

## 2021-10-24 RX ORDER — PANTOPRAZOLE SODIUM 40 MG/1
40 TABLET, DELAYED RELEASE ORAL 2 TIMES DAILY
Status: DISCONTINUED | OUTPATIENT
Start: 2021-10-24 | End: 2021-10-28 | Stop reason: HOSPADM

## 2021-10-24 RX ORDER — MONTELUKAST SODIUM 4 MG/1
1 TABLET, CHEWABLE ORAL 2 TIMES DAILY
Status: DISCONTINUED | OUTPATIENT
Start: 2021-10-24 | End: 2021-10-25

## 2021-10-24 RX ORDER — ACETAMINOPHEN 325 MG/1
650 TABLET ORAL EVERY 6 HOURS PRN
Status: DISCONTINUED | OUTPATIENT
Start: 2021-10-24 | End: 2021-10-28 | Stop reason: HOSPADM

## 2021-10-24 RX ORDER — PAROXETINE HYDROCHLORIDE 20 MG/1
40 TABLET, FILM COATED ORAL EVERY MORNING
Status: DISCONTINUED | OUTPATIENT
Start: 2021-10-25 | End: 2021-10-28 | Stop reason: HOSPADM

## 2021-10-24 RX ORDER — BUSPIRONE HYDROCHLORIDE 5 MG/1
10 TABLET ORAL 2 TIMES DAILY PRN
Status: DISCONTINUED | OUTPATIENT
Start: 2021-10-24 | End: 2021-10-28 | Stop reason: HOSPADM

## 2021-10-24 RX ORDER — TOPIRAMATE 100 MG/1
100 TABLET, FILM COATED ORAL 2 TIMES DAILY
Status: DISCONTINUED | OUTPATIENT
Start: 2021-10-24 | End: 2021-10-28 | Stop reason: HOSPADM

## 2021-10-24 RX ORDER — KETOROLAC TROMETHAMINE 30 MG/ML
15 INJECTION, SOLUTION INTRAMUSCULAR; INTRAVENOUS ONCE
Status: COMPLETED | OUTPATIENT
Start: 2021-10-24 | End: 2021-10-24

## 2021-10-24 RX ORDER — LOSARTAN POTASSIUM 50 MG/1
100 TABLET ORAL DAILY
Status: DISCONTINUED | OUTPATIENT
Start: 2021-10-25 | End: 2021-10-28 | Stop reason: HOSPADM

## 2021-10-24 RX ORDER — DICYCLOMINE HCL 20 MG
20 TABLET ORAL ONCE
Status: COMPLETED | OUTPATIENT
Start: 2021-10-24 | End: 2021-10-24

## 2021-10-24 RX ORDER — SODIUM CHLORIDE 9 MG/ML
100 INJECTION, SOLUTION INTRAVENOUS CONTINUOUS
Status: DISCONTINUED | OUTPATIENT
Start: 2021-10-24 | End: 2021-10-26

## 2021-10-24 RX ORDER — DICYCLOMINE HCL 20 MG
20 TABLET ORAL EVERY 6 HOURS PRN
Status: DISCONTINUED | OUTPATIENT
Start: 2021-10-24 | End: 2021-10-27

## 2021-10-24 RX ORDER — QUETIAPINE FUMARATE 100 MG/1
300 TABLET, FILM COATED ORAL
Status: DISCONTINUED | OUTPATIENT
Start: 2021-10-24 | End: 2021-10-28 | Stop reason: HOSPADM

## 2021-10-24 RX ORDER — NICOTINE 21 MG/24HR
1 PATCH, TRANSDERMAL 24 HOURS TRANSDERMAL DAILY
Status: DISCONTINUED | OUTPATIENT
Start: 2021-10-25 | End: 2021-10-28 | Stop reason: HOSPADM

## 2021-10-24 RX ORDER — FUROSEMIDE 40 MG/1
40 TABLET ORAL DAILY PRN
Status: DISCONTINUED | OUTPATIENT
Start: 2021-10-24 | End: 2021-10-28 | Stop reason: HOSPADM

## 2021-10-24 RX ADMIN — SODIUM CHLORIDE 1000 ML: 0.9 INJECTION, SOLUTION INTRAVENOUS at 18:05

## 2021-10-24 RX ADMIN — IOHEXOL 100 ML: 350 INJECTION, SOLUTION INTRAVENOUS at 18:40

## 2021-10-24 RX ADMIN — KETOROLAC TROMETHAMINE 15 MG: 30 INJECTION, SOLUTION INTRAMUSCULAR at 19:38

## 2021-10-24 RX ADMIN — SODIUM CHLORIDE 100 ML/HR: 0.9 INJECTION, SOLUTION INTRAVENOUS at 21:47

## 2021-10-24 RX ADMIN — QUETIAPINE FUMARATE 200 MG: 200 TABLET ORAL at 21:52

## 2021-10-24 RX ADMIN — PANTOPRAZOLE SODIUM 40 MG: 40 TABLET, DELAYED RELEASE ORAL at 21:56

## 2021-10-24 RX ADMIN — TOPIRAMATE 100 MG: 100 TABLET, FILM COATED ORAL at 21:53

## 2021-10-24 RX ADMIN — DICYCLOMINE HYDROCHLORIDE 20 MG: 20 TABLET ORAL at 19:18

## 2021-10-24 NOTE — LETTER
[address] 3[address] FLOOR MED SURG UNIT  [address] [facility]  [address] [address]  Dept: [phone]    November 1, 2021     Patient: Amaury Ruano   YOB: 1975   Date of Visit: 10/24/2021       To Whom it May Concern:    Amaury Ruano is under my professional care  She was seen in the hospital from 10/24/2021   to 10/28/2021  She is advised rest till 11/1/2021  If you have any questions or concerns, please don't hesitate to call           Sincerely,          Yara Worthy MD

## 2021-10-24 NOTE — LETTER
[address] 3[address] FLOOR MED SURG UNIT  [address]  [address] [place] [address]  Dept: [phone]    October 28, 2021     Patient: Shoshana Branham   YOB: 1975   Date of Visit: 10/24/2021       To Whom it May Concern:    Shoshana Branham is under my professional care  She was seen in the hospital from 10/24/2021   to 10/28/21  She is advised rest till 11/1/2021  If you have any questions or concerns, please don't hesitate to call           Sincerely,          Harrold Buerger, MD

## 2021-10-25 DIAGNOSIS — K21.9 GASTROESOPHAGEAL REFLUX DISEASE, UNSPECIFIED WHETHER ESOPHAGITIS PRESENT: ICD-10-CM

## 2021-10-25 DIAGNOSIS — R19.7 DIARRHEA, UNSPECIFIED TYPE: ICD-10-CM

## 2021-10-25 DIAGNOSIS — R10.84 GENERALIZED ABDOMINAL PAIN: ICD-10-CM

## 2021-10-25 PROBLEM — T14.8XXA HEMATOMA: Status: ACTIVE | Noted: 2021-10-24

## 2021-10-25 LAB
ANION GAP SERPL CALCULATED.3IONS-SCNC: 9 MMOL/L (ref 4–13)
BASOPHILS # BLD AUTO: 0.04 THOUSANDS/ΜL (ref 0–0.1)
BASOPHILS NFR BLD AUTO: 1 % (ref 0–1)
BUN SERPL-MCNC: 14 MG/DL (ref 5–25)
C DIFF TOX A+B STL QL IA: POSITIVE
C DIFF TOX GENS STL QL NAA+PROBE: POSITIVE
CALCIUM SERPL-MCNC: 8.4 MG/DL (ref 8.3–10.1)
CAMPYLOBACTER DNA SPEC NAA+PROBE: NORMAL
CHLORIDE SERPL-SCNC: 112 MMOL/L (ref 100–108)
CO2 SERPL-SCNC: 25 MMOL/L (ref 21–32)
CREAT SERPL-MCNC: 0.77 MG/DL (ref 0.6–1.3)
CRP SERPL QL: 19.8 MG/L
EOSINOPHIL # BLD AUTO: 0.27 THOUSAND/ΜL (ref 0–0.61)
EOSINOPHIL NFR BLD AUTO: 5 % (ref 0–6)
ERYTHROCYTE [DISTWIDTH] IN BLOOD BY AUTOMATED COUNT: 12.6 % (ref 11.6–15.1)
GFR SERPL CREATININE-BSD FRML MDRD: 92 ML/MIN/1.73SQ M
GLUCOSE SERPL-MCNC: 100 MG/DL (ref 65–140)
HCT VFR BLD AUTO: 35.3 % (ref 34.8–46.1)
HGB BLD-MCNC: 12 G/DL (ref 11.5–15.4)
IMM GRANULOCYTES # BLD AUTO: 0.01 THOUSAND/UL (ref 0–0.2)
IMM GRANULOCYTES NFR BLD AUTO: 0 % (ref 0–2)
LYMPHOCYTES # BLD AUTO: 1.61 THOUSANDS/ΜL (ref 0.6–4.47)
LYMPHOCYTES NFR BLD AUTO: 28 % (ref 14–44)
MCH RBC QN AUTO: 29.8 PG (ref 26.8–34.3)
MCHC RBC AUTO-ENTMCNC: 34 G/DL (ref 31.4–37.4)
MCV RBC AUTO: 88 FL (ref 82–98)
MONOCYTES # BLD AUTO: 0.81 THOUSAND/ΜL (ref 0.17–1.22)
MONOCYTES NFR BLD AUTO: 14 % (ref 4–12)
NEUTROPHILS # BLD AUTO: 3.05 THOUSANDS/ΜL (ref 1.85–7.62)
NEUTS SEG NFR BLD AUTO: 52 % (ref 43–75)
NRBC BLD AUTO-RTO: 0 /100 WBCS
PLATELET # BLD AUTO: 177 THOUSANDS/UL (ref 149–390)
PMV BLD AUTO: 9.6 FL (ref 8.9–12.7)
POTASSIUM SERPL-SCNC: 3.1 MMOL/L (ref 3.5–5.3)
RBC # BLD AUTO: 4.03 MILLION/UL (ref 3.81–5.12)
SALMONELLA DNA SPEC QL NAA+PROBE: NORMAL
SHIGA TOXIN STX GENE SPEC NAA+PROBE: NORMAL
SHIGELLA DNA SPEC QL NAA+PROBE: NORMAL
SODIUM SERPL-SCNC: 146 MMOL/L (ref 136–145)
WBC # BLD AUTO: 5.79 THOUSAND/UL (ref 4.31–10.16)

## 2021-10-25 PROCEDURE — 80048 BASIC METABOLIC PNL TOTAL CA: CPT | Performed by: INTERNAL MEDICINE

## 2021-10-25 PROCEDURE — 82656 EL-1 FECAL QUAL/SEMIQ: CPT | Performed by: PHYSICIAN ASSISTANT

## 2021-10-25 PROCEDURE — 83993 ASSAY FOR CALPROTECTIN FECAL: CPT | Performed by: PHYSICIAN ASSISTANT

## 2021-10-25 PROCEDURE — 99254 IP/OBS CNSLTJ NEW/EST MOD 60: CPT | Performed by: PHYSICIAN ASSISTANT

## 2021-10-25 PROCEDURE — 99254 IP/OBS CNSLTJ NEW/EST MOD 60: CPT | Performed by: INTERNAL MEDICINE

## 2021-10-25 PROCEDURE — 85025 COMPLETE CBC W/AUTO DIFF WBC: CPT | Performed by: INTERNAL MEDICINE

## 2021-10-25 PROCEDURE — 99233 SBSQ HOSP IP/OBS HIGH 50: CPT | Performed by: INTERNAL MEDICINE

## 2021-10-25 PROCEDURE — 87505 NFCT AGENT DETECTION GI: CPT | Performed by: INTERNAL MEDICINE

## 2021-10-25 PROCEDURE — 86140 C-REACTIVE PROTEIN: CPT | Performed by: PHYSICIAN ASSISTANT

## 2021-10-25 PROCEDURE — 36415 COLL VENOUS BLD VENIPUNCTURE: CPT | Performed by: INTERNAL MEDICINE

## 2021-10-25 RX ORDER — POTASSIUM CHLORIDE 20 MEQ/1
40 TABLET, EXTENDED RELEASE ORAL 2 TIMES DAILY
Status: COMPLETED | OUTPATIENT
Start: 2021-10-25 | End: 2021-10-25

## 2021-10-25 RX ORDER — MONTELUKAST SODIUM 4 MG/1
TABLET, CHEWABLE ORAL
Qty: 60 TABLET | Refills: 3 | Status: SHIPPED | OUTPATIENT
Start: 2021-10-25 | End: 2021-10-28 | Stop reason: HOSPADM

## 2021-10-25 RX ORDER — POTASSIUM CHLORIDE 20 MEQ/1
40 TABLET, EXTENDED RELEASE ORAL ONCE
Status: DISCONTINUED | OUTPATIENT
Start: 2021-10-25 | End: 2021-10-25

## 2021-10-25 RX ADMIN — POTASSIUM CHLORIDE 40 MEQ: 1500 TABLET, EXTENDED RELEASE ORAL at 18:31

## 2021-10-25 RX ADMIN — QUETIAPINE FUMARATE 300 MG: 200 TABLET ORAL at 21:59

## 2021-10-25 RX ADMIN — Medication 125 MG: at 15:00

## 2021-10-25 RX ADMIN — TOPIRAMATE 100 MG: 100 TABLET, FILM COATED ORAL at 09:48

## 2021-10-25 RX ADMIN — PANTOPRAZOLE SODIUM 40 MG: 40 TABLET, DELAYED RELEASE ORAL at 15:42

## 2021-10-25 RX ADMIN — DICYCLOMINE HYDROCHLORIDE 20 MG: 20 TABLET ORAL at 13:51

## 2021-10-25 RX ADMIN — POTASSIUM CHLORIDE 40 MEQ: 1500 TABLET, EXTENDED RELEASE ORAL at 09:55

## 2021-10-25 RX ADMIN — PAROXETINE 40 MG: 20 TABLET, FILM COATED ORAL at 09:47

## 2021-10-25 RX ADMIN — PANTOPRAZOLE SODIUM 40 MG: 40 TABLET, DELAYED RELEASE ORAL at 08:00

## 2021-10-25 RX ADMIN — LOSARTAN POTASSIUM 100 MG: 50 TABLET, FILM COATED ORAL at 09:47

## 2021-10-25 RX ADMIN — TOPIRAMATE 100 MG: 100 TABLET, FILM COATED ORAL at 18:31

## 2021-10-25 RX ADMIN — Medication 125 MG: at 20:25

## 2021-10-25 NOTE — PROGRESS NOTES
[address] [facility]  Progress Note - [address] [facility] 1975, 48 y o  female MRN: [id number]  Unit/Bed#: ED 10 Encounter: [id number]  Primary Care Provider: Elmer Randolph MD   Date and time admitted to hospital: 10/24/2021  4:29 PM    * Colitis  Assessment & Plan  Has been having significant diarrhea approximately 20-30 bowel movements per day for the past 2-3 weeks  Reports weight loss  Reports mucous and blood in stool yesterday  Had similar symptoms years ago  She was supposed to get a colonoscopy with GI however did not go for procedure  CT scan revealed mild colitis    -- Will check C diff  -- stool viral/ bacterial panel  -- continue on IVF   -- GI consult , plan for colonoscopy tomorrow  --  bowel prep today  -- follow calprotectin, CRP  -- patient is on clear liquid    Hematoma  Assessment & Plan  CT scan revealed 6 1 cm fluid collection suggesting abscess  However on physical examination appears to be hematoma  WBC count within normal limits  Currently afebrile    -- Will monitor off antibiotics for now  -- Emergency department discussed with surgery who will see in consultation        Mixed hyperlipidemia  Assessment & Plan  Continue colestipol    Essential hypertension  Assessment & Plan  Continue home ARB  BP currently controlled    Bipolar 1 disorder (HCC)  Assessment & Plan  Continue home Seroquel, topiramate    Gastroesophageal reflux disease without esophagitis  Assessment & Plan  Continue home Protonix      VTE Pharmacologic Prophylaxis:   VTE Score: 2 Low Risk (Score 0-2) - Encourage Ambulation  Mechanical VTE Prophylaxis in Place: Yes    Patient Centered Rounds: I have performed bedside rounds with nursing staff today  Discussions with Specialists or Other Care Team Provider:  GI    Education and Discussions with Family / Patient: Updated  () via phone    Plan    Current Length of Stay: 1 day(s)    Current Patient Status: Inpatient     Discharge Plan / Estimated Discharge Date: Anticipate discharge tomorrow to home  Code Status: Level 1 - Full Code      Subjective:   Patient was seen and examined at bedside  She reports she still has frequent bowel movements and is sometimes incontinent to bowels  She has mild bilateral lower abdominal pain not radiating  Patient reports she had mucosa in blood in her stool yesterday  She tells me she 2 lost about 7-8 lb over the last month when she had diarrhea  Patient denies nausea/vomiting, chest pain, shortness of breath, diaphoresis, rashes anywhere  Patient reports she slipped on her porch few weeks ago and had a large bruise on the right gluteal area  Currently bruise has much subsided  Patient denies alcohol drinking, IV drug use  She reports she had HIV test done as outpatient which was negative  Objective:     Vitals:   Temp (24hrs), Av 5 °F (36 9 °C), Min:98 5 °F (36 9 °C), Max:98 5 °F (36 9 °C)    Temp:  [98 5 °F (36 9 °C)] 98 5 °F (36 9 °C)  HR:  [57-90] 57  Resp:  [16-18] 16  BP: (124-146)/(65-94) 124/65  SpO2:  [93 %-97 %] 93 %  Body mass index is 28 01 kg/m²  Input and Output Summary (last 24 hours): Intake/Output Summary (Last 24 hours) at 10/25/2021 0381  Last data filed at 10/24/2021 2005  Gross per 24 hour   Intake 1000 ml   Output --   Net 1000 ml       Physical Exam:     Physical Exam  Vitals and nursing note reviewed  Constitutional:       General: She is not in acute distress  Appearance: She is well-developed  She is not ill-appearing or toxic-appearing  HENT:      Head: Normocephalic and atraumatic  Eyes:      Conjunctiva/sclera: Conjunctivae normal    Cardiovascular:      Rate and Rhythm: Normal rate and regular rhythm  Heart sounds: No murmur heard  Pulmonary:      Effort: Pulmonary effort is normal  No respiratory distress  Breath sounds: Normal breath sounds  Abdominal:      Palpations: Abdomen is soft  Tenderness:  There is no abdominal tenderness  Musculoskeletal:      Cervical back: Neck supple  Skin:     General: Skin is warm and dry  Neurological:      Mental Status: She is alert           Additional Data:     Labs:  Results from last 7 days   Lab Units 10/25/21  0531   WBC Thousand/uL 5 79   HEMOGLOBIN g/dL 12 0   HEMATOCRIT % 35 3   PLATELETS Thousands/uL 177   NEUTROS PCT % 52   LYMPHS PCT % 28   MONOS PCT % 14*   EOS PCT % 5     Results from last 7 days   Lab Units 10/25/21  0531 10/24/21  1802   SODIUM mmol/L 146* 142   POTASSIUM mmol/L 3 1* 3 6   CHLORIDE mmol/L 112* 105   CO2 mmol/L 25 24   BUN mg/dL 14 15   CREATININE mg/dL 0 77 0 80   ANION GAP mmol/L 9 13   CALCIUM mg/dL 8 4 9 3   ALBUMIN g/dL  --  4 1   TOTAL BILIRUBIN mg/dL  --  0 43   ALK PHOS U/L  --  112   ALT U/L  --  56   AST U/L  --  35   GLUCOSE RANDOM mg/dL 100 94                 Results from last 7 days   Lab Units 10/24/21  1802   LACTIC ACID mmol/L 0 9       Imaging: Reviewed radiology reports from this admission including: abdominal/pelvic CT scan    Recent Cultures (last 7 days):           Lines/Drains:  Invasive Devices     Peripheral Intravenous Line            Peripheral IV 10/24/21 Right Antecubital <1 day                Telemetry:        Last 24 Hours Medication List:   Current Facility-Administered Medications   Medication Dose Route Frequency Provider Last Rate    acetaminophen  650 mg Oral Q6H PRN Haroon Raymond MD      busPIRone  10 mg Oral BID PRN Haroon Raymond MD      colestipol  1 g Oral BID Haroon Raymond MD      dicyclomine  20 mg Oral Q6H PRN Haroon Raymond MD      furosemide  40 mg Oral Daily PRN Haroon Raymond MD      losartan  100 mg Oral Daily Haroon Raymond MD      nicotine  1 patch Transdermal Daily Haroon Raymond MD      pantoprazole  40 mg Oral BID Haroon Raymond MD      PARoxetine  40 mg Oral QAM Haroon Raymond MD      potassium chloride  40 mEq Oral BID Gay Nava MD      QUEtiapine  300 mg Oral HS Haroon Raymond MD      sodium chloride  100 mL/hr Intravenous Continuous Haroon Raymond  mL/hr (10/24/21 2147)    topiramate  100 mg Oral BID Kristen Pete MD          Today, Patient Was Seen By: Timothy Wiley MD    ** Please Note: This note has been constructed using a voice recognition system   **

## 2021-10-25 NOTE — CONSULTS
Consultation - 126 [facility]  Anton Toscano 48 y o  female MRN: [id number]  Unit/Bed#: ED 10 Encounter: [id number]        Consults    Reason for Consult / Principal Problem: Acute Diarrhea    HPI: Aleksandr Garg is a 47 yo F with a PMH of bipolar disorder, anxiety, GERD, who presented yesterday with significant diarrhea 20-30 times daily associated with mild lower abdominal cramping and tenesmus over the past 3 weeks  She reports she has dealt with intermittent diarrhea issues in the past but nothing to this severity  She is passing some blood and mucous in her stools at times  She was given antibiotics about 1 month ago for an abscess on her finger related to a cat bite and then her symptoms started following this  She was seen by our office in May for GERD symptoms and occasional diarrhea and recommended to have a EGD/colonoscopy which was not done  She reports her GERD symptoms are mostly controlled on protonix now  She had a colonoscopy in the past which she reports was unremarkable      REVIEW OF SYSTEMS: Negative except for as stated above    Historical Information   Past Medical History:   Diagnosis Date    Anxiety     Bipolar 1 disorder ([facility] ) 2020    Bipolar 1 disorder ([facility] ) 2020    Bipolar disease, manic ([facility] )     Depression     Schizophrenia ([facility] )      Past Surgical History:   Procedure Laterality Date     SECTION      x2    HYSTERECTOMY      KNEE SURGERY Left     4 times    LEG SURGERY Right     thony placed then removed, broken femur    NEUROPLASTY / TRANSPOSITION MEDIAN NERVE AT CARPAL TUNNEL       Social History   Social History     Substance and Sexual Activity   Alcohol Use Not Currently     Social History     Substance and Sexual Activity   Drug Use Not Currently    Types: Methamphetamines     Social History     Tobacco Use   Smoking Status Current Every Day Smoker    Packs/day: 0 25    Types: Cigarettes   Smokeless Tobacco Never Used     Family History Family history unknown: Yes       Meds/Allergies     (Not in a hospital admission)    Current Facility-Administered Medications   Medication Dose Route Frequency    acetaminophen (TYLENOL) tablet 650 mg  650 mg Oral Q6H PRN    busPIRone (BUSPAR) tablet 10 mg  10 mg Oral BID PRN    colestipol (COLESTID) tablet 1 g  1 g Oral BID    dicyclomine (BENTYL) tablet 20 mg  20 mg Oral Q6H PRN    furosemide (LASIX) tablet 40 mg  40 mg Oral Daily PRN    losartan (COZAAR) tablet 100 mg  100 mg Oral Daily    nicotine (NICODERM CQ) 14 mg/24hr TD 24 hr patch 1 patch  1 patch Transdermal Daily    pantoprazole (PROTONIX) EC tablet 40 mg  40 mg Oral BID    PARoxetine (PAXIL) tablet 40 mg  40 mg Oral QAM    potassium chloride (K-DUR,KLOR-CON) CR tablet 40 mEq  40 mEq Oral BID    QUEtiapine (SEROquel) tablet 300 mg  300 mg Oral HS    sodium chloride 0 9 % infusion  100 mL/hr Intravenous Continuous    topiramate (TOPAMAX) tablet 100 mg  100 mg Oral BID       Allergies   Allergen Reactions    Ciprofloxacin Hives    Other Other (See Comments)     unknown  Other reaction(s): Other (See Comments)  unknown    Wound Dressing Adhesive Other (See Comments)     unknown    Atarax [Hydroxyzine] Palpitations           Objective     Blood pressure 124/65, pulse 57, temperature 98 5 °F (36 9 °C), temperature source Oral, resp  rate 16, height 5' 4" (1 626 m), weight 74 kg (163 lb 3 2 oz), SpO2 93 %  Intake/Output Summary (Last 24 hours) at 10/25/2021 1155  Last data filed at 10/24/2021 2005  Gross per 24 hour   Intake 1000 ml   Output --   Net 1000 ml         PHYSICAL EXAM:      General Appearance:   Alert, cooperative, no distress, appears stated age    HEENT:   Normocephalic, atraumatic, anicteric      Neck:  Supple, symmetrical, trachea midline   Lungs:   Clear to auscultation bilaterally; no rales, rhonchi or wheezing; respirations unlabored    Heart[de-identified]   S1 and S2 normal; regular rate and rhythm; no murmur, rub, or gallop  Abdomen:   Soft, non-tender, non-distended; normal bowel sounds; no masses, no organomegaly    Rectal:   Deferred    Extremities:  No cyanosis, clubbing or edema    Pulses:  2+ and symmetric all extremities    Skin:  Skin color, texture, turgor normal, no rashes or lesions      Lab Results:   Results from last 7 days   Lab Units 10/25/21  0531   WBC Thousand/uL 5 79   HEMOGLOBIN g/dL 12 0   HEMATOCRIT % 35 3   PLATELETS Thousands/uL 177   NEUTROS PCT % 52   LYMPHS PCT % 28   MONOS PCT % 14*   EOS PCT % 5     Results from last 7 days   Lab Units 10/25/21  0531 10/24/21  1802   POTASSIUM mmol/L 3 1* 3 6   CHLORIDE mmol/L 112* 105   CO2 mmol/L 25 24   BUN mg/dL 14 15   CREATININE mg/dL 0 77 0 80   CALCIUM mg/dL 8 4 9 3   ALK PHOS U/L  --  112   ALT U/L  --  56   AST U/L  --  35         Results from last 7 days   Lab Units 10/24/21  1802   LIPASE u/L 62*       Imaging Studies: I have personally reviewed pertinent imaging studies  CT abdomen pelvis with contrast  Result Date: 10/24/2021  Impression: 1  Liquid stool throughout the colon with mild sigmoid colonic wall thickening, suggesting the possibility of mild colitis  2   Approximately 6 1 cm fluid collection with thin rim enhancement in the right upper buttock subcutaneous fat, suggesting abscess  Possible tract to the right medial buttock skin  Recommend direct inspection  3  Hepatomegaly with severe hepatic steatosis  The study was marked in [facility] for immediate notification   Workstation performed: [id number]       ASSESSMENT and PLAN:      Acute Diarrhea  Sigmoid Colitis  - She presents with significant diarrhea 20-30 times daily for about 3 weeks associated with tenesmus, lower abdominal cramping, scant blood, and mucous after receiving antibiotics about 1 month ago for an abscess related to a cat bite  - High suspicion for Cdiff given abx use prior to the onset of symptoms though she doesn't have a leukocytosis; follow up Cdiff, enteric panel results  - If infectious stool studies are normal, recommend EGD/colonoscopy for further workup of symptoms  - Clear liquid diet for now  - Supportive care  - Serial abdominal exams  - Check CRP, fecal calprotectin   - She had an elevated ESR in May with a normal fecal lactoferrin and neg celiac disease ab panel  - Surgical consultation for the possible abscess noted on imaging      The patient will be seen and examined by Dr Elizabeth Mac

## 2021-10-25 NOTE — ASSESSMENT & PLAN NOTE
CT scan revealed 6 1 cm fluid collection suggesting abscess  However on physical examination appears to be hematoma  WBC count within normal limits  Currently afebrile    -- Will monitor off antibiotics for now  -- Emergency department discussed with surgery who will see in consultation

## 2021-10-25 NOTE — ASSESSMENT & PLAN NOTE
CT scan revealed 6 1 cm fluid collection suggesting abscess  However on physical examination appears to be hematoma  WBC count within normal limits  Currently afebrile  Will monitor off antibiotics for now  Emergency department discussed with surgery who will see in consultation

## 2021-10-25 NOTE — UTILIZATION REVIEW
Initial Clinical Review    Admission: Date/Time/Statement:   Admission Orders (From admission, onward)     Ordered        10/24/21 2036  Inpatient Admission  Once                   Orders Placed This Encounter   Procedures    Inpatient Admission     Standing Status:   Standing     Number of Occurrences:   1     Order Specific Question:   Level of Care     Answer:   Med Surg [16]     Order Specific Question:   Estimated length of stay     Answer:   More than 2 Midnights     Order Specific Question:   Certification     Answer:   I certify that inpatient services are medically necessary for this patient for a duration of greater than two midnights  See H&P and MD Progress Notes for additional information about the patient's course of treatment  ED Arrival Information     Expected Arrival Acuity    - 10/24/2021 15:00 Urgent         Means of arrival Escorted by Service Admission type    [facility] Urgent         Arrival complaint    diarrhea, not eating        Chief Complaint   Patient presents with    Diarrhea     pt states she needs to go to have BM like 20-30 times a day, pt states it got worse after she fell, pt was scheduled for colonoscopy but did not go for it  Initial Presentation: 49 y/o female with PMH of bipolar disorder, anxiety, GERD presents to [facility] ED via personal vehicle with c/o diarrhea 20-30 times daily w/ mild abd cramping and tenesmus over the past 2-3 wks  CT scan revealed mild colitis  Admit Inpatient med surg, check Cdiff, start IVF, GI consult, monitor off abx for now, continue home meds  Date: 10/25   Day 2: Hospitalist Note  Pt  reports she still has frequent bowel movements and is sometimes incontinent to bowels  She has mild bilateral lower abdominal pain not radiating  Patient reports she had mucosa in blood in her stool yesterday  She tells me she 2 lost about 7-8 lb over the last month when she had diarrhea    Patient reports she slipped on her porch few weeks ago and had a large bruise on the right gluteal area  Currently bruise has much subsided  Continue IVF,  Check Cdiff, GI and general surgery consult, bowel prep today, clear liquid diet, continue to monitor off abx, continue home meds  10/25 GI Consult:  She has already proven to be positive for Clostridium difficile infection with both PCR and EIA  positive  Vancomycin therapy, 125 mg p o  4 times daily, has been initiated orally  Her white blood cell count of 5 79  Colonoscopy will not be medically necessary  10/25 General Sx Consult:  52y w R buttock hematoma  - h/o fall about 1 month ago  Seen by PCP at the time  - patient here with acute diarrhea over the last 3 weeks  - CT shows hematoma measuring 6 1x1 3x5 3cm in the R upper buttock subcutaneous fat  Hematoma palpable on exam with mild tenderness, no eccymosis, erythema or induration, no overlying skin changes  - presents with about 3 weeks of diarrhea without relief  She did have previous hospital stay with IV antibiotics for L hand infection from possible insect/spider bite requiring I&D  - C  Diff, O&P and stool studies pending  -- No need for surgical intervention at this time  -- Continue to monitor exam as needed for skin changes  -- Warm compresses  -- no need to hold anticoagulation or DVT prophylaxis at this time  -- Diarrhea management per GI    ED Triage Vitals   Temperature Pulse Respirations Blood Pressure SpO2   10/24/21 1507 10/24/21 1507 10/24/21 1507 10/24/21 1507 10/24/21 1507   98 5 °F (36 9 °C) 90 18 146/94 97 %      Temp Source Heart Rate Source Patient Position - Orthostatic VS BP Location FiO2 (%)   10/24/21 1507 10/24/21 1507 10/24/21 1507 10/24/21 1507 --   Oral Monitor Sitting Left arm       Pain Score       10/24/21 1938       8          Wt Readings from Last 1 Encounters:   10/24/21 74 kg (163 lb 3 2 oz)     Additional Vital Signs:   Date/Time  Temp  Pulse  Resp  BP  SpO2  O2 Device  Patient Position - Orthostatic VS   10/25/21 1518  --  60  16  137/78  96 %  --  --   10/25/21 0531  --  57  16  124/65  93 %  None (Room air)  Sitting   10/24/21 1900  --  71  18  144/89  97 %  None (Room air)  Sitting     Pertinent Labs/Diagnostic Test Results:   10/24 CT AP:  1  Liquid stool throughout the colon with mild sigmoid colonic wall thickening, suggesting the possibility of mild colitis  2   Approximately 6 1 cm fluid collection with thin rim enhancement in the right upper buttock subcutaneous fat, suggesting abscess  Possible tract to the right medial buttock skin  Recommend direct inspection  3  Hepatomegaly with severe hepatic steatosis      Results from last 7 days   Lab Units 10/25/21  0531 10/24/21  1802   WBC Thousand/uL 5 79 9 36   HEMOGLOBIN g/dL 12 0 14 1   HEMATOCRIT % 35 3 41 5   PLATELETS Thousands/uL 177 241   NEUTROS ABS Thousands/µL 3 05 6 71     Results from last 7 days   Lab Units 10/25/21  0531 10/24/21  1802   SODIUM mmol/L 146* 142   POTASSIUM mmol/L 3 1* 3 6   CHLORIDE mmol/L 112* 105   CO2 mmol/L 25 24   ANION GAP mmol/L 9 13   BUN mg/dL 14 15   CREATININE mg/dL 0 77 0 80   EGFR ml/min/1 73sq m 92 87   CALCIUM mg/dL 8 4 9 3     Results from last 7 days   Lab Units 10/24/21  1802   AST U/L 35   ALT U/L 56   ALK PHOS U/L 112   TOTAL PROTEIN g/dL 8 0   ALBUMIN g/dL 4 1   TOTAL BILIRUBIN mg/dL 0 43     Results from last 7 days   Lab Units 10/25/21  0531 10/24/21  1802   GLUCOSE RANDOM mg/dL 100 94     Results from last 7 days   Lab Units 10/24/21  1802   LACTIC ACID mmol/L 0 9     Results from last 7 days   Lab Units 10/24/21  1802   LIPASE u/L 62*     Results from last 7 days   Lab Units 10/25/21  0531   CRP mg/L 19 8*     Results from last 7 days   Lab Units 10/24/21  2000   CLARITY UA  Clear   COLOR UA  Light Yellow   SPEC GRAV UA  <=1 005   PH UA  5 0   GLUCOSE UA mg/dl Negative   KETONES UA mg/dl 15 (1+)*   BLOOD UA  Trace-Intact*   PROTEIN UA mg/dl Negative   NITRITE UA  Negative BILIRUBIN UA  Negative   UROBILINOGEN UA E U /dl 0 2   LEUKOCYTES UA  Negative   WBC UA /hpf None Seen   RBC UA /hpf 0-1   BACTERIA UA /hpf None Seen   EPITHELIAL CELLS WET PREP /hpf None Seen     Results from last 7 days   Lab Units 10/24/21  1919   C DIFF TOXIN B BY PCR  Positive*     Results from last 7 days   Lab Units 10/24/21  2022   SALMONELLA SP PCR  None Detected   SHIGELLA SP/ENTEROINVASIVE E  COLI (EIEC)  None Detected   CAMPYLOBACTER SP (JEJUNI AND COLI)  None Detected   SHIGA TOXIN 1/SHIGA TOXIN 2  None Detected     ED Treatment:   Medication Administration from 10/24/2021 1500 to 10/25/2021 1553       Date/Time Order Dose Route Action     10/24/2021 1805 sodium chloride 0 9 % bolus 1,000 mL 1,000 mL Intravenous New Bag     10/24/2021 1918 dicyclomine (BENTYL) tablet 20 mg 20 mg Oral Given     10/24/2021 1840 iohexol (OMNIPAQUE) 350 MG/ML injection (MULTI-DOSE) 100 mL 100 mL Intravenous Given     10/24/2021 1938 ketorolac (TORADOL) injection 15 mg 15 mg Intravenous Given     10/24/2021 2147 sodium chloride 0 9 % infusion 100 mL/hr Intravenous New Bag     10/25/2021 1351 dicyclomine (BENTYL) tablet 20 mg 20 mg Oral Given     10/25/2021 1542 pantoprazole (PROTONIX) EC tablet 40 mg 40 mg Oral Given     10/25/2021 0800 pantoprazole (PROTONIX) EC tablet 40 mg 40 mg Oral Given     10/24/2021 2156 pantoprazole (PROTONIX) EC tablet 40 mg 40 mg Oral Given     10/24/2021 2152 QUEtiapine (SEROquel) tablet 300 mg 200 mg Oral Given     10/25/2021 0948 topiramate (TOPAMAX) tablet 100 mg 100 mg Oral Given     10/24/2021 2153 topiramate (TOPAMAX) tablet 100 mg 100 mg Oral Given     10/25/2021 0947 PARoxetine (PAXIL) tablet 40 mg 40 mg Oral Given     10/25/2021 0947 losartan (COZAAR) tablet 100 mg 100 mg Oral Given     10/25/2021 0955 potassium chloride (K-DUR,KLOR-CON) CR tablet 40 mEq 40 mEq Oral Given     10/25/2021 1500 vancomycin (VANCOCIN) oral solution 125 mg 125 mg Oral Given        Past Medical History: Diagnosis Date    Anxiety     Bipolar 1 disorder ([place] [address] ) 7/24/2020    Bipolar 1 disorder ([place] [address] ) 7/24/2020    Bipolar disease, manic ([place] [address] )     Depression     Schizophrenia ([place] [address] )      Present on Admission:   Bipolar 1 disorder ([place] [address] )   Mixed hyperlipidemia   Gastroesophageal reflux disease without esophagitis   Essential hypertension      Admitting Diagnosis: Diarrhea [R19 7]  Age/Sex: 48 y o  female  Admission Orders:  Scheduled Medications:  losartan, 100 mg, Oral, Daily  nicotine, 1 patch, Transdermal, Daily  pantoprazole, 40 mg, Oral, BID  PARoxetine, 40 mg, Oral, QAM  potassium chloride, 40 mEq, Oral, BID  QUEtiapine, 300 mg, Oral, HS  topiramate, 100 mg, Oral, BID  vancomycin, 125 mg, Oral, Q6H [address]      Continuous IV Infusions:  sodium chloride, 100 mL/hr, Intravenous, Continuous      PRN Meds:  acetaminophen, 650 mg, Oral, Q6H PRN  busPIRone, 10 mg, Oral, BID PRN  dicyclomine, 20 mg, Oral, Q6H PRN  furosemide, 40 mg, Oral, Daily PRN    SCD  IO    IP CONSULT TO GASTROENTEROLOGY  IP CONSULT TO ACUTE CARE SURGERY    Network Utilization Review Department  ATTENTION: Please call with any questions or concerns to [phone] and carefully listen to the prompts so that you are directed to the right person  All voicemails are confidential   Ana Talavera all requests for admission clinical reviews, approved or denied determinations and any other requests to dedicated fax number below belonging to the campus where the patient is receiving treatment   List of dedicated fax numbers for the Facilities:  [address] DENIALS (Administrative/Medical Necessity) [phone]   [address] (Maternity/NICU/Pediatrics) [phone]   [address]  [address]  Sebastian Rooney 1277 [phone] [address] [place] [address] [address]an [address] [phone]

## 2021-10-25 NOTE — H&P
[address] 1975, 48 y o  female MRN: [id number]  Unit/Bed#: ED 10 Encounter: [id number]  Primary Care Provider: Aniya Carlos MD   Date and time admitted to hospital: 10/24/2021  4:29 PM    * Colitis  Assessment & Plan  Has been having significant diarrhea approximately 20-30 bowel movements per day for the past 2-3 weeks  She post to get a colonoscopy with GI however did not go for procedure  CT scan revealed mild colitis  Will check C diff  Will place on IVF   GI consult for possible colonoscopy    Abscess  Assessment & Plan  CT scan revealed 6 1 cm fluid collection suggesting abscess  However on physical examination appears to be hematoma  WBC count within normal limits  Currently afebrile  Will monitor off antibiotics for now  Emergency department discussed with surgery who will see in consultation        Mixed hyperlipidemia  Assessment & Plan  Continue colestipol    Essential hypertension  Assessment & Plan  Continue home ARB  BP currently controlled    Bipolar 1 disorder (HCC)  Assessment & Plan  Continue home Seroquel, topiramate    Gastroesophageal reflux disease without esophagitis  Assessment & Plan  Continue home Protonix      VTE Prophylaxis: Pharmacologic VTE Prophylaxis contraindicated due to low risk  / sequential compression device   Code Status:  Full code  POLST: There is no POLST form on file for this patient (pre-hospital)  Discussion with family: pt    Anticipated Length of Stay:  Patient will be admitted on an Inpatient basis with an anticipated length of stay of  > 2 midnights  Justification for Hospital Stay:  Diarrhea    Total Time for Visit, including Counseling / Coordination of Care: 60 minutes  Greater than 50% of this total time spent on direct patient counseling and coordination of care      Chief Complaint:   Diarrhea    History of Present Illness:    Amaury Ruano is a 48 y o  female with past medical history significant of bipolar disorder, GERD, hypertension, hyperlipidemia initially presented with diarrhea  Patient reports diarrhea for the past 2-3 weeks  Reports nonbloody watery bowel movements approximately 20-30 bowel movements per day  She otherwise denies any acute complaints  Denies nausea, vomiting, chest pain, shortness breath, palpitations, fevers, chills, cough, dysuria or any other symptoms  Review of Systems:    Review of Systems    Past Medical and Surgical History:     Past Medical History:   Diagnosis Date    Anxiety     Bipolar 1 disorder ([facility] ) 2020    Bipolar 1 disorder ([facility] ) 2020    Bipolar disease, manic ([place] [address] )     Depression     Schizophrenia ([place] [address] )        Past Surgical History:   Procedure Laterality Date     SECTION      x2    HYSTERECTOMY      KNEE SURGERY Left     4 times    LEG SURGERY Right     thony placed then removed, broken femur    NEUROPLASTY / TRANSPOSITION MEDIAN NERVE AT CARPAL TUNNEL         Meds/Allergies:    Prior to Admission medications    Medication Sig Start Date End Date Taking?  Authorizing Provider   busPIRone (BUSPAR) 10 mg tablet TAKE 1 TABLET BY MOUTH 2 TIMES A DAY AS NEEDED FOR ANXIETY 21   Oscar Butts MD   colestipol (COLESTID) 1 g tablet Take 1 tablet (1 g total) by mouth 2 (two) times a day 21   Mary Alonso PA-C   cyclobenzaprine (FLEXERIL) 10 mg tablet Take 1 tablet (10 mg total) by mouth 3 (three) times a day as needed for muscle spasms 21   Oscar Butts MD   dicyclomine (BENTYL) 20 mg tablet Take 1 tablet (20 mg total) by mouth every 6 (six) hours as needed (abdominal pain) 21   Mary Alonso PA-C   furosemide (LASIX) 40 mg tablet Take 1 tablet (40 mg total) by mouth daily as needed (edema) 20   Oscar Butts MD   ibuprofen (MOTRIN) 400 mg tablet Take 1 tablet (400 mg total) by mouth every 6 (six) hours as needed for mild pain 10/1/21   Oscar Butts MD   omeprazole (PriLOSEC) 40 MG capsule Take 1 capsule (40 mg total) by mouth daily before breakfast 4/23/21   Jany Wells MD   ondansetron (ZOFRAN-ODT) 4 mg disintegrating tablet Take 1 tablet (4 mg total) by mouth every 6 (six) hours as needed for nausea or vomiting 10/28/20   Maritza Hernandez PA-C   pantoprazole (PROTONIX) 40 mg tablet TAKE 1 TABLET BY MOUTH TWICE A DAY 9/29/21   Amando Pat PA-C   PARoxetine (PAXIL) 40 MG tablet Take 1 tablet (40 mg total) by mouth every morning 2/12/21   Jany Wells MD   QUEtiapine (SEROquel) 300 mg tablet Take 1 tablet (300 mg total) by mouth daily at bedtime 2/12/21   Jany Wells MD   QUEtiapine (SEROquel) 50 mg tablet Take 1 tablet (50 mg total) by mouth daily at bedtime 4/23/21   Jany Wells MD   topiramate (TOPAMAX) 100 mg tablet Take 1 tablet (100 mg total) by mouth 2 (two) times a day 2/12/21   Jany Wells MD   valACYclovir (VALTREX) 1,000 mg tablet Take 1 tablet (1,000 mg total) by mouth daily 7/24/20 10/22/20  Jany Wells MD   valsartan (DIOVAN) 320 MG tablet TAKE 1 TABLET BY MOUTH EVERY DAY 8/24/21   Jany Wells MD     I have reviewed home medications with patient personally  Allergies: Allergies   Allergen Reactions    Ciprofloxacin Hives    Other Other (See Comments)     unknown  Other reaction(s):  Other (See Comments)  unknown    Wound Dressing Adhesive Other (See Comments)     unknown    Atarax [Hydroxyzine] Palpitations       Social History:     Marital Status: /Civil Union     Patient Pre-hospital Living Situation: home  Patient Pre-hospital Level of Mobility: independent  Patient Pre-hospital Diet Restrictions: none  Substance Use History:   Social History     Substance and Sexual Activity   Alcohol Use Not Currently     Social History     Tobacco Use   Smoking Status Current Every Day Smoker    Packs/day: 0 25    Types: Cigarettes   Smokeless Tobacco Never Used     Social History     Substance and Sexual Activity   Drug Use Not Currently    Types: Methamphetamines       Family History:    Family History   Family history unknown: Yes       Physical Exam:     Vitals:   Blood Pressure: 144/89 (10/24/21 1900)  Pulse: 71 (10/24/21 1900)  Temperature: 98 5 °F (36 9 °C) (10/24/21 1507)  Temp Source: Oral (10/24/21 1507)  Respirations: 18 (10/24/21 1900)  Height: 5' 4" (162 6 cm) (10/24/21 1507)  Weight - Scale: 74 kg (163 lb 3 2 oz) (10/24/21 1507)  SpO2: 97 % (10/24/21 1900)    Physical Exam  Constitutional:       General: She is not in acute distress  Appearance: She is well-developed  She is not diaphoretic  HENT:      Head: Normocephalic and atraumatic  Nose: Nose normal       Mouth/Throat:      Pharynx: No oropharyngeal exudate  Eyes:      General: No scleral icterus  Right eye: No discharge  Left eye: No discharge  Conjunctiva/sclera: Conjunctivae normal    Neck:      Thyroid: No thyromegaly  Vascular: No JVD  Cardiovascular:      Rate and Rhythm: Normal rate and regular rhythm  Heart sounds: Normal heart sounds  No murmur heard  No friction rub  No gallop  Pulmonary:      Effort: Pulmonary effort is normal  No respiratory distress  Breath sounds: Normal breath sounds  No wheezing or rales  Chest:      Chest wall: No tenderness  Abdominal:      General: Bowel sounds are normal  There is no distension  Palpations: Abdomen is soft  Tenderness: There is no abdominal tenderness  There is no guarding or rebound  Musculoskeletal:         General: No tenderness or deformity  Normal range of motion  Cervical back: Normal range of motion and neck supple  Skin:     General: Skin is warm and dry  Findings: No erythema or rash  Neurological:      Mental Status: She is alert  Mental status is at baseline  Cranial Nerves: No cranial nerve deficit  Sensory: No sensory deficit  Motor: No abnormal muscle tone        Coordination: Coordination normal              Additional Data: Lab Results: I have personally reviewed pertinent reports  Results from last 7 days   Lab Units 10/24/21  1802   WBC Thousand/uL 9 36   HEMOGLOBIN g/dL 14 1   HEMATOCRIT % 41 5   PLATELETS Thousands/uL 241   NEUTROS PCT % 72   LYMPHS PCT % 17   MONOS PCT % 10   EOS PCT % 1     Results from last 7 days   Lab Units 10/24/21  1802   SODIUM mmol/L 142   POTASSIUM mmol/L 3 6   CHLORIDE mmol/L 105   CO2 mmol/L 24   BUN mg/dL 15   CREATININE mg/dL 0 80   ANION GAP mmol/L 13   CALCIUM mg/dL 9 3   ALBUMIN g/dL 4 1   TOTAL BILIRUBIN mg/dL 0 43   ALK PHOS U/L 112   ALT U/L 56   AST U/L 35   GLUCOSE RANDOM mg/dL 94                 Results from last 7 days   Lab Units 10/24/21  1802   LACTIC ACID mmol/L 0 9       Imaging: I have personally reviewed pertinent reports  CT abdomen pelvis with contrast   Final Result by Marija Cho MD (10/24 1921)      1  Liquid stool throughout the colon with mild sigmoid colonic wall thickening, suggesting the possibility of mild colitis  2   Approximately 6 1 cm fluid collection with thin rim enhancement in the right upper buttock subcutaneous fat, suggesting abscess  Possible tract to the right medial buttock skin  Recommend direct inspection  3  Hepatomegaly with severe hepatic steatosis  The study was marked in [facility] for immediate notification  Workstation performed: [id number]             EKG, Pathology, and Other Studies Reviewed on Admission:   · EKG: reviewed    Allscripts / Epic Records Reviewed: Yes     ** Please Note: This note has been constructed using a voice recognition system   **

## 2021-10-25 NOTE — ED PROVIDER NOTES
History  Chief Complaint   Patient presents with    Diarrhea     pt states she needs to go to have BM like 20-30 times a day, pt states it got worse after she fell, pt was scheduled for colonoscopy but did not go for it  47 y/o female presents to the emergency room with diarrhea, abdominal pain, and nausea x 3 weeks  Patient states that she has about 20-30 episodes of diarrhea/ day  States that she has associated lower abd cramping and nausea  She denies any fevers, cough, shortness of breath, vomiting, or urinary symptoms  She states that she was scheduled for a colonoscopy but did not go  She has tried anti diarrheal meds without any improvement  She states that today she was at work and felt like she was going to out so she came in for evaluation  She also complains of lump to her right buttock x 1 month  She states that 1 month ago she slipped and fell down steps onto her buttocks  She states that she developed swelling and bruising to the area and reports that it has never fully healed  She denies any skin changes, warmth, or redness to the area  States that it is mildly tender but continues to be swollen  She denies any other complaints  No known sick contacts or recent travel  History provided by:  Patient  Diarrhea  Quality:  Mucous  Severity:  Moderate  Onset quality:  Sudden  Number of episodes:  20-30  Timing:  Constant  Progression:  Worsening  Relieved by:  Nothing  Worsened by:  Nothing  Ineffective treatments:  Anti-motility medications  Associated symptoms: abdominal pain    Associated symptoms: no chills, no recent cough, no fever, no headaches and no vomiting    Risk factors: no sick contacts and no travel to endemic areas        Prior to Admission Medications   Prescriptions Last Dose Informant Patient Reported? Taking?    PARoxetine (PAXIL) 40 MG tablet  Self No No   Sig: Take 1 tablet (40 mg total) by mouth every morning   QUEtiapine (SEROquel) 300 mg tablet  Self No No Sig: Take 1 tablet (300 mg total) by mouth daily at bedtime   QUEtiapine (SEROquel) 50 mg tablet  Self No No   Sig: Take 1 tablet (50 mg total) by mouth daily at bedtime   busPIRone (BUSPAR) 10 mg tablet  Self No No   Sig: TAKE 1 TABLET BY MOUTH 2 TIMES A DAY AS NEEDED FOR ANXIETY   colestipol (COLESTID) 1 g tablet   No No   Sig: Take 1 tablet (1 g total) by mouth 2 (two) times a day   cyclobenzaprine (FLEXERIL) 10 mg tablet  Self No No   Sig: Take 1 tablet (10 mg total) by mouth 3 (three) times a day as needed for muscle spasms   dicyclomine (BENTYL) 20 mg tablet   No No   Sig: Take 1 tablet (20 mg total) by mouth every 6 (six) hours as needed (abdominal pain)   furosemide (LASIX) 40 mg tablet  Self No No   Sig: Take 1 tablet (40 mg total) by mouth daily as needed (edema)   ibuprofen (MOTRIN) 400 mg tablet   No No   Sig: Take 1 tablet (400 mg total) by mouth every 6 (six) hours as needed for mild pain   omeprazole (PriLOSEC) 40 MG capsule  Self No No   Sig: Take 1 capsule (40 mg total) by mouth daily before breakfast   ondansetron (ZOFRAN-ODT) 4 mg disintegrating tablet  Self No No   Sig: Take 1 tablet (4 mg total) by mouth every 6 (six) hours as needed for nausea or vomiting   pantoprazole (PROTONIX) 40 mg tablet   No No   Sig: TAKE 1 TABLET BY MOUTH TWICE A DAY   topiramate (TOPAMAX) 100 mg tablet  Self No No   Sig: Take 1 tablet (100 mg total) by mouth 2 (two) times a day   valACYclovir (VALTREX) 1,000 mg tablet   No No   Sig: Take 1 tablet (1,000 mg total) by mouth daily   valsartan (DIOVAN) 320 MG tablet   No No   Sig: TAKE 1 TABLET BY MOUTH EVERY DAY      Facility-Administered Medications: None       Past Medical History:   Diagnosis Date    Anxiety     Bipolar 1 disorder ([facility] ) 2020    Bipolar 1 disorder ([facility] ) 2020    Bipolar disease, manic ([facility] )     Depression     Schizophrenia (HCC)        Past Surgical History:   Procedure Laterality Date     SECTION      x2    HYSTERECTOMY  KNEE SURGERY Left     4 times    LEG SURGERY Right     thony placed then removed, broken femur    NEUROPLASTY / TRANSPOSITION MEDIAN NERVE AT CARPAL TUNNEL         Family History   Family history unknown: Yes     I have reviewed and agree with the history as documented  E-Cigarette/Vaping    E-Cigarette Use Never User      E-Cigarette/Vaping Substances    Nicotine No     THC No     CBD No     Flavoring No     Other No     Unknown No      Social History     Tobacco Use    Smoking status: Current Every Day Smoker     Packs/day: 0 25     Types: Cigarettes    Smokeless tobacco: Never Used   Vaping Use    Vaping Use: Never used   Substance Use Topics    Alcohol use: Not Currently    Drug use: Not Currently     Types: Methamphetamines       Review of Systems   Constitutional: Negative for chills and fever  HENT: Negative for congestion, ear pain and sore throat  Eyes: Negative for pain and visual disturbance  Respiratory: Negative for cough, shortness of breath and wheezing  Cardiovascular: Negative for chest pain and leg swelling  Gastrointestinal: Positive for abdominal pain, diarrhea and nausea  Negative for vomiting  Genitourinary: Negative for dysuria, frequency, hematuria and urgency  Musculoskeletal: Negative for neck pain and neck stiffness  Skin: Negative for rash and wound  Neurological: Negative for weakness, numbness and headaches  Psychiatric/Behavioral: Negative for agitation and confusion  All other systems reviewed and are negative  Physical Exam  Physical Exam  Vitals and nursing note reviewed  Constitutional:       Appearance: She is well-developed  HENT:      Head: Normocephalic and atraumatic  Eyes:      Pupils: Pupils are equal, round, and reactive to light  Cardiovascular:      Rate and Rhythm: Normal rate and regular rhythm  Pulmonary:      Effort: Pulmonary effort is normal       Breath sounds: Normal breath sounds     Abdominal: General: Bowel sounds are normal       Palpations: Abdomen is soft  Tenderness: There is abdominal tenderness in the right lower quadrant, suprapubic area and left lower quadrant  There is no guarding or rebound  Musculoskeletal:         General: Normal range of motion  Cervical back: Normal range of motion and neck supple  Comments: Right buttocks- 6cm area of swelling  No redness, warmth, or drainage noted  Minimal tendernss to the areas  No bony tenderness of spine or pelvis  Skin:     General: Skin is warm and dry  Neurological:      General: No focal deficit present  Mental Status: She is alert and oriented to person, place, and time  Comments: No focal deficits         Vital Signs  ED Triage Vitals   Temperature Pulse Respirations Blood Pressure SpO2   10/24/21 1507 10/24/21 1507 10/24/21 1507 10/24/21 1507 10/24/21 1507   98 5 °F (36 9 °C) 90 18 146/94 97 %      Temp Source Heart Rate Source Patient Position - Orthostatic VS BP Location FiO2 (%)   10/24/21 1507 10/24/21 1507 10/24/21 1507 10/24/21 1507 --   Oral Monitor Sitting Left arm       Pain Score       10/24/21 1938       8           Vitals:    10/24/21 1507 10/24/21 1900   BP: 146/94 144/89   Pulse: 90 71   Patient Position - Orthostatic VS: Sitting Sitting         Visual Acuity      ED Medications  Medications   sodium chloride 0 9 % bolus 1,000 mL (0 mL Intravenous Stopped 10/24/21 2005)   dicyclomine (BENTYL) tablet 20 mg (20 mg Oral Given 10/24/21 1918)   iohexol (OMNIPAQUE) 350 MG/ML injection (MULTI-DOSE) 100 mL (100 mL Intravenous Given 10/24/21 1840)   ketorolac (TORADOL) injection 15 mg (15 mg Intravenous Given 10/24/21 1938)       Diagnostic Studies  Results Reviewed     Procedure Component Value Units Date/Time    Stool Enteric Bacterial Panel by PCR [[id number]] Collected: 10/24/21 2022    Lab Status:  In process Specimen: Stool from Rectum Updated: 10/24/21 2025    UA w Reflex to Microscopic w Reflex to Culture [[id number]]  (Abnormal) Collected: 10/24/21 2000    Lab Status: Final result Specimen: Urine, Clean Catch Updated: 10/24/21 2006     Color, UA Light Yellow     Clarity, UA Clear     Specific Gravity, UA <=1 005     pH, UA 5 0     Leukocytes, UA Negative     Nitrite, UA Negative     Protein, UA Negative mg/dl      Glucose, UA Negative mg/dl      Ketones, UA 15 (1+) mg/dl      Urobilinogen, UA 0 2 E U /dl      Bilirubin, UA Negative     Blood, UA Trace-Intact    Urine Microscopic [[id number]] Collected: 10/24/21 2000    Lab Status: In process Specimen: Urine, Clean Catch Updated: 10/24/21 2006    Clostridium difficile toxin by PCR with EIA [[id number]] Collected: 10/24/21 1919    Lab Status: In process Specimen: Stool from Per Rectum Updated: 10/24/21 1926    Ova and parasite examination [[id number]] Collected: 10/24/21 1919    Lab Status: In process Specimen: Stool from Rectum Updated: 10/24/21 1926    Lactic acid, plasma [[id number]]  (Normal) Collected: 10/24/21 1802    Lab Status: Final result Specimen: Blood from Arm, Right Updated: 10/24/21 1832     LACTIC ACID 0 9 mmol/L     Narrative:      Result may be elevated if tourniquet was used during collection      Comprehensive metabolic panel [[id number]] Collected: 10/24/21 1802    Lab Status: Final result Specimen: Blood from Arm, Right Updated: 10/24/21 1824     Sodium 142 mmol/L      Potassium 3 6 mmol/L      Chloride 105 mmol/L      CO2 24 mmol/L      ANION GAP 13 mmol/L      BUN 15 mg/dL      Creatinine 0 80 mg/dL      Glucose 94 mg/dL      Calcium 9 3 mg/dL      AST 35 U/L      ALT 56 U/L      Alkaline Phosphatase 112 U/L      Total Protein 8 0 g/dL      Albumin 4 1 g/dL      Total Bilirubin 0 43 mg/dL      eGFR 87 ml/min/1 73sq m     Narrative:      [place] guidelines for Chronic Kidney Disease (CKD):     Stage 1 with normal or high GFR (GFR > 90 mL/min/1 73 square meters)    Stage 2 Mild CKD (GFR = 60-89 mL/min/1 73 square meters)    Stage 3A Moderate CKD (GFR = 45-59 mL/min/1 73 square meters)    Stage 3B Moderate CKD (GFR = 30-44 mL/min/1 73 square meters)    Stage 4 Severe CKD (GFR = 15-29 mL/min/1 73 square meters)    Stage 5 End Stage CKD (GFR <15 mL/min/1 73 square meters)  Note: GFR calculation is accurate only with a steady state creatinine    Lipase [[id number]]  (Abnormal) Collected: 10/24/21 1802    Lab Status: Final result Specimen: Blood from Arm, Right Updated: 10/24/21 1824     Lipase 62 u/L     CBC and differential [[id number]] Collected: 10/24/21 1802    Lab Status: Final result Specimen: Blood from Arm, Right Updated: 10/24/21 1810     WBC 9 36 Thousand/uL      RBC 4 80 Million/uL      Hemoglobin 14 1 g/dL      Hematocrit 41 5 %      MCV 87 fL      MCH 29 4 pg      MCHC 34 0 g/dL      RDW 12 5 %      MPV 9 7 fL      Platelets 874 Thousands/uL      nRBC 0 /100 WBCs      Neutrophils Relative 72 %      Immat GRANS % 0 %      Lymphocytes Relative 17 %      Monocytes Relative 10 %      Eosinophils Relative 1 %      Basophils Relative 0 %      Neutrophils Absolute 6 71 Thousands/µL      Immature Grans Absolute 0 04 Thousand/uL      Lymphocytes Absolute 1 55 Thousands/µL      Monocytes Absolute 0 90 Thousand/µL      Eosinophils Absolute 0 12 Thousand/µL      Basophils Absolute 0 04 Thousands/µL                  CT abdomen pelvis with contrast   Final Result by Kary Dobbs MD (10/24 1921)      1  Liquid stool throughout the colon with mild sigmoid colonic wall thickening, suggesting the possibility of mild colitis  2   Approximately 6 1 cm fluid collection with thin rim enhancement in the right upper buttock subcutaneous fat, suggesting abscess  Possible tract to the right medial buttock skin  Recommend direct inspection  3  Hepatomegaly with severe hepatic steatosis  The study was marked in [facility] for immediate notification              Workstation performed: [id number] Procedures  Procedures         ED Course  ED Course as of Oct 24 2036   Opal Suarez Oct 24, 2021   1653 Diarrhea x 2-3 weeks goes 20-30 times a day with associated lower abd cramping  Had appointment for colonoscopy but didn't go  Has tried antidiarrheal meds without improvement  Today was at work and felt like she was going to pass out  Has had nausea no vomiting  Also slipped on wet floor and fell 2 weeks ago onto buttocks- states pain and symptoms worsened after that                                 SBIRT 20yo+      Most Recent Value   SBIRT (22 yo +)   In order to provide better care to our patients, we are screening all of our patients for alcohol and drug use  Would it be okay to ask you these screening questions? No Filed at: 10/24/2021 1858                    MDM  Number of Diagnoses or Management Options  Colitis: new and requires workup  Diarrhea: new and requires workup  Traumatic hematoma of buttock: new and requires workup  Diagnosis management comments: Patient with diarrhea/ abd pain, and right buttock swelling- will get labs, ct scan, and stool studies  Will give fluids/ bentyl and reassess  Patient reevaluated and feels improved  Patient updated on results of tests and plan of care including admission to hospital for further evaluation of presenting complaint  Patient demonstrates verbal understanding and agrees with plan  Report to Stephen GOLDMAN for continuation of patient care          Amount and/or Complexity of Data Reviewed  Clinical lab tests: ordered and reviewed  Tests in the radiology section of CPT®: ordered and reviewed  Tests in the medicine section of CPT®: ordered and reviewed  Discussion of test results with the performing providers: yes  Decide to obtain previous medical records or to obtain history from someone other than the patient: yes  Obtain history from someone other than the patient: yes  Review and summarize past medical records: yes  Discuss the patient with other providers: yes  Independent visualization of images, tracings, or specimens: yes    Patient Progress  Patient progress: improved      Disposition  Final diagnoses:   Colitis   Traumatic hematoma of buttock   Diarrhea     Time reflects when diagnosis was documented in both MDM as applicable and the Disposition within this note     Time User Action Codes Description Comment    10/24/2021  8:34 PM Robbi Katarina A Add [K52 9] Colitis     10/24/2021  8:35 PM Robbi Katarina A Add [S30  0XXA] Traumatic hematoma of buttock     10/24/2021  8:35 PM Robbi Katarina A Add [R19 7] Diarrhea       ED Disposition     ED Disposition Condition Date/Time Comment    Admit Stable Sun Oct 24, 2021  8:34 PM Case was discussed with JONES and the patient's admission status was agreed to be Admission Status: inpatient status to the service of Dr Rashard Randolph   Follow-up Information    None         Patient's Medications   Discharge Prescriptions    No medications on file     No discharge procedures on file      PDMP Review     None          ED Provider  Electronically Signed by           Peng Schroeder DO  10/24/21 2036

## 2021-10-25 NOTE — ASSESSMENT & PLAN NOTE
Has been having significant diarrhea approximately 20-30 bowel movements per day for the past 2-3 weeks  She post to get a colonoscopy with GI however did not go for procedure  CT scan revealed mild colitis  Will check C diff  Will place on IVF   GI consult for possible colonoscopy

## 2021-10-25 NOTE — CONSULTS
Consultation - General Surgery  Lucinda Rdz 48 y o  female MRN: [id number]  Unit/Bed#: ED 10 Encounter: [id number]                                                  Inpatient consult to Acute Care Surgery  Consult performed by: Lizbet Garcia PA-C  Consult ordered by: Lucho Mayorga MD        Assessment/Plan   68G w R buttock hematoma  - h/o fall about 1 month ago  Seen by PCP at the time  - patient here with acute diarrhea over the last 3 weeks  - CT shows hematoma measuring 6 1x1 3x5 3cm in the R upper buttock subcutaneous fat  Hematoma palpable on exam with mild tenderness, no eccymosis, erythema or induration, no overlying skin changes  collitis  - presents with about 3 weeks of diarrhea without relief  She did have previous hospital stay with IV antibiotics for L hand infection from possible insect/spider bite requiring I&D  - C  Diff, O&P and stool studies pending    Plan  -- No need for surgical intervention at this time  -- Continue to monitor exam as needed for skin changes  -- Warm compresses  -- no need to hold anticoagulation or DVT prophylaxis at this time  -- Diarrhea management per GI  -- SLIM primary  ______________________________________________________________________    CHIEF COMPLAINT:  I fell down the stairs about a month ago, when it was wet outside, and I slipped    HPI: Lucinda Rdz is a 48y o  year old female with PMHx of bipolar disorder and schizophrenia who presents with reports of  20-30 episodes of diarrhea daily for about 3 weeks  She was previously hospitalized for infection in her L hand/finger requiring debridement and she was on a long course of antibiotics prior to the start of her diarrhea  Patient mentioned she had slid and fell on wet steps shortly before her diarrhea had started  She had seen her PCP at the time who told her she had  Hematoma which would eventually resolve   She was concerned that maybe the fall had cause internal damage and that may have contributed to the diarrhea  She states she had a large bruise at her lower back/upper buttocks which correlates with       Review of Systems   Constitutional: Positive for appetite change  Negative for activity change, chills and fever  HENT: Negative  Eyes: Negative  Respiratory: Negative for cough, chest tightness, shortness of breath and wheezing  Cardiovascular: Negative for chest pain, palpitations and leg swelling  Gastrointestinal: Positive for abdominal pain and diarrhea  Negative for abdominal distention, blood in stool, constipation, nausea and vomiting  Endocrine: Negative  Genitourinary: Negative for difficulty urinating, dysuria and frequency  Musculoskeletal: Negative  Skin: Positive for wound (R buttock hematoma)  Allergic/Immunologic: Negative  Neurological: Negative  Hematological: Negative  Psychiatric/Behavioral: Negative  All other systems reviewed and are negative  Meds/Allergies   Allergies   Allergen Reactions    Ciprofloxacin Hives    Other Other (See Comments)     unknown  Other reaction(s):  Other (See Comments)  unknown    Wound Dressing Adhesive Other (See Comments)     unknown    Atarax [Hydroxyzine] Palpitations      all current active meds have been reviewed       Historical Information   Past Medical History:   Diagnosis Date    Anxiety     [facility]ipolar [facility] disorder ([facility] ) 2020    [facility]    Bipolar disease, manic ([facility]     Depression     Schizophrenia ([facility] )      Past Surgical History:   Procedure Laterality Date     SECTION      x2    HYSTERECTOMY      KNEE SURGERY Left     4 times    LEG SURGERY Right     thony placed then removed, broken femur    NEUROPLASTY / TRANSPOSITION MEDIAN NERVE AT CARPAL TUNNEL       Social History   Social History     Substance and Sexual Activity   Alcohol Use Not Currently     Social History     Substance and Sexual Activity   Drug Use Not Currently    Types: Methamphetamines Social History     Tobacco Use   Smoking Status Current Every Day Smoker    Packs/day: 0 25    Types: Cigarettes   Smokeless Tobacco Never Used       Family History: non-contributory      Objective   Lab Results:   Lab Results   Component Value Date    WBC 5 79 10/25/2021    HGB 12 0 10/25/2021    HCT 35 3 10/25/2021     10/25/2021     Lab Results   Component Value Date    K 3 1 (L) 10/25/2021    K 4 5 07/28/2020     (H) 10/25/2021     07/28/2020    CO2 25 10/25/2021    CO2 22 07/28/2020    BUN 14 10/25/2021    BUN 22 07/28/2020    CREATININE 0 77 10/25/2021     Lab Results   Component Value Date    CALCIUM 8 4 10/25/2021    MG 2 1 09/26/2021    PHOS 3 6 09/26/2021     Lab Results   Component Value Date    AST 35 10/24/2021    AST 24 07/28/2020    ALT 56 10/24/2021    ALT 33 (H) 07/28/2020    ALKPHOS 112 10/24/2021    TBILI 0 43 10/24/2021    TBILI 0 2 07/28/2020    ALB 4 1 10/24/2021     Lab Results   Component Value Date    INR 0 93 01/12/2020     Lab Results   Component Value Date    COLORU Light Yellow 10/24/2021    CLARITYU Clear 10/24/2021    SPECGRAV <=1 005 10/24/2021    PHUR 5 0 10/24/2021    GLUCOSEU Negative 10/24/2021    KETONESU 15 (1+) (A) 10/24/2021    BLOODU Trace-Intact (A) 10/24/2021    NITRITE Negative 10/24/2021    LEUKOCYTESUR Negative 10/24/2021    BILIRUBINUR Negative 10/24/2021    UROBILINOGEN 0 2 10/24/2021    UROBILINOGEN 0 2 07/24/2020    RBCUA 0-1 10/24/2021    WBCUA None Seen 10/24/2021    BACTERIA None Seen 10/24/2021         Intake/Output Summary (Last 24 hours) at 10/25/2021 1239  Last data filed at 10/24/2021 2005  Gross per 24 hour   Intake 1000 ml   Output --   Net 1000 ml     Invasive Devices     Peripheral Intravenous Line            Peripheral IV 10/24/21 Right Antecubital <1 day                Physical Exam  Vitals: /65 (BP Location: Right arm)   Pulse 57   Temp 98 5 °F (36 9 °C) (Oral)   Resp 16   Ht 5' 4" (1 626 m)   Wt 74 kg (163 lb 3 2 oz)   SpO2 93%   BMI 28 01 kg/m²   GEN: A & O x 3, cooperative   HEENT: PERRLA EOMI, sclera anicterus, oral mucosa pink  NECK: supple   LUNGS: clear throughout  COR: RRR no murmur  ABD: +BS, NTND, no masses, no guarding or rigidity  EXTREM: FROM no joint deformities  Edema non  SKIN: warm dry, no rash  R buttock with palpable fluid collection and proximal buttock, mild tenderness, no erythema, edema or induration of the skin, no ecchymosis  NEURO: CN II -XII intact, no tremor, affect appropriate    Imaging Studies:   CT abdomen pelvis with contrast    Result Date: 10/24/2021  Impression: 1  Liquid stool throughout the colon with mild sigmoid colonic wall thickening, suggesting the possibility of mild colitis  2   Approximately 6 1 cm fluid collection with thin rim enhancement in the right upper buttock subcutaneous fat, suggesting abscess  Possible tract to the right medial buttock skin  Recommend direct inspection  3  Hepatomegaly with severe hepatic steatosis  The study was marked in [facility] for immediate notification   Workstation performed: Georgi Tello PA-C  10/25/2021

## 2021-10-25 NOTE — ASSESSMENT & PLAN NOTE
Has been having significant diarrhea approximately 20-30 bowel movements per day for the past 2-3 weeks  Reports weight loss  Reports mucous and blood in stool yesterday    Had similar symptoms years ago  She was supposed to get a colonoscopy with GI however did not go for procedure  CT scan revealed mild colitis    -- Will check C diff  -- stool viral/ bacterial panel  -- continue on IVF   -- GI consult , plan for colonoscopy tomorrow  --  bowel prep today  -- follow calprotectin, CRP  -- patient is on clear liquid

## 2021-10-26 PROBLEM — A04.72 C. DIFFICILE COLITIS: Status: ACTIVE | Noted: 2021-10-24

## 2021-10-26 LAB
ANION GAP SERPL CALCULATED.3IONS-SCNC: 10 MMOL/L (ref 4–13)
BASOPHILS # BLD AUTO: 0.03 THOUSANDS/ΜL (ref 0–0.1)
BASOPHILS NFR BLD AUTO: 1 % (ref 0–1)
BUN SERPL-MCNC: 15 MG/DL (ref 5–25)
CALCIUM SERPL-MCNC: 8.7 MG/DL (ref 8.3–10.1)
CAMPYLOBACTER DNA SPEC NAA+PROBE: NORMAL
CHLORIDE SERPL-SCNC: 115 MMOL/L (ref 100–108)
CO2 SERPL-SCNC: 23 MMOL/L (ref 21–32)
CREAT SERPL-MCNC: 0.81 MG/DL (ref 0.6–1.3)
EOSINOPHIL # BLD AUTO: 0.22 THOUSAND/ΜL (ref 0–0.61)
EOSINOPHIL NFR BLD AUTO: 5 % (ref 0–6)
ERYTHROCYTE [DISTWIDTH] IN BLOOD BY AUTOMATED COUNT: 12.8 % (ref 11.6–15.1)
GFR SERPL CREATININE-BSD FRML MDRD: 86 ML/MIN/1.73SQ M
GLUCOSE SERPL-MCNC: 164 MG/DL (ref 65–140)
HCT VFR BLD AUTO: 35.6 % (ref 34.8–46.1)
HGB BLD-MCNC: 11.8 G/DL (ref 11.5–15.4)
IMM GRANULOCYTES # BLD AUTO: 0.01 THOUSAND/UL (ref 0–0.2)
IMM GRANULOCYTES NFR BLD AUTO: 0 % (ref 0–2)
LYMPHOCYTES # BLD AUTO: 1.69 THOUSANDS/ΜL (ref 0.6–4.47)
LYMPHOCYTES NFR BLD AUTO: 36 % (ref 14–44)
MCH RBC QN AUTO: 29.3 PG (ref 26.8–34.3)
MCHC RBC AUTO-ENTMCNC: 33.1 G/DL (ref 31.4–37.4)
MCV RBC AUTO: 88 FL (ref 82–98)
MONOCYTES # BLD AUTO: 0.6 THOUSAND/ΜL (ref 0.17–1.22)
MONOCYTES NFR BLD AUTO: 13 % (ref 4–12)
NEUTROPHILS # BLD AUTO: 2.19 THOUSANDS/ΜL (ref 1.85–7.62)
NEUTS SEG NFR BLD AUTO: 45 % (ref 43–75)
NRBC BLD AUTO-RTO: 0 /100 WBCS
PLATELET # BLD AUTO: 172 THOUSANDS/UL (ref 149–390)
PMV BLD AUTO: 9.7 FL (ref 8.9–12.7)
POTASSIUM SERPL-SCNC: 4.1 MMOL/L (ref 3.5–5.3)
RBC # BLD AUTO: 4.03 MILLION/UL (ref 3.81–5.12)
SALMONELLA DNA SPEC QL NAA+PROBE: NORMAL
SHIGA TOXIN STX GENE SPEC NAA+PROBE: NORMAL
SHIGELLA DNA SPEC QL NAA+PROBE: NORMAL
SODIUM SERPL-SCNC: 148 MMOL/L (ref 136–145)
WBC # BLD AUTO: 4.74 THOUSAND/UL (ref 4.31–10.16)

## 2021-10-26 PROCEDURE — 80048 BASIC METABOLIC PNL TOTAL CA: CPT | Performed by: INTERNAL MEDICINE

## 2021-10-26 PROCEDURE — 99232 SBSQ HOSP IP/OBS MODERATE 35: CPT | Performed by: INTERNAL MEDICINE

## 2021-10-26 PROCEDURE — 85025 COMPLETE CBC W/AUTO DIFF WBC: CPT | Performed by: INTERNAL MEDICINE

## 2021-10-26 PROCEDURE — 99233 SBSQ HOSP IP/OBS HIGH 50: CPT | Performed by: INTERNAL MEDICINE

## 2021-10-26 RX ORDER — LOPERAMIDE HYDROCHLORIDE 2 MG/1
2 CAPSULE ORAL 3 TIMES DAILY PRN
Status: DISCONTINUED | OUTPATIENT
Start: 2021-10-26 | End: 2021-10-26

## 2021-10-26 RX ORDER — SODIUM CHLORIDE, SODIUM LACTATE, POTASSIUM CHLORIDE, CALCIUM CHLORIDE 600; 310; 30; 20 MG/100ML; MG/100ML; MG/100ML; MG/100ML
75 INJECTION, SOLUTION INTRAVENOUS CONTINUOUS
Status: DISCONTINUED | OUTPATIENT
Start: 2021-10-26 | End: 2021-10-26

## 2021-10-26 RX ADMIN — ANORECTAL OINTMENT: 15.7; .44; 24; 20.6 OINTMENT TOPICAL at 10:30

## 2021-10-26 RX ADMIN — LOSARTAN POTASSIUM 100 MG: 50 TABLET, FILM COATED ORAL at 10:27

## 2021-10-26 RX ADMIN — TOPIRAMATE 100 MG: 100 TABLET, FILM COATED ORAL at 18:45

## 2021-10-26 RX ADMIN — Medication 125 MG: at 18:45

## 2021-10-26 RX ADMIN — Medication 125 MG: at 06:22

## 2021-10-26 RX ADMIN — ANORECTAL OINTMENT: 15.7; .44; 24; 20.6 OINTMENT TOPICAL at 18:47

## 2021-10-26 RX ADMIN — Medication 125 MG: at 00:38

## 2021-10-26 RX ADMIN — DICYCLOMINE HYDROCHLORIDE 20 MG: 20 TABLET ORAL at 13:14

## 2021-10-26 RX ADMIN — Medication 125 MG: at 13:14

## 2021-10-26 RX ADMIN — DICYCLOMINE HYDROCHLORIDE 20 MG: 20 TABLET ORAL at 21:04

## 2021-10-26 RX ADMIN — PAROXETINE 40 MG: 20 TABLET, FILM COATED ORAL at 10:27

## 2021-10-26 RX ADMIN — Medication 125 MG: at 23:01

## 2021-10-26 RX ADMIN — PANTOPRAZOLE SODIUM 40 MG: 40 TABLET, DELAYED RELEASE ORAL at 06:22

## 2021-10-26 RX ADMIN — QUETIAPINE FUMARATE 300 MG: 200 TABLET ORAL at 21:01

## 2021-10-26 RX ADMIN — PANTOPRAZOLE SODIUM 40 MG: 40 TABLET, DELAYED RELEASE ORAL at 18:48

## 2021-10-26 RX ADMIN — TOPIRAMATE 100 MG: 100 TABLET, FILM COATED ORAL at 10:28

## 2021-10-26 NOTE — PLAN OF CARE
Problem: PAIN - ADULT  Goal: Verbalizes/displays adequate comfort level or baseline comfort level  Description: Interventions:  - Encourage patient to monitor pain and request assistance  - Assess pain using appropriate pain scale  - Administer analgesics based on type and severity of pain and evaluate response  - Implement non-pharmacological measures as appropriate and evaluate response  - Consider cultural and social influences on pain and pain management  - Notify physician/advanced practitioner if interventions unsuccessful or patient reports new pain  Outcome: Progressing     Problem: INFECTION - ADULT  Goal: Absence or prevention of progression during hospitalization  Description: INTERVENTIONS:  - Assess and monitor for signs and symptoms of infection  - Monitor lab/diagnostic results  - Monitor all insertion sites, i e  indwelling lines, tubes, and drains  - Monitor endotracheal if appropriate and nasal secretions for changes in amount and color  - [place] appropriate cooling/warming therapies per order  - Administer medications as ordered  - Instruct and encourage patient and family to use good hand hygiene technique  - Identify and instruct in appropriate isolation precautions for identified infection/condition  Outcome: Progressing

## 2021-10-26 NOTE — ASSESSMENT & PLAN NOTE
CT scan revealed 6 1 cm fluid collection suggesting abscess- but clinically not an abscess  However on physical examination appears to be hematoma- clinically resolving  WBC count within normal limits  Currently afebrile    Surgery input appreciated and no operative intervention

## 2021-10-26 NOTE — ASSESSMENT & PLAN NOTE
Patient reports stool is getting a little bit formed still has up to 15 bowel movements daily  Patient still mild lower abdominal tenderness  No blood noted recently  Has been having significant diarrhea approximately 20-30 bowel movements per day for the past 2-3 weeks  Reports weight loss  Reports mucous and blood in stool yesterday  History of exposure to Augmentin ten-day course 1 month ago    CT scan revealed mild colitis  Stool PCR and antigen a positive for C diff    -- continue vancomycin 125 mg p o  Q 6 hours for 2 weeks  -- stay away from dairy products and lactulose as this can provoke diarrhea

## 2021-10-26 NOTE — UTILIZATION REVIEW
Inpatient Admission Authorization Request   NOTIFICATION OF INPATIENT ADMISSION/INPATIENT AUTHORIZATION REQUEST   SERVICING FACILITY:   [address] [place] [address]  Tax ID: [id number]  NPI: [id number]  Place of [facility]patient [facility]  Place of Service Code: 24     ATTENDING PROVIDER:  Attending Name and NPI#: [place], [place] [[id number]]  Address: [address] [place] [address]  Phone: [phone]     UTILIZATION REVIEW CONTACT:  Juan Pablo Hernandez, Utilization   Network Utilization Review Department  Phone: [phone]  Fax [phone]  Email: [email or web address]     PHYSICIAN ADVISORY SERVICES:  FOR QOOU-ZX-PGIC REVIEW - MEDICAL NECESSITY DENIAL  Phone: [phone]  Fax: [phone]  Email: [email or web address]     TYPE OF REQUEST:  Inpatient Status     ADMISSION INFORMATION:  ADMISSION DATE/TIME: 10/24/21  8:36 PM  PATIENT DIAGNOSIS CODE/DESCRIPTION:  Diarrhea [R19 7]  Abscess [L02 91]  Colitis [K52 9]  Gastroesophageal reflux disease without esophagitis [K21 9]  Traumatic hematoma of buttock [S30  0XXA]  DISCHARGE DATE/TIME: No discharge date for patient encounter  DISCHARGE DISPOSITION (IF DISCHARGED): Home/Self Care     IMPORTANT INFORMATION:  Please contact the Juan Pablo Hernandez directly with any questions or concerns regarding this request  Department voicemails are confidential     Send requests for admission clinical reviews, concurrent reviews, approvals, and administrative denials due to lack of clinical to fax [phone]

## 2021-10-26 NOTE — ASSESSMENT & PLAN NOTE
Continue home ARB    Blood pressure 130/58, pulse 56, temperature 98 1 °F (36 7 °C), resp  rate 18, height 5' 4" (1 626 m), weight 74 4 kg (164 lb), SpO2 96 %

## 2021-10-26 NOTE — CASE MANAGEMENT
Case Management Assessment & Discharge Planning Note    Patient name Angela Pimentel  Location /-94 MRN [id number]  : 1975 Date 10/26/2021       Current Admission Date: 10/24/2021  Current Admission Diagnosis:Colitis   Patient Active Problem List    Diagnosis Date Noted    Hematoma 10/24/2021    Colitis 10/24/2021    Finger infection 2021    Recurrent major depressive disorder, in partial remission ([facility] ) 2020    Dysuria 2020    Gastroesophageal reflux disease without esophagitis 2020    Genital labial ulcer 2020    Bipolar 1 disorder ([facility] ) 2020    [facility]    Essential hypertension 2020    Other fatigue 2020    Tobacco abuse 2020    Mixed hyperlipidemia 2020    Well adult exam 2020      LOS (days): 2  Geometric Mean LOS (GMLOS) (days):   Days to GMLOS:     OBJECTIVE:  PATIENT READMITTED [facility]  of Unplanned Readmission Score: 9         Current admission status: Inpatient       Preferred Pharmacy:   [id number] [facility] [id number] [facility]  [address]  Phone: [phone] Fax: [phone]    Primary Care Provider: Glory Colvin MD    Primary Insurance: [employer] [facility]  Secondary Insurance:     ASSESSMENT:  [address], [address] Representative - Spouse   Primary Phone: [phone] (Mobile)               Advance Directives  Does patient have a [address] [place] [address] Avenue?: No  Was patient offered paperwork?: Yes  Does patient currently have a Health Care decision maker?: Yes, please see Health Care Proxy section  Does patient have Advance Directives?: No  Was patient offered paperwork?: Yes              Patient Information  Admitted from[de-identified] Home  Mental Status: Alert  During Assessment patient was accompanied by: Spouse  Assessment information provided by[de-identified] Patient  Primary Caregiver: Self  Support Systems: Self, Spouse/significant other  [place] of Residence: [place] do you live in?: [address] entry access options  Select all that apply : Stairs  Number of steps to enter home : 5  Do the steps have railings?: Yes  Type of Current Residence: 2 story home  Upon entering residence, is there a bedroom on the main floor (no further steps)?: No  A bedroom is located on the following floor levels of residence (select all that apply):: 2nd Floor  Upon entering residence, is there a bathroom on the main floor (no further steps)?: Yes  Number of steps to 2nd floor from main floor: One Flight  In the last 12 months, was there a time when you were not able to pay the mortgage or rent on time?: Yes (HAS MOVED IN W/   SISTER IN LAW )  In the last 12 months, how many places have you lived?: 2  In the last 12 months, was there a time when you did not have a steady place to sleep or slept in a shelter (including now)?: Yes (HAS MOVED IN W/ SISTER IN LAW)  Homeless/housing insecurity resource given?: Refused  Living Arrangements: Lives w/ Spouse/significant other  Is patient a ?: No    Activities of Daily Living Prior to Admission  Functional Status: Independent  Completes ADLs independently?: Yes  Ambulates independently?: Yes  Does patient use assisted devices?: No  Does patient currently own DME?: No  Does patient have a history of Outpatient Therapy (PT/OT)?: No  Does the patient have a history of Short-Term Rehab?: No  Does patient have a history of HHC?: No  Does patient currently have [facility]?: No         Patient Information Continued  Income Source: Employed  Does patient have prescription coverage?: Yes  Within the past 12 months, you worried that your food would run out before you got the money to buy more : Sometimes true  Within the past 12 months, the food you bought just didnt last and you didnt have money to get more : Sometimes true  Food insecurity resource given?: Refused  Does patient receive dialysis treatments?: No  Does patient have a history of substance abuse?: No  Does patient have a history of Mental Health Diagnosis?: Yes  Is patient receiving treatment for mental health?: No  Patient declined treatment information  Has patient received inpatient treatment related to mental health in the last 2 years?: No         Means of Transportation  Means of Transport to Appts[de-identified] Family transport  In the past 12 months, has lack of transportation kept you from medical appointments or from getting medications?: No  In the past 12 months, has lack of transportation kept you from meetings, work, or from getting things needed for daily living?: No  Was application for public transport provided?: Refused        DISCHARGE DETAILS:    Discharge planning discussed with[de-identified] PATIENT  Freedom of Choice: Yes [facility] )                   Contacts  Patient Contacts: Stuart Alvarez/  Relationship to Patient[de-identified] Family  Contact Method:  In Person  Reason/Outcome: Continuity of Care, Emergency Contact, Discharge [address]         Is the patient interested in [facility] at discharge?: No [facility] )    DME Referral Provided  Referral made for DME?: No ([facility])              Treatment Team Recommendation: Home  Discharge Destination Plan[de-identified] Home  Transport at Discharge : Family ([address] )

## 2021-10-26 NOTE — PROGRESS NOTES
[facility]  Progress Note - [facility] 1975, 48 y o  female MRN: [id number]  Unit/Bed#: -Fannie Encounter: [id number]  Primary Care Provider: Tessa Gomez MD   Date and time admitted to hospital: 10/24/2021  4:29 PM    * C  difficile colitis  Assessment & Plan  Has been having significant diarrhea approximately 20-30 bowel movements per day for the past 2-3 weeks  Reports weight loss  Reports mucous and blood in stool yesterday  History of exposure to Augmentin 1 ten-day course month ago  CT scan revealed mild colitis  Stool PCR and antigen a positive for C diff    -- continue vancomycin 125 mg p o  Q 6 hours  -- patient has good oral intake DC fluid  -- GI is on board, colonoscopy is deferred for now  -- diet is advanced to regular house  -- hold of Imodium in patient with CD  -- stay away from lactulose as this can provoke diarrhea    Hematoma  Assessment & Plan  CT scan revealed 6 1 cm fluid collection suggesting abscess  However on physical examination appears to be hematoma  WBC count within normal limits  Currently afebrile    -- Will monitor off antibiotics for now  -- Emergency department discussed with surgery who will see in consultation        Mixed hyperlipidemia  Assessment & Plan  Continue colestipol    Essential hypertension  Assessment & Plan  Continue home ARB  BP currently controlled    Bipolar 1 disorder (HCC)  Assessment & Plan  Continue home Seroquel, topiramate    Gastroesophageal reflux disease without esophagitis  Assessment & Plan  Continue home Protonix      VTE Pharmacologic Prophylaxis:   VTE Score: 2 Low Risk (Score 0-2) - Encourage Ambulation  Mechanical VTE Prophylaxis in Place: Yes    Patient Centered Rounds: I have performed bedside rounds with nursing staff today  Discussions with Specialists or Other Care Team Provider:  GI    Education and Discussions with Family / Patient: Updated  () via phone    Plan    Current Length of Stay: 2 day(s)    Current Patient Status: Inpatient     Discharge Plan / Estimated Discharge Date: Anticipate discharge tomorrow to home  Code Status: Level 1 - Full Code      Subjective:   Patient was seen and examined at bedside  She reports she still has frequent bowel movements and is sometimes incontinent to bowels  She reports her abdominal pain has improved, she denies blood or mucus in stool lately  Patient denies nausea/vomiting, chest pain, shortness of breath, diaphoresis, rashes anywhere  Spoke to family at bedside all questions answered  Objective:     Vitals:   Temp (24hrs), Av 3 °F (36 8 °C), Min:97 7 °F (36 5 °C), Max:98 6 °F (37 °C)    Temp:  [97 7 °F (36 5 °C)-98 6 °F (37 °C)] 98 6 °F (37 °C)  HR:  [48-60] 52  Resp:  [16-18] 18  BP: (112-158)/(60-78) 112/60  SpO2:  [96 %-99 %] 97 %  Body mass index is 28 15 kg/m²  Input and Output Summary (last 24 hours): Intake/Output Summary (Last 24 hours) at 10/26/2021 1333  Last data filed at 10/25/2021 1830  Gross per 24 hour   Intake 240 ml   Output --   Net 240 ml       Physical Exam:     Physical Exam  Vitals and nursing note reviewed  Constitutional:       General: She is not in acute distress  Appearance: She is well-developed  She is not ill-appearing or toxic-appearing  HENT:      Head: Normocephalic and atraumatic  Eyes:      Conjunctiva/sclera: Conjunctivae normal    Cardiovascular:      Rate and Rhythm: Normal rate and regular rhythm  Heart sounds: No murmur heard  Pulmonary:      Effort: Pulmonary effort is normal  No respiratory distress  Breath sounds: Normal breath sounds  Abdominal:      Palpations: Abdomen is soft  Tenderness: There is no abdominal tenderness  Musculoskeletal:      Cervical back: Neck supple  Skin:     General: Skin is warm and dry  Neurological:      Mental Status: She is alert           Additional Data:     Labs:  Results from last 7 days   Lab Units 10/26/21  0848   WBC Thousand/uL 4 74   HEMOGLOBIN g/dL 11 8   HEMATOCRIT % 35 6   PLATELETS Thousands/uL 172   NEUTROS PCT % 45   LYMPHS PCT % 36   MONOS PCT % 13*   EOS PCT % 5     Results from last 7 days   Lab Units 10/26/21  0848 10/24/21  1802   SODIUM mmol/L 148* 142   POTASSIUM mmol/L 4 1 3 6   CHLORIDE mmol/L 115* 105   CO2 mmol/L 23 24   BUN mg/dL 15 15   CREATININE mg/dL 0 81 0 80   ANION GAP mmol/L 10 13   CALCIUM mg/dL 8 7 9 3   ALBUMIN g/dL  --  4 1   TOTAL BILIRUBIN mg/dL  --  0 43   ALK PHOS U/L  --  112   ALT U/L  --  56   AST U/L  --  35   GLUCOSE RANDOM mg/dL 164* 94                 Results from last 7 days   Lab Units 10/24/21  1802   LACTIC ACID mmol/L 0 9       Imaging: Reviewed radiology reports from this admission including: abdominal/pelvic CT scan    Recent Cultures (last 7 days):     Results from last 7 days   Lab Units 10/24/21  1919   C DIFF TOXIN B BY PCR  Positive*       Lines/Drains:  Invasive Devices     None                 Telemetry:        Last 24 Hours Medication List:   Current Facility-Administered Medications   Medication Dose Route Frequency Provider Last Rate    acetaminophen  650 mg Oral Q6H PRN Haroon Raymond MD      busPIRone  10 mg Oral BID PRN Haroon Raymond MD      dicyclomine  20 mg Oral Q6H PRN Haroon Raymond MD      furosemide  40 mg Oral Daily PRN Haroon Raymond MD      losartan  100 mg Oral Daily Haroon Raymond MD      menthol-zinc oxide   Topical BID Alban Trujillo PA-C      nicotine  1 patch Transdermal Daily Haroon Raymond MD      pantoprazole  40 mg Oral BID Haroon Raymond MD      PARoxetine  40 mg Oral QAM Haroon Raymond MD      QUEtiapine  300 mg Oral HS Haroon Raymond MD      topiramate  100 mg Oral BID Haroon Raymond MD      vancomycin  125 mg Oral Q6H [address] Pari Crowe PA-C          Today, Patient Was Seen By: Abundio Samson MD    ** Please Note: This note has been constructed using a voice recognition system   **

## 2021-10-26 NOTE — PROGRESS NOTES
GI Progress Note - Fernando Alvarez 48 y o  female MRN: [id number]    Unit/Bed#: -Fannie Encounter: [id number]    Subjective: She reports no significant improvement in her diarrhea yet  Family at bedside, all questions answered  Objective:     Vitals: Blood pressure 112/60, pulse (!) 52, temperature 98 6 °F (37 °C), temperature source Oral, resp  rate 18, height 5' 4" (1 626 m), weight 74 4 kg (164 lb), SpO2 97 %  ,Body mass index is 28 15 kg/m²  Intake/Output Summary (Last 24 hours) at 10/26/2021 1045  Last data filed at 10/25/2021 1830  Gross per 24 hour   Intake 240 ml   Output --   Net 240 ml       Physical Exam:     General Appearance: Alert, appears stated age and cooperative  Lungs: Clear to auscultation bilaterally, no rales or rhonchi, no labored breathing/accessory muscle use  Heart: Regular rate and rhythm, S1, S2 normal, no murmur, click, rub or gallop  Abdomen: Soft, non-tender, non-distended; bowel sounds normal; no masses or no organomegaly  Extremities: No cyanosis, edema    Invasive Devices     Peripheral Intravenous Line            Peripheral IV 10/24/21 Right Antecubital 1 day                Lab Results:  Results from last 7 days   Lab Units 10/26/21  0848   WBC Thousand/uL 4 74   HEMOGLOBIN g/dL 11 8   HEMATOCRIT % 35 6   PLATELETS Thousands/uL 172   NEUTROS PCT % 45   LYMPHS PCT % 36   MONOS PCT % 13*   EOS PCT % 5     Results from last 7 days   Lab Units 10/26/21  0848 10/24/21  1802   POTASSIUM mmol/L 4 1 3 6   CHLORIDE mmol/L 115* 105   CO2 mmol/L 23 24   BUN mg/dL 15 15   CREATININE mg/dL 0 81 0 80   CALCIUM mg/dL 8 7 9 3   ALK PHOS U/L  --  112   ALT U/L  --  56   AST U/L  --  35         Results from last 7 days   Lab Units 10/24/21  1802   LIPASE u/L 62*       Imaging Studies: I have personally reviewed pertinent imaging studies  CT abdomen pelvis with contrast  Result Date: 10/24/2021  Impression: 1    Liquid stool throughout the colon with mild sigmoid colonic wall thickening, suggesting the possibility of mild colitis  2   Approximately 6 1 cm fluid collection with thin rim enhancement in the right upper buttock subcutaneous fat, suggesting abscess  Possible tract to the right medial buttock skin  Recommend direct inspection  3  Hepatomegaly with severe hepatic steatosis  The study was marked in [facility] for immediate notification  Workstation performed: [id number]       Assessment and Plan:     C  Diff Colitis  - She presented with significant diarrhea 20-30 times daily for about 3 weeks associated with tenesmus, lower abdominal cramping, scant blood, and mucous after receiving antibiotics about 1 month ago  - C   Diff positive  - Continue vanco 125 mg q6h x 14 days  - Do not give imodium while treating Cdiff  - Diet as tolerated; discussed caution with lactose/dairy as this can often worsen diarrhea  - Monitor and replete electrolytes as needed  - Serial abdominal exams  - No indication for endoscopic evaluation inpatient    The patient will be seen by Dr Johana Hernandez

## 2021-10-27 DIAGNOSIS — I10 ESSENTIAL HYPERTENSION: ICD-10-CM

## 2021-10-27 LAB
ANION GAP SERPL CALCULATED.3IONS-SCNC: 11 MMOL/L (ref 4–13)
BASOPHILS # BLD AUTO: 0.04 THOUSANDS/ΜL (ref 0–0.1)
BASOPHILS NFR BLD AUTO: 1 % (ref 0–1)
BUN SERPL-MCNC: 16 MG/DL (ref 5–25)
CALCIUM SERPL-MCNC: 8.5 MG/DL (ref 8.3–10.1)
CALPROTECTIN STL-MCNT: 1043 UG/G (ref 0–120)
CHLORIDE SERPL-SCNC: 112 MMOL/L (ref 100–108)
CO2 SERPL-SCNC: 22 MMOL/L (ref 21–32)
CREAT SERPL-MCNC: 0.76 MG/DL (ref 0.6–1.3)
EOSINOPHIL # BLD AUTO: 0.21 THOUSAND/ΜL (ref 0–0.61)
EOSINOPHIL NFR BLD AUTO: 4 % (ref 0–6)
ERYTHROCYTE [DISTWIDTH] IN BLOOD BY AUTOMATED COUNT: 12.7 % (ref 11.6–15.1)
GFR SERPL CREATININE-BSD FRML MDRD: 93 ML/MIN/1.73SQ M
GLUCOSE SERPL-MCNC: 117 MG/DL (ref 65–140)
HCT VFR BLD AUTO: 37.2 % (ref 34.8–46.1)
HGB BLD-MCNC: 12.4 G/DL (ref 11.5–15.4)
IMM GRANULOCYTES # BLD AUTO: 0.03 THOUSAND/UL (ref 0–0.2)
IMM GRANULOCYTES NFR BLD AUTO: 1 % (ref 0–2)
LYMPHOCYTES # BLD AUTO: 1.9 THOUSANDS/ΜL (ref 0.6–4.47)
LYMPHOCYTES NFR BLD AUTO: 39 % (ref 14–44)
MCH RBC QN AUTO: 29.2 PG (ref 26.8–34.3)
MCHC RBC AUTO-ENTMCNC: 33.3 G/DL (ref 31.4–37.4)
MCV RBC AUTO: 88 FL (ref 82–98)
MONOCYTES # BLD AUTO: 0.68 THOUSAND/ΜL (ref 0.17–1.22)
MONOCYTES NFR BLD AUTO: 14 % (ref 4–12)
NEUTROPHILS # BLD AUTO: 2.03 THOUSANDS/ΜL (ref 1.85–7.62)
NEUTS SEG NFR BLD AUTO: 41 % (ref 43–75)
NRBC BLD AUTO-RTO: 0 /100 WBCS
PLATELET # BLD AUTO: 186 THOUSANDS/UL (ref 149–390)
PMV BLD AUTO: 9.8 FL (ref 8.9–12.7)
POTASSIUM SERPL-SCNC: 3.7 MMOL/L (ref 3.5–5.3)
RBC # BLD AUTO: 4.24 MILLION/UL (ref 3.81–5.12)
SODIUM SERPL-SCNC: 145 MMOL/L (ref 136–145)
WBC # BLD AUTO: 4.89 THOUSAND/UL (ref 4.31–10.16)

## 2021-10-27 PROCEDURE — 85025 COMPLETE CBC W/AUTO DIFF WBC: CPT | Performed by: INTERNAL MEDICINE

## 2021-10-27 PROCEDURE — 99232 SBSQ HOSP IP/OBS MODERATE 35: CPT | Performed by: INTERNAL MEDICINE

## 2021-10-27 PROCEDURE — 80048 BASIC METABOLIC PNL TOTAL CA: CPT | Performed by: INTERNAL MEDICINE

## 2021-10-27 RX ORDER — VANCOMYCIN HYDROCHLORIDE 125 MG/1
125 CAPSULE ORAL 4 TIMES DAILY
Qty: 36 CAPSULE | Refills: 0 | OUTPATIENT
Start: 2021-10-27 | End: 2021-11-05

## 2021-10-27 RX ORDER — DICYCLOMINE HCL 20 MG
20 TABLET ORAL
Status: DISCONTINUED | OUTPATIENT
Start: 2021-10-27 | End: 2021-10-28 | Stop reason: HOSPADM

## 2021-10-27 RX ORDER — VALSARTAN 320 MG/1
TABLET ORAL
Qty: 30 TABLET | Refills: 1 | Status: SHIPPED | OUTPATIENT
Start: 2021-10-27

## 2021-10-27 RX ORDER — POTASSIUM CHLORIDE 20 MEQ/1
20 TABLET, EXTENDED RELEASE ORAL DAILY
Qty: 5 TABLET | Refills: 0 | OUTPATIENT
Start: 2021-10-27

## 2021-10-27 RX ORDER — NICOTINE 21 MG/24HR
1 PATCH, TRANSDERMAL 24 HOURS TRANSDERMAL DAILY
Qty: 28 PATCH | Refills: 0 | OUTPATIENT
Start: 2021-10-28

## 2021-10-27 RX ADMIN — ANORECTAL OINTMENT: 15.7; .44; 24; 20.6 OINTMENT TOPICAL at 17:44

## 2021-10-27 RX ADMIN — DICYCLOMINE HYDROCHLORIDE 20 MG: 20 TABLET ORAL at 22:35

## 2021-10-27 RX ADMIN — Medication 125 MG: at 05:27

## 2021-10-27 RX ADMIN — PAROXETINE 40 MG: 20 TABLET, FILM COATED ORAL at 08:47

## 2021-10-27 RX ADMIN — TOPIRAMATE 100 MG: 100 TABLET, FILM COATED ORAL at 17:44

## 2021-10-27 RX ADMIN — DICYCLOMINE HYDROCHLORIDE 20 MG: 20 TABLET ORAL at 11:10

## 2021-10-27 RX ADMIN — ANORECTAL OINTMENT: 15.7; .44; 24; 20.6 OINTMENT TOPICAL at 08:49

## 2021-10-27 RX ADMIN — LOSARTAN POTASSIUM 100 MG: 50 TABLET, FILM COATED ORAL at 08:47

## 2021-10-27 RX ADMIN — PANTOPRAZOLE SODIUM 40 MG: 40 TABLET, DELAYED RELEASE ORAL at 05:27

## 2021-10-27 RX ADMIN — DICYCLOMINE HYDROCHLORIDE 20 MG: 20 TABLET ORAL at 16:34

## 2021-10-27 RX ADMIN — PANTOPRAZOLE SODIUM 40 MG: 40 TABLET, DELAYED RELEASE ORAL at 16:34

## 2021-10-27 RX ADMIN — Medication 125 MG: at 23:38

## 2021-10-27 RX ADMIN — QUETIAPINE FUMARATE 200 MG: 200 TABLET ORAL at 22:33

## 2021-10-27 RX ADMIN — Medication 125 MG: at 17:45

## 2021-10-27 RX ADMIN — TOPIRAMATE 100 MG: 100 TABLET, FILM COATED ORAL at 08:47

## 2021-10-27 RX ADMIN — Medication 125 MG: at 11:11

## 2021-10-27 NOTE — PROGRESS NOTES
[facility]  Progress Note - [address] [facility] 1975, 48 y o  female MRN: [id number]  Unit/Bed#: -Fannie Encounter: [id number]  Primary Care Provider: Elmer Randolph MD   Date and time admitted to hospital: 10/24/2021  4:29 PM    * C  difficile colitis  Assessment & Plan  Patient reports stool is getting a little bit formed still has up to 15 bowel movements daily  Patient still mild lower abdominal tenderness  No blood noted recently  Has been having significant diarrhea approximately 20-30 bowel movements per day for the past 2-3 weeks  Reports weight loss  Reports mucous and blood in stool yesterday  History of exposure to Augmentin ten-day course 1 month ago  CT scan revealed mild colitis  Stool PCR and antigen a positive for C diff    -- continue vancomycin 125 mg p o  Q 6 hours  -- patient has good oral intake DC fluid  -- GI is on board, colonoscopy is not planned  -- diet is advanced to regular house  -- hold of Imodium in patient with CD  -- stay away from dairy products and lactulose as this can provoke diarrhea    Hematoma  Assessment & Plan  CT scan revealed 6 1 cm fluid collection suggesting abscess  However on physical examination appears to be hematoma  WBC count within normal limits  Currently afebrile    -- Will monitor off antibiotics for now  -- Emergency department discussed with surgery who will see in consultation        Mixed hyperlipidemia  Assessment & Plan  Continue colestipol    Essential hypertension  Assessment & Plan  Continue home ARB  BP currently controlled    Bipolar 1 disorder (HCC)  Assessment & Plan  Continue home Seroquel, topiramate    Gastroesophageal reflux disease without esophagitis  Assessment & Plan  Continue home Protonix      VTE Pharmacologic Prophylaxis:   VTE Score: 2 Low Risk (Score 0-2) - Encourage Ambulation      Mechanical VTE Prophylaxis in Place: Yes    Patient Centered Rounds: I have performed bedside rounds with nursing staff today     Discussions with Specialists or Other Care Team Provider:  GI    Education and Discussions with Family / Patient: Updated  () via phone  Plan    Current Length of Stay: 3 day(s)    Current Patient Status: Inpatient     Discharge Plan / Estimated Discharge Date: Anticipate discharge tomorrow to home  Code Status: Level 1 - Full Code      Subjective:   Patient was seen and examined at bedside  She reports she still has frequent bowel movements up to 15 times a day and is sometimes incontinent to bowels  She reports her abdominal pain has improved she only has mild abdominal tenderness, she denies blood or mucus in stool lately  Patient denies nausea/vomiting, chest pain, shortness of breath, diaphoresis, rashes anywhere  Objective:     Vitals:   Temp (24hrs), Av °F (36 7 °C), Min:97 9 °F (36 6 °C), Max:98 °F (36 7 °C)    Temp:  [97 9 °F (36 6 °C)-98 °F (36 7 °C)] 98 °F (36 7 °C)  HR:  [56] 56  Resp:  [18-20] 18  BP: (112-147)/(49-78) 112/49  SpO2:  [96 %] 96 %  Body mass index is 28 15 kg/m²  Input and Output Summary (last 24 hours): Intake/Output Summary (Last 24 hours) at 10/27/2021 1230  Last data filed at 10/26/2021 1950  Gross per 24 hour   Intake 420 ml   Output --   Net 420 ml       Physical Exam:     Physical Exam  Vitals and nursing note reviewed  Constitutional:       General: She is not in acute distress  Appearance: She is well-developed  She is not ill-appearing or toxic-appearing  HENT:      Head: Normocephalic and atraumatic  Nose: No congestion  Eyes:      Conjunctiva/sclera: Conjunctivae normal    Cardiovascular:      Rate and Rhythm: Normal rate and regular rhythm  Heart sounds: No murmur heard  Pulmonary:      Effort: Pulmonary effort is normal  No respiratory distress  Breath sounds: Normal breath sounds  Abdominal:      General: There is no distension  Palpations: Abdomen is soft  Tenderness:  There is abdominal tenderness  There is no right CVA tenderness, left CVA tenderness, guarding or rebound  Musculoskeletal:      Cervical back: Neck supple  Skin:     General: Skin is warm and dry  Neurological:      Mental Status: She is alert and oriented to person, place, and time           Additional Data:     Labs:  Results from last 7 days   Lab Units 10/27/21  0529   WBC Thousand/uL 4 89   HEMOGLOBIN g/dL 12 4   HEMATOCRIT % 37 2   PLATELETS Thousands/uL 186   NEUTROS PCT % 41*   LYMPHS PCT % 39   MONOS PCT % 14*   EOS PCT % 4     Results from last 7 days   Lab Units 10/27/21  0529 10/24/21  1802   SODIUM mmol/L 145 142   POTASSIUM mmol/L 3 7 3 6   CHLORIDE mmol/L 112* 105   CO2 mmol/L 22 24   BUN mg/dL 16 15   CREATININE mg/dL 0 76 0 80   ANION GAP mmol/L 11 13   CALCIUM mg/dL 8 5 9 3   ALBUMIN g/dL  --  4 1   TOTAL BILIRUBIN mg/dL  --  0 43   ALK PHOS U/L  --  112   ALT U/L  --  56   AST U/L  --  35   GLUCOSE RANDOM mg/dL 117 94                 Results from last 7 days   Lab Units 10/24/21  1802   LACTIC ACID mmol/L 0 9       Imaging: Reviewed radiology reports from this admission including: abdominal/pelvic CT scan    Recent Cultures (last 7 days):     Results from last 7 days   Lab Units 10/24/21  1919   C DIFF TOXIN B BY PCR  Positive*       Lines/Drains:  Invasive Devices     None                 Telemetry:        Last 24 Hours Medication List:   Current Facility-Administered Medications   Medication Dose Route Frequency Provider Last Rate    acetaminophen  650 mg Oral Q6H PRN Haroon Raymond MD      busPIRone  10 mg Oral BID PRN Haroon Raymond MD      dicyclomine  20 mg Oral Q6H PRN Haroon Raymond MD      furosemide  40 mg Oral Daily PRN Haroon Raymond MD      losartan  100 mg Oral Daily Sierra Turcios MD      menthol-zinc oxide   Topical BID Lucretia Choudhary PA-C      nicotine  1 patch Transdermal Daily Haroon Raymond MD      pantoprazole  40 mg Oral BID Haroon Raymond MD      PARoxetine  40 mg Oral QAM Haylie Brewer MD      QUEtiapine  300 mg Oral HS Haroon Raymond MD      topiramate  100 mg Oral BID Haylie Brewer MD      vancomycin  125 mg Oral Q6H [address] Pari Crowe PA-C          Today, Patient Was Seen By: Abundio Samson MD    ** Please Note: This note has been constructed using a voice recognition system   **

## 2021-10-27 NOTE — PROGRESS NOTES
GI Progress Note - Dale Alvarez 48 y o  female MRN: [id number]    Unit/Bed#: -01 Encounter: [id number]    Subjective: Dale Kirby reports reduction in frequency as well as improving consistency of the diarrhea, it is less watery  She is having lower abdominal cramping  Tolerating PO well otherwise  Objective:     Vitals: Blood pressure (!) 112/49, pulse 56, temperature 98 °F (36 7 °C), resp  rate 18, height 5' 4" (1 626 m), weight 74 4 kg (164 lb), SpO2 96 %  ,Body mass index is 28 15 kg/m²  Intake/Output Summary (Last 24 hours) at 10/27/2021 1356  Last data filed at 10/26/2021 1950  Gross per 24 hour   Intake 420 ml   Output --   Net 420 ml       Physical Exam:     General Appearance: Alert, appears stated age and cooperative  Lungs: Clear to auscultation bilaterally, no rales or rhonchi, no labored breathing/accessory muscle use  Heart: Regular rate and rhythm, S1, S2 normal, no murmur, click, rub or gallop  Abdomen: Non-distended, soft, BS active, (+) mild TTP in bilateral lower quadrants  Extremities: No cyanosis or LE edema    Invasive Devices     None                 Lab Results:  Results from last 7 days   Lab Units 10/27/21  0529   WBC Thousand/uL 4 89   HEMOGLOBIN g/dL 12 4   HEMATOCRIT % 37 2   PLATELETS Thousands/uL 186   NEUTROS PCT % 41*   LYMPHS PCT % 39   MONOS PCT % 14*   EOS PCT % 4     Results from last 7 days   Lab Units 10/27/21  0529 10/24/21  1802   POTASSIUM mmol/L 3 7 3 6   CHLORIDE mmol/L 112* 105   CO2 mmol/L 22 24   BUN mg/dL 16 15   CREATININE mg/dL 0 76 0 80   CALCIUM mg/dL 8 5 9 3   ALK PHOS U/L  --  112   ALT U/L  --  56   AST U/L  --  35         Results from last 7 days   Lab Units 10/24/21  1802   LIPASE u/L 62*       Imaging Studies: I have personally reviewed pertinent imaging studies  CT abdomen pelvis with contrast  Result Date: 10/24/2021  Impression: 1    Liquid stool throughout the colon with mild sigmoid colonic wall thickening, suggesting the possibility of mild colitis  2   Approximately 6 1 cm fluid collection with thin rim enhancement in the right upper buttock subcutaneous fat, suggesting abscess  Possible tract to the right medial buttock skin  Recommend direct inspection  3  Hepatomegaly with severe hepatic steatosis  The study was marked in [facility] for immediate notification  Assessment and Plan:     C  Diff Colitis  - She presented with significant diarrhea 20-30 times daily for about 3 weeks associated with tenesmus, lower abdominal cramping, scant blood, and mucous after receiving antibiotics about 1 month ago  - C  Diff positive  - Continue vanco 125 mg q6h x 14 days total  - No antidiarrheals while treating Cdiff  - Clinically improving with 48 hours of vanco  - Diet as tolerated; discuss caution with dairy and high fiber foods  - Counseled on hygiene and how to clean surfaces on discharge to prevent spread to others at home  - We will arrange outpatient follow up      The patient will be seen by Dr Maria Kawasaki   GI will sign off

## 2021-10-27 NOTE — PLAN OF CARE
Problem: Potential for Falls  Goal: Patient will remain free of falls  Description: INTERVENTIONS:  - Educate patient/family on patient safety including physical limitations  - Instruct patient to call for assistance with activity   - Consult OT/PT to assist with strengthening/mobility   - Keep Call bell within reach  - Keep bed low and locked with side rails adjusted as appropriate  - Keep care items and personal belongings within reach  - Initiate and maintain comfort rounds  - Make Fall Risk Sign visible to staff  - Offer Toileting every  Hours, in advance of need  - Initiate/Maintain alarm  - Obtain necessary fall risk management equipment:   - Apply yellow socks and bracelet for high fall risk patients  - Consider moving patient to room near nurses station  Outcome: Progressing     Problem: PAIN - ADULT  Goal: Verbalizes/displays adequate comfort level or baseline comfort level  Description: Interventions:  - Encourage patient to monitor pain and request assistance  - Assess pain using appropriate pain scale  - Administer analgesics based on type and severity of pain and evaluate response  - Implement non-pharmacological measures as appropriate and evaluate response  - Consider cultural and social influences on pain and pain management  - Notify physician/advanced practitioner if interventions unsuccessful or patient reports new pain  Outcome: Progressing     Problem: INFECTION - ADULT  Goal: Absence or prevention of progression during hospitalization  Description: INTERVENTIONS:  - Assess and monitor for signs and symptoms of infection  - Monitor lab/diagnostic results  - Monitor all insertion sites, i e  indwelling lines, tubes, and drains  - Monitor endotracheal if appropriate and nasal secretions for changes in amount and color  - [place] appropriate cooling/warming therapies per order  - Administer medications as ordered  - Instruct and encourage patient and family to use good hand hygiene technique  - Identify and instruct in appropriate isolation precautions for identified infection/condition  Outcome: Progressing  Goal: Absence of fever/infection during neutropenic period  Description: INTERVENTIONS:  - Monitor WBC    Outcome: Progressing     Problem: SAFETY ADULT  Goal: Maintain or return to baseline ADL function  Description: INTERVENTIONS:  -  Assess patient's ability to carry out ADLs; assess patient's baseline for ADL function and identify physical deficits which impact ability to perform ADLs (bathing, care of mouth/teeth, toileting, grooming, dressing, etc )  - Assess/evaluate cause of self-care deficits   - Assess range of motion  - Assess patient's mobility; develop plan if impaired  - Assess patient's need for assistive devices and provide as appropriate  - Encourage maximum independence but intervene and supervise when necessary  - Involve family in performance of ADLs  - Assess for home care needs following discharge   - Consider OT consult to assist with ADL evaluation and planning for discharge  - Provide patient education as appropriate  Outcome: Progressing  Goal: Maintains/Returns to pre admission functional level  Description: INTERVENTIONS:  - Perform BMAT or MOVE assessment daily    - Set and communicate daily mobility goal to care team and patient/family/caregiver  - Collaborate with rehabilitation services on mobility goals if consulted  - Perform Range of Motion times a day  - Reposition patient every  hours    - Dangle patient times a day  - Stand patient  times a day  - Ambulate patient  times a day  - Out of bed to chair  times a day   - Out of bed for meals  times a day  - Out of bed for toileting  - Record patient progress and toleration of activity level   Outcome: Progressing     Problem: DISCHARGE PLANNING  Goal: Discharge to home or other facility with appropriate resources  Description: INTERVENTIONS:  - Identify barriers to discharge w/patient and caregiver  - Arrange for needed discharge resources and transportation as appropriate  - Identify discharge learning needs (meds, wound care, etc )  - Arrange for interpretive services to assist at discharge as needed  - Refer to Case Management Department for coordinating discharge planning if the patient needs post-hospital services based on physician/advanced practitioner order or complex needs related to functional status, cognitive ability, or social support system  Outcome: Progressing

## 2021-10-27 NOTE — DISCHARGE INSTR - AVS FIRST PAGE
DISCHARGE INSTRUCTIONS   1  Follow up with Dr David Javier MD in one week  in regards to recent hospitalization  Follow up with Gastroenterology after discharge    Labs:-  Please complete CBC, BMP in 1 week    2  Take medications regularly     New medication:-  Vancomycin 1 tablet every 6 hours for 8 more days including today  Potassium chloride 1 tablet daily for 5 days    3  Come back to the ER if symptoms recur or worsen   4  Activity as tolerated  5   Diet :  Regular healthy diet

## 2021-10-28 VITALS
TEMPERATURE: 98.1 F | WEIGHT: 164 LBS | HEART RATE: 56 BPM | BODY MASS INDEX: 28 KG/M2 | RESPIRATION RATE: 18 BRPM | SYSTOLIC BLOOD PRESSURE: 130 MMHG | DIASTOLIC BLOOD PRESSURE: 58 MMHG | OXYGEN SATURATION: 96 % | HEIGHT: 64 IN

## 2021-10-28 LAB
ELASTASE PANC STL-MCNT: >500 UG ELAST./G
O+P STL CONC: NORMAL

## 2021-10-28 PROCEDURE — 99239 HOSP IP/OBS DSCHRG MGMT >30: CPT | Performed by: INTERNAL MEDICINE

## 2021-10-28 RX ORDER — ACETAMINOPHEN 325 MG/1
650 TABLET ORAL EVERY 6 HOURS PRN
Refills: 0
Start: 2021-10-28 | End: 2022-10-24

## 2021-10-28 RX ORDER — POTASSIUM CHLORIDE 20 MEQ/1
20 TABLET, EXTENDED RELEASE ORAL DAILY
Qty: 7 TABLET | Refills: 0 | Status: SHIPPED | OUTPATIENT
Start: 2021-10-28 | End: 2022-10-24

## 2021-10-28 RX ADMIN — Medication 125 MG: at 11:17

## 2021-10-28 RX ADMIN — LOSARTAN POTASSIUM 100 MG: 50 TABLET, FILM COATED ORAL at 09:19

## 2021-10-28 RX ADMIN — Medication 125 MG: at 06:12

## 2021-10-28 RX ADMIN — DICYCLOMINE HYDROCHLORIDE 20 MG: 20 TABLET ORAL at 11:17

## 2021-10-28 RX ADMIN — PAROXETINE 40 MG: 20 TABLET, FILM COATED ORAL at 09:19

## 2021-10-28 RX ADMIN — ANORECTAL OINTMENT: 15.7; .44; 24; 20.6 OINTMENT TOPICAL at 09:19

## 2021-10-28 RX ADMIN — TOPIRAMATE 100 MG: 100 TABLET, FILM COATED ORAL at 09:19

## 2021-10-28 RX ADMIN — PANTOPRAZOLE SODIUM 40 MG: 40 TABLET, DELAYED RELEASE ORAL at 06:12

## 2021-10-28 RX ADMIN — DICYCLOMINE HYDROCHLORIDE 20 MG: 20 TABLET ORAL at 06:12

## 2021-10-28 NOTE — DISCHARGE SUMMARY
[address] [facility]  Discharge- [address] [place] [address] 1975, 48 y o  female MRN: [id number]  Unit/Bed#: MS [facility]-Fannie Encounter: [id number]  Primary Care Provider: Estefania Moniqeu MD   Date and time admitted to hospital: 10/24/2021  4:29 PM    * C  difficile colitis  Assessment & Plan  Patient reports stool is getting a little bit formed still has up to 15 bowel movements daily  Patient still mild lower abdominal tenderness  No blood noted recently  Has been having significant diarrhea approximately 20-30 bowel movements per day for the past 2-3 weeks  Reports weight loss  Reports mucous and blood in stool yesterday  History of exposure to Augmentin ten-day course 1 month ago  CT scan revealed mild colitis  Stool PCR and antigen a positive for C diff    -- continue vancomycin 125 mg p o  Q 6 hours for 2 weeks  -- stay away from dairy products and lactulose as this can provoke diarrhea    Hematoma  Assessment & Plan  CT scan revealed 6 1 cm fluid collection suggesting abscess- but clinically not an abscess  However on physical examination appears to be hematoma- clinically resolving  WBC count within normal limits  Currently afebrile  Surgery input appreciated and no operative intervention      Mixed hyperlipidemia  Assessment & Plan  Continue colestipol  Essential hypertension  Assessment & Plan  Continue home ARB    Blood pressure 130/58, pulse 56, temperature 98 1 °F (36 7 °C), resp  rate 18, height 5' 4" (1 626 m), weight 74 4 kg (164 lb), SpO2 96 %  Bipolar 1 disorder ([facility] [address] )  Assessment & Plan  Continue home Seroquel and topiramate     Gastroesophageal reflux disease without esophagitis  Assessment & Plan  Continue home Protonix        Admitting Provider:  Michelle Neil MD  Discharge Provider:  Marco Olivas MD  Admission Date: 10/24/2021       Discharge Date: 10/28/21   LOS: 4  Primary Care Physician at Discharge: Estefania Monique MD [facility] 137:  [address] [facility] is a 48 y o  female who presented with diarrhea and C diff colitis  Patient did also have hematoma in his gluteal region that clinically was resolving  Patient was treated with oral vancomycin and improved  GI and surgery saw the patient during the hospital course  Patient did have just 1 loose bowel movement on the day of discharge and is clinically much better  Afebrile and hemodynamically stable    REASON FOR ADMISSION/ ADMISSION DIAGNOSES    Diarrhea possible C diff colitis    DISCHARGE DIAGNOSES  * C  difficile colitis  Assessment & Plan  Patient reports stool is getting a little bit formed still has up to 15 bowel movements daily  Patient still mild lower abdominal tenderness  No blood noted recently  Has been having significant diarrhea approximately 20-30 bowel movements per day for the past 2-3 weeks  Reports weight loss  Reports mucous and blood in stool yesterday  History of exposure to Augmentin ten-day course 1 month ago  CT scan revealed mild colitis  Stool PCR and antigen a positive for C diff    -- continue vancomycin 125 mg p o  Q 6 hours for 2 weeks  -- stay away from dairy products and lactulose as this can provoke diarrhea    Hematoma  Assessment & Plan  CT scan revealed 6 1 cm fluid collection suggesting abscess- but clinically not an abscess  However on physical examination appears to be hematoma- clinically resolving  WBC count within normal limits  Currently afebrile  Surgery input appreciated and no operative intervention      Mixed hyperlipidemia  Assessment & Plan  Continue colestipol  Essential hypertension  Assessment & Plan  Continue home ARB    Blood pressure 130/58, pulse 56, temperature 98 1 °F (36 7 °C), resp  rate 18, height 5' 4" (1 626 m), weight 74 4 kg (164 lb), SpO2 96 %          Bipolar 1 disorder ([address] )  Assessment & Plan  Continue home Seroquel and topiramate     Gastroesophageal reflux disease without esophagitis  Assessment & Plan  Continue home Protonix  CONSULTING PROVIDERS   IP CONSULT TO GASTROENTEROLOGY  IP CONSULT TO ACUTE CARE SURGERY    PROCEDURES PERFORMED  * No surgery found *    RADIOLOGY RESULTS  CT abdomen pelvis with contrast    Result Date: 10/24/2021  Impression: 1  Liquid stool throughout the colon with mild sigmoid colonic wall thickening, suggesting the possibility of mild colitis  2   Approximately 6 1 cm fluid collection with thin rim enhancement in the right upper buttock subcutaneous fat, suggesting abscess  Possible tract to the right medial buttock skin  Recommend direct inspection  3  Hepatomegaly with severe hepatic steatosis  The study was marked in [facility] for immediate notification   Workstation performed: [id number]       LABS  Results from last 7 days   Lab Units 10/27/21  0529 10/26/21  0848 10/25/21  0531 10/24/21  1802   WBC Thousand/uL 4 89 4 74 5 79 9 36   HEMOGLOBIN g/dL 12 4 11 8 12 0 14 1   HEMATOCRIT % 37 2 35 6 35 3 41 5   MCV fL 88 88 88 87   PLATELETS Thousands/uL 186 172 177 241     Results from last 7 days   Lab Units 10/27/21  0529 10/26/21  0848 10/25/21  0531 10/24/21  1802   SODIUM mmol/L 145 148* 146* 142   POTASSIUM mmol/L 3 7 4 1 3 1* 3 6   CHLORIDE mmol/L 112* 115* 112* 105   CO2 mmol/L 22 23 25 24   BUN mg/dL 16 15 14 15   CREATININE mg/dL 0 76 0 81 0 77 0 80   CALCIUM mg/dL 8 5 8 7 8 4 9 3   ALBUMIN g/dL  --   --   --  4 1   TOTAL BILIRUBIN mg/dL  --   --   --  0 43   ALK PHOS U/L  --   --   --  112   ALT U/L  --   --   --  56   AST U/L  --   --   --  35   EGFR ml/min/1 73sq m 93 86 92 87   GLUCOSE RANDOM mg/dL 117 164* 100 94                              Results from last 7 days   Lab Units 10/24/21  1802   LACTIC ACID mmol/L 0 9           Cultures:   Results from last 7 days   Lab Units 10/24/21  2000   COLOR UA  Light Yellow   CLARITY UA  Clear   SPEC GRAV UA  <=1 005   PH UA  5 0   LEUKOCYTES UA  Negative   NITRITE UA  Negative   GLUCOSE UA mg/dl Negative   KETONES UA mg/dl 15 (1+)* BILIRUBIN UA  Negative   BLOOD UA  Trace-Intact*      Results from last 7 days   Lab Units 10/24/21  2000   RBC UA /hpf 0-1   WBC UA /hpf None Seen   EPITHELIAL CELLS WET PREP /hpf None Seen   BACTERIA UA /hpf None Seen      Results from last 7 days   Lab Units 10/24/21  1919   C DIFF TOXIN B BY PCR  Positive*       PHYSICAL EXAM:  Vitals:   Blood Pressure: 130/58 (10/28/21 0805)  Pulse: 56 (10/26/21 1453)  Temperature: 98 1 °F (36 7 °C) (10/28/21 0805)  Temp Source: Oral (10/27/21 2100)  Respirations: 18 (10/28/21 0805)  Height: 5' 4" (162 6 cm) (10/25/21 1700)  Weight - Scale: 74 4 kg (164 lb) (10/25/21 1700)  SpO2: 96 % (10/27/21 2100)    General appearance: alert, appears stated age and cooperative  HEENT - atraumatic and normocephalic  Neck- supple  Skin - no fresh rash  Extremities no fresh focal deformities  Cardiovascular- S1-S2 heard  Respiratory- bilateral air entry present, no crackles or rhonchi  Skin - no fresh rash  Abdomen - normal bowel sounds present, no rebound tenderness  CNS- No fresh focal deficits  Psych- no acute psychosis     Planned Re-admission:  No  Discharge Disposition: Home/Self Care    Test Results Pending at Discharge:   Pending Labs     Order Current Status    Ova and parasite examination In process    Pancreatic elastase, fecal In process      Incidental findings:  None    Medications   · Summary of Medication Adjustments made as a result of this hospitalization:  Vancomycin 4 times a day for 2 weeks  · Medication Dosing Tapers - Please refer to Discharge Medication List for details on any medication dosing tapers (if applicable to patient)  · Discharge Medication List: See after visit summary for reconciled discharge medications       Diet restrictions:  None        Diet Orders   (From admission, onward)             Start     Ordered    10/25/21 1529  Diet Regular; Regular House  Diet effective now     Question Answer Comment   Diet Type Regular    Regular Regular House    RD to adjust diet per protocol? Yes        10/25/21 1528              Activity restrictions: No strenuous activity  Discharge Condition: stable    Outpatient Follow-Up and Discharge Instructions  See after visit summary section titled Discharge Instructions for information provided to patient and family  Code Status: Level 1 - Full Code  Discharge Statement   I spent 35 minutes discharging the patient  This time was spent on the day of discharge  Greater than 50% of total time was spent with the patient and / or family counseling and / or coordination of care  Cholo Bernard MD  [place] [id number] Internal Medicine    ** Please Note: This note has been constructed using a voice recognition system   **

## 2021-10-28 NOTE — PLAN OF CARE
Problem: Potential for Falls  Goal: Patient will remain free of falls  Description: INTERVENTIONS:  - Educate patient/family on patient safety including physical limitations  - Instruct patient to call for assistance with activity   - Consult OT/PT to assist with strengthening/mobility   - Keep Call bell within reach  - Keep bed low and locked with side rails adjusted as appropriate  - Keep care items and personal belongings within reach  - Initiate and maintain comfort rounds  - Make Fall Risk Sign visible to staff  - Offer Toileting every  Hours, in advance of need  - Initiate/Maintain alarm  - Obtain necessary fall risk management equipment:   - Apply yellow socks and bracelet for high fall risk patients  - Consider moving patient to room near nurses station  Outcome: Progressing     Problem: PAIN - ADULT  Goal: Verbalizes/displays adequate comfort level or baseline comfort level  Description: Interventions:  - Encourage patient to monitor pain and request assistance  - Assess pain using appropriate pain scale  - Administer analgesics based on type and severity of pain and evaluate response  - Implement non-pharmacological measures as appropriate and evaluate response  - Consider cultural and social influences on pain and pain management  - Notify physician/advanced practitioner if interventions unsuccessful or patient reports new pain  Outcome: Progressing     Problem: INFECTION - ADULT  Goal: Absence or prevention of progression during hospitalization  Description: INTERVENTIONS:  - Assess and monitor for signs and symptoms of infection  - Monitor lab/diagnostic results  - Monitor all insertion sites, i e  indwelling lines, tubes, and drains  - Monitor endotracheal if appropriate and nasal secretions for changes in amount and color  - [place] appropriate cooling/warming therapies per order  - Administer medications as ordered  - Instruct and encourage patient and family to use good hand hygiene technique  - Identify and instruct in appropriate isolation precautions for identified infection/condition  Outcome: Progressing  Goal: Absence of fever/infection during neutropenic period  Description: INTERVENTIONS:  - Monitor WBC    Outcome: Progressing     Problem: SAFETY ADULT  Goal: Maintain or return to baseline ADL function  Description: INTERVENTIONS:  -  Assess patient's ability to carry out ADLs; assess patient's baseline for ADL function and identify physical deficits which impact ability to perform ADLs (bathing, care of mouth/teeth, toileting, grooming, dressing, etc )  - Assess/evaluate cause of self-care deficits   - Assess range of motion  - Assess patient's mobility; develop plan if impaired  - Assess patient's need for assistive devices and provide as appropriate  - Encourage maximum independence but intervene and supervise when necessary  - Involve family in performance of ADLs  - Assess for home care needs following discharge   - Consider OT consult to assist with ADL evaluation and planning for discharge  - Provide patient education as appropriate  Outcome: Progressing  Goal: Maintains/Returns to pre admission functional level  Description: INTERVENTIONS:  - Perform BMAT or MOVE assessment daily    - Set and communicate daily mobility goal to care team and patient/family/caregiver  - Collaborate with rehabilitation services on mobility goals if consulted  - Perform Range of Motion  times a day  - Reposition patient every hours    - Dangle patient  times a day  - Stand patient  times a day  - Ambulate patient  times a day  - Out of bed to chair  times a day   - Out of bed for meals  times a day  - Out of bed for toileting  - Record patient progress and toleration of activity level   Outcome: Progressing     Problem: DISCHARGE PLANNING  Goal: Discharge to home or other facility with appropriate resources  Description: INTERVENTIONS:  - Identify barriers to discharge w/patient and caregiver  - Arrange for needed discharge resources and transportation as appropriate  - Identify discharge learning needs (meds, wound care, etc )  - Arrange for interpretive services to assist at discharge as needed  - Refer to Case Management Department for coordinating discharge planning if the patient needs post-hospital services based on physician/advanced practitioner order or complex needs related to functional status, cognitive ability, or social support system  Outcome: Progressing

## 2021-10-29 ENCOUNTER — TELEPHONE (OUTPATIENT)
Dept: GASTROENTEROLOGY | Facility: CLINIC | Age: 46
End: 2021-10-29

## 2021-10-29 ENCOUNTER — TRANSITIONAL CARE MANAGEMENT (OUTPATIENT)
Dept: FAMILY MEDICINE CLINIC | Facility: CLINIC | Age: 46
End: 2021-10-29

## 2021-10-29 NOTE — UTILIZATION REVIEW
Yara Worthy MD   Physician   Hospitalist   Discharge Summary       Signed   Date of Service:  10/28/2021 12:39 PM               Signed             Show:Clear all  [x]Manual[x]Template[x]Copied    Added by:  Micaela Smith MD    []Janette for details  [address] [facility]  Discharge- Amaury Ruano 1975, 48 y o  female MRN: [id number]  Unit/Bed#: -01 Encounter: [id number]  Primary Care Provider: Aniya Carlos MD   Date and time admitted to hospital: 10/24/2021  4:29 PM     * C  difficile colitis  Assessment & Plan  Patient reports stool is getting a little bit formed still has up to 15 bowel movements daily  Patient still mild lower abdominal tenderness  No blood noted recently  Has been having significant diarrhea approximately 20-30 bowel movements per day for the past 2-3 weeks  Reports weight loss  Reports mucous and blood in stool yesterday  History of exposure to Augmentin ten-day course 1 month ago  CT scan revealed mild colitis  Stool PCR and antigen a positive for C diff     -- continue vancomycin 125 mg p o  Q 6 hours for 2 weeks  -- stay away from dairy products and lactulose as this can provoke diarrhea     Hematoma  Assessment & Plan  CT scan revealed 6 1 cm fluid collection suggesting abscess- but clinically not an abscess  However on physical examination appears to be hematoma- clinically resolving  WBC count within normal limits  Currently afebrile  Surgery input appreciated and no operative intervention        Mixed hyperlipidemia  Assessment & Plan  Continue colestipol      Essential hypertension  Assessment & Plan  Continue home ARB     Blood pressure 130/58, pulse 56, temperature 98 1 °F (36 7 °C), resp  rate 18, height 5' 4" (1 626 m), weight 74 4 kg (164 lb), SpO2 96 %             Bipolar 1 disorder (HCC)  Assessment & Plan  Continue home Seroquel and topiramate      Gastroesophageal reflux disease without esophagitis  Assessment & Plan  Continue home Protonix         Admitting Provider:  Meredith Vergara MD  Discharge Provider:  Viri Gresham MD  Admission Date: 10/24/2021       Discharge Date: 10/28/21   LOS: 4  Primary Care Physician at Discharge: Danny Aase, [phone]     HOSPITAL COURSE:  Lucinda Rdz is a 48 y o  female who presented with diarrhea and C diff colitis  Patient did also have hematoma in his gluteal region that clinically was resolving  Patient was treated with oral vancomycin and improved  GI and surgery saw the patient during the hospital course  Patient did have just 1 loose bowel movement on the day of discharge and is clinically much better  Afebrile and hemodynamically stable     REASON FOR ADMISSION/ ADMISSION DIAGNOSES     Diarrhea possible C diff colitis     DISCHARGE DIAGNOSES  * C  difficile colitis  Assessment & Plan  Patient reports stool is getting a little bit formed still has up to 15 bowel movements daily  Patient still mild lower abdominal tenderness  No blood noted recently  Has been having significant diarrhea approximately 20-30 bowel movements per day for the past 2-3 weeks  Reports weight loss  Reports mucous and blood in stool yesterday  History of exposure to Augmentin ten-day course 1 month ago  CT scan revealed mild colitis  Stool PCR and antigen a positive for C diff     -- continue vancomycin 125 mg p o  Q 6 hours for 2 weeks  -- stay away from dairy products and lactulose as this can provoke diarrhea     Hematoma  Assessment & Plan  CT scan revealed 6 1 cm fluid collection suggesting abscess- but clinically not an abscess  However on physical examination appears to be hematoma- clinically resolving  WBC count within normal limits  Currently afebrile    Surgery input appreciated and no operative intervention        Mixed hyperlipidemia  Assessment & Plan  Continue colestipol      Essential hypertension  Assessment & Plan  Continue home ARB     Blood pressure 130/58, pulse 56, temperature 98 1 °F (36 7 °C), resp  rate 18, height 5' 4" (1 626 m), weight 74 4 kg (164 lb), SpO2 96 %           Bipolar 1 disorder (HCC)  Assessment & Plan  Continue home Seroquel and topiramate      Gastroesophageal reflux disease without esophagitis  Assessment & Plan  Continue home Protonix         CONSULTING PROVIDERS   IP CONSULT TO GASTROENTEROLOGY  IP CONSULT TO ACUTE CARE SURGERY     PROCEDURES PERFORMED  * No surgery found *     RADIOLOGY RESULTS  CT abdomen pelvis with contrast     Result Date: 10/24/2021  Impression: 1  Liquid stool throughout the colon with mild sigmoid colonic wall thickening, suggesting the possibility of mild colitis  2   Approximately 6 1 cm fluid collection with thin rim enhancement in the right upper buttock subcutaneous fat, suggesting abscess  Possible tract to the right medial buttock skin  Recommend direct inspection  3  Hepatomegaly with severe hepatic steatosis  The study was marked in [facility] for immediate notification   Workstation performed: [id number]         LABS          Results from last 7 days   Lab Units 10/27/21  0529 10/26/21  0848 10/25/21  0531 10/24/21  1802   WBC Thousand/uL 4 89 4 74 5 79 9 36   HEMOGLOBIN g/dL 12 4 11 8 12 0 14 1   HEMATOCRIT % 37 2 35 6 35 3 41 5   MCV fL 88 88 88 87   PLATELETS Thousands/uL 186 172 177 241              Results from last 7 days   Lab Units 10/27/21  0529 10/26/21  0848 10/25/21  0531 10/24/21  1802   SODIUM mmol/L 145 148* 146* 142   POTASSIUM mmol/L 3 7 4 1 3 1* 3 6   CHLORIDE mmol/L 112* 115* 112* 105   CO2 mmol/L 22 23 25 24   BUN mg/dL 16 15 14 15   CREATININE mg/dL 0 76 0 81 0 77 0 80   CALCIUM mg/dL 8 5 8 7 8 4 9 3   ALBUMIN g/dL  --   --   --  4 1   TOTAL BILIRUBIN mg/dL  --   --   --  0 43   ALK PHOS U/L  --   --   --  112   ALT U/L  --   --   --  56   AST U/L  --   --   --  35   EGFR ml/min/1 73sq m 93 86 92 87   GLUCOSE RANDOM mg/dL 117 164* 100 94     Results from last 7 days   Lab Units 10/24/21  1802   LACTIC ACID mmol/L 0 9             Cultures:        Results from last 7 days   Lab Units 10/24/21  2000   COLOR UA   Light Yellow   CLARITY UA   Clear   SPEC GRAV UA   <=1 005   PH UA   5 0   LEUKOCYTES UA   Negative   NITRITE UA   Negative   GLUCOSE UA mg/dl Negative   KETONES UA mg/dl 15 (1+)*   BILIRUBIN UA   Negative   BLOOD UA   Trace-Intact*           Results from last 7 days   Lab Units 10/24/21  2000   RBC UA /hpf 0-1   WBC UA /hpf None Seen   EPITHELIAL CELLS WET PREP /hpf None Seen   BACTERIA UA /hpf None Seen           Results from last 7 days   Lab Units 10/24/21  1919   C DIFF TOXIN B BY PCR   Positive*         PHYSICAL EXAM:  Vitals:   Blood Pressure: 130/58 (10/28/21 0805)  Pulse: 56 (10/26/21 1453)  Temperature: 98 1 °F (36 7 °C) (10/28/21 0805)  Temp Source: Oral (10/27/21 2100)  Respirations: 18 (10/28/21 0805)  Height: 5' 4" (162 6 cm) (10/25/21 1700)  Weight - Scale: 74 4 kg (164 lb) (10/25/21 1700)  SpO2: 96 % (10/27/21 2100)     General appearance: alert, appears stated age and cooperative  HEENT - atraumatic and normocephalic  Neck- supple  Skin - no fresh rash  Extremities no fresh focal deformities  Cardiovascular- S1-S2 heard  Respiratory- bilateral air entry present, no crackles or rhonchi  Skin - no fresh rash  Abdomen - normal bowel sounds present, no rebound tenderness  CNS- No fresh focal deficits  Psych- no acute psychosis      Planned Re-admission:  No  Discharge Disposition: Home/Self Care     Test Results Pending at Discharge:        Pending Labs      Order Current Status     Ova and parasite examination In process     Pancreatic elastase, fecal In process       Incidental findings:  None     Medications   · Summary of Medication Adjustments made as a result of this hospitalization:  Vancomycin 4 times a day for 2 weeks  · Medication Dosing Tapers - Please refer to Discharge Medication List for details on any medication dosing tapers (if applicable to patient)  · Discharge Medication List: See after visit summary for reconciled discharge medications       Diet restrictions:  None              Diet Orders   (From admission, onward)                             Start     Ordered      10/25/21 1529   Diet Regular; Regular House  Diet effective now     Question Answer Comment   Diet Type Regular     Regular Regular House     RD to adjust diet per protocol? Yes         10/25/21 1528                  Activity restrictions: No strenuous activity  Discharge Condition: stable     Outpatient Follow-Up and Discharge Instructions  See after visit summary section titled Discharge Instructions for information provided to patient and family        Code Status: Level 1 - Full Code  Discharge Statement   I spent 35 minutes discharging the patient  This time was spent on the day of discharge  Greater than 50% of total time was spent with the patient and / or family counseling and / or coordination of care   Amelia Silveira MD  [place] [phone] Internal Medicine     ** Please Note: This note has been constructed using a voice recognition system   **

## 2021-11-08 ENCOUNTER — HOSPITAL ENCOUNTER (EMERGENCY)
Facility: HOSPITAL | Age: 46
Discharge: HOME | End: 2021-11-08
Attending: EMERGENCY MEDICINE | Admitting: EMERGENCY MEDICINE
Payer: COMMERCIAL

## 2021-11-08 ENCOUNTER — APPOINTMENT (EMERGENCY)
Dept: CT IMAGING | Facility: HOSPITAL | Age: 46
End: 2021-11-08
Payer: COMMERCIAL

## 2021-11-08 VITALS
HEART RATE: 74 BPM | OXYGEN SATURATION: 99 % | SYSTOLIC BLOOD PRESSURE: 172 MMHG | DIASTOLIC BLOOD PRESSURE: 91 MMHG | TEMPERATURE: 98.4 F | RESPIRATION RATE: 18 BRPM

## 2021-11-08 DIAGNOSIS — A04.72 C. DIFFICILE COLITIS: ICD-10-CM

## 2021-11-08 DIAGNOSIS — L03.211 FACIAL CELLULITIS: ICD-10-CM

## 2021-11-08 DIAGNOSIS — L03.213 PRESEPTAL CELLULITIS OF LEFT EYE: Primary | ICD-10-CM

## 2021-11-08 LAB
ALBUMIN SERPL BCP-MCNC: 3.7 G/DL (ref 3.5–5)
ALP SERPL-CCNC: 99 U/L (ref 46–116)
ALT SERPL W P-5'-P-CCNC: 44 U/L (ref 12–78)
ANION GAP SERPL CALCULATED.3IONS-SCNC: 9 MMOL/L (ref 4–13)
APTT PPP: 28 SECONDS (ref 23–37)
AST SERPL W P-5'-P-CCNC: 46 U/L (ref 5–45)
BASOPHILS # BLD AUTO: 0.06 THOUSANDS/ΜL (ref 0–0.1)
BASOPHILS NFR BLD AUTO: 1 % (ref 0–1)
BILIRUB SERPL-MCNC: 0.33 MG/DL (ref 0.2–1)
BUN SERPL-MCNC: 13 MG/DL (ref 5–25)
CALCIUM SERPL-MCNC: 8.7 MG/DL (ref 8.3–10.1)
CHLORIDE SERPL-SCNC: 107 MMOL/L (ref 100–108)
CO2 SERPL-SCNC: 27 MMOL/L (ref 21–32)
CREAT SERPL-MCNC: 0.61 MG/DL (ref 0.6–1.3)
EOSINOPHIL # BLD AUTO: 0.17 THOUSAND/ΜL (ref 0–0.61)
EOSINOPHIL NFR BLD AUTO: 3 % (ref 0–6)
ERYTHROCYTE [DISTWIDTH] IN BLOOD BY AUTOMATED COUNT: 12.7 % (ref 11.6–15.1)
GFR SERPL CREATININE-BSD FRML MDRD: 108 ML/MIN/1.73SQ M
GLUCOSE SERPL-MCNC: 118 MG/DL (ref 65–140)
HCT VFR BLD AUTO: 40.9 % (ref 34.8–46.1)
HGB BLD-MCNC: 13.5 G/DL (ref 11.5–15.4)
IMM GRANULOCYTES # BLD AUTO: 0.01 THOUSAND/UL (ref 0–0.2)
IMM GRANULOCYTES NFR BLD AUTO: 0 % (ref 0–2)
INR PPP: 1.01 (ref 0.84–1.19)
LACTATE SERPL-SCNC: 0.7 MMOL/L (ref 0.5–2)
LYMPHOCYTES # BLD AUTO: 1.28 THOUSANDS/ΜL (ref 0.6–4.47)
LYMPHOCYTES NFR BLD AUTO: 19 % (ref 14–44)
MCH RBC QN AUTO: 29.3 PG (ref 26.8–34.3)
MCHC RBC AUTO-ENTMCNC: 33 G/DL (ref 31.4–37.4)
MCV RBC AUTO: 89 FL (ref 82–98)
MONOCYTES # BLD AUTO: 0.68 THOUSAND/ΜL (ref 0.17–1.22)
MONOCYTES NFR BLD AUTO: 10 % (ref 4–12)
NEUTROPHILS # BLD AUTO: 4.44 THOUSANDS/ΜL (ref 1.85–7.62)
NEUTS SEG NFR BLD AUTO: 67 % (ref 43–75)
NRBC BLD AUTO-RTO: 0 /100 WBCS
PLATELET # BLD AUTO: 254 THOUSANDS/UL (ref 149–390)
PMV BLD AUTO: 9.2 FL (ref 8.9–12.7)
POTASSIUM SERPL-SCNC: 4.2 MMOL/L (ref 3.5–5.3)
PROT SERPL-MCNC: 7.4 G/DL (ref 6.4–8.2)
PROTHROMBIN TIME: 12.9 SECONDS (ref 11.6–14.5)
RBC # BLD AUTO: 4.61 MILLION/UL (ref 3.81–5.12)
SODIUM SERPL-SCNC: 143 MMOL/L (ref 136–145)
WBC # BLD AUTO: 6.64 THOUSAND/UL (ref 4.31–10.16)

## 2021-11-08 PROCEDURE — 80053 COMPREHEN METABOLIC PANEL: CPT | Performed by: EMERGENCY MEDICINE

## 2021-11-08 PROCEDURE — 99284 EMERGENCY DEPT VISIT MOD MDM: CPT

## 2021-11-08 PROCEDURE — 99284 EMERGENCY DEPT VISIT MOD MDM: CPT | Performed by: EMERGENCY MEDICINE

## 2021-11-08 PROCEDURE — 36415 COLL VENOUS BLD VENIPUNCTURE: CPT | Performed by: EMERGENCY MEDICINE

## 2021-11-08 PROCEDURE — 85730 THROMBOPLASTIN TIME PARTIAL: CPT | Performed by: EMERGENCY MEDICINE

## 2021-11-08 PROCEDURE — 85610 PROTHROMBIN TIME: CPT | Performed by: EMERGENCY MEDICINE

## 2021-11-08 PROCEDURE — 85025 COMPLETE CBC W/AUTO DIFF WBC: CPT | Performed by: EMERGENCY MEDICINE

## 2021-11-08 PROCEDURE — 70491 CT SOFT TISSUE NECK W/DYE: CPT

## 2021-11-08 PROCEDURE — 83605 ASSAY OF LACTIC ACID: CPT | Performed by: EMERGENCY MEDICINE

## 2021-11-08 RX ORDER — VANCOMYCIN HYDROCHLORIDE 125 MG/1
125 CAPSULE ORAL 2 TIMES DAILY
Qty: 14 CAPSULE | Refills: 0 | Status: SHIPPED | OUTPATIENT
Start: 2021-11-08 | End: 2021-11-15

## 2021-11-08 RX ORDER — AMOXICILLIN AND CLAVULANATE POTASSIUM 875; 125 MG/1; MG/1
1 TABLET, FILM COATED ORAL EVERY 12 HOURS
Qty: 14 TABLET | Refills: 0 | Status: SHIPPED | OUTPATIENT
Start: 2021-11-08 | End: 2021-11-15

## 2021-11-08 RX ORDER — AMOXICILLIN AND CLAVULANATE POTASSIUM 875; 125 MG/1; MG/1
1 TABLET, FILM COATED ORAL ONCE
Status: DISCONTINUED | OUTPATIENT
Start: 2021-11-08 | End: 2021-11-08 | Stop reason: HOSPADM

## 2021-11-08 RX ADMIN — IOHEXOL 85 ML: 350 INJECTION, SOLUTION INTRAVENOUS at 09:53

## 2021-12-06 ENCOUNTER — OFFICE VISIT (OUTPATIENT)
Dept: GASTROENTEROLOGY | Facility: CLINIC | Age: 46
End: 2021-12-06
Payer: COMMERCIAL

## 2021-12-06 VITALS
BODY MASS INDEX: 27.66 KG/M2 | WEIGHT: 162 LBS | SYSTOLIC BLOOD PRESSURE: 142 MMHG | HEART RATE: 75 BPM | DIASTOLIC BLOOD PRESSURE: 82 MMHG | HEIGHT: 64 IN

## 2021-12-06 DIAGNOSIS — R30.0 DYSURIA: Primary | ICD-10-CM

## 2021-12-06 DIAGNOSIS — B37.3 VAGINAL YEAST INFECTION: ICD-10-CM

## 2021-12-06 DIAGNOSIS — R10.84 GENERALIZED ABDOMINAL PAIN: ICD-10-CM

## 2021-12-06 DIAGNOSIS — R19.7 DIARRHEA, UNSPECIFIED TYPE: ICD-10-CM

## 2021-12-06 DIAGNOSIS — K21.9 GASTROESOPHAGEAL REFLUX DISEASE, UNSPECIFIED WHETHER ESOPHAGITIS PRESENT: ICD-10-CM

## 2021-12-06 PROCEDURE — 99213 OFFICE O/P EST LOW 20 MIN: CPT | Performed by: PHYSICIAN ASSISTANT

## 2021-12-06 RX ORDER — MENTHOL AND ZINC OXIDE .44; 20.625 G/100G; G/100G
OINTMENT TOPICAL 2 TIMES DAILY
Qty: 113 G | Refills: 3 | Status: SHIPPED | OUTPATIENT
Start: 2021-12-06

## 2021-12-06 RX ORDER — ESOMEPRAZOLE MAGNESIUM 40 MG/1
40 CAPSULE, DELAYED RELEASE ORAL
Qty: 60 CAPSULE | Refills: 3 | Status: SHIPPED | OUTPATIENT
Start: 2021-12-06

## 2021-12-06 RX ORDER — FLUCONAZOLE 100 MG/1
TABLET ORAL
Qty: 1 TABLET | Refills: 0 | Status: SHIPPED | OUTPATIENT
Start: 2021-12-06 | End: 2021-12-06

## 2021-12-22 ENCOUNTER — TELEPHONE (OUTPATIENT)
Dept: GASTROENTEROLOGY | Facility: HOSPITAL | Age: 46
End: 2021-12-22

## 2021-12-23 ENCOUNTER — ANESTHESIA EVENT (OUTPATIENT)
Dept: GASTROENTEROLOGY | Facility: HOSPITAL | Age: 46
End: 2021-12-23

## 2021-12-23 ENCOUNTER — HOSPITAL ENCOUNTER (OUTPATIENT)
Dept: GASTROENTEROLOGY | Facility: HOSPITAL | Age: 46
Setting detail: OUTPATIENT SURGERY
Discharge: HOME | End: 2021-12-23
Payer: COMMERCIAL

## 2021-12-23 ENCOUNTER — ANESTHESIA (OUTPATIENT)
Dept: GASTROENTEROLOGY | Facility: HOSPITAL | Age: 46
End: 2021-12-23

## 2021-12-23 VITALS
HEART RATE: 62 BPM | RESPIRATION RATE: 18 BRPM | DIASTOLIC BLOOD PRESSURE: 78 MMHG | TEMPERATURE: 97.9 F | OXYGEN SATURATION: 98 % | SYSTOLIC BLOOD PRESSURE: 150 MMHG

## 2021-12-23 DIAGNOSIS — K21.9 GASTROESOPHAGEAL REFLUX DISEASE, UNSPECIFIED WHETHER ESOPHAGITIS PRESENT: ICD-10-CM

## 2021-12-23 DIAGNOSIS — R10.84 GENERALIZED ABDOMINAL PAIN: ICD-10-CM

## 2021-12-23 DIAGNOSIS — R19.7 DIARRHEA, UNSPECIFIED TYPE: ICD-10-CM

## 2021-12-23 PROCEDURE — 88305 TISSUE EXAM BY PATHOLOGIST: CPT | Performed by: PATHOLOGY

## 2021-12-23 PROCEDURE — 45380 COLONOSCOPY AND BIOPSY: CPT | Performed by: INTERNAL MEDICINE

## 2021-12-23 PROCEDURE — 43239 EGD BIOPSY SINGLE/MULTIPLE: CPT | Performed by: INTERNAL MEDICINE

## 2021-12-23 RX ORDER — PROPOFOL 10 MG/ML
INJECTION, EMULSION INTRAVENOUS AS NEEDED
Status: DISCONTINUED | OUTPATIENT
Start: 2021-12-23 | End: 2021-12-23

## 2021-12-23 RX ORDER — LIDOCAINE HYDROCHLORIDE 20 MG/ML
INJECTION, SOLUTION EPIDURAL; INFILTRATION; INTRACAUDAL; PERINEURAL AS NEEDED
Status: DISCONTINUED | OUTPATIENT
Start: 2021-12-23 | End: 2021-12-23

## 2021-12-23 RX ORDER — SODIUM CHLORIDE, SODIUM LACTATE, POTASSIUM CHLORIDE, CALCIUM CHLORIDE 600; 310; 30; 20 MG/100ML; MG/100ML; MG/100ML; MG/100ML
INJECTION, SOLUTION INTRAVENOUS CONTINUOUS PRN
Status: DISCONTINUED | OUTPATIENT
Start: 2021-12-23 | End: 2021-12-23

## 2021-12-23 RX ADMIN — PROPOFOL 30 MG: 10 INJECTION, EMULSION INTRAVENOUS at 13:18

## 2021-12-23 RX ADMIN — PROPOFOL 50 MG: 10 INJECTION, EMULSION INTRAVENOUS at 13:30

## 2021-12-23 RX ADMIN — LIDOCAINE HYDROCHLORIDE 120 MG: 20 INJECTION, SOLUTION EPIDURAL; INFILTRATION; INTRACAUDAL; PERINEURAL at 13:15

## 2021-12-23 RX ADMIN — PROPOFOL 50 MG: 10 INJECTION, EMULSION INTRAVENOUS at 13:22

## 2021-12-23 RX ADMIN — PROPOFOL 50 MG: 10 INJECTION, EMULSION INTRAVENOUS at 13:26

## 2021-12-23 RX ADMIN — SODIUM CHLORIDE, SODIUM LACTATE, POTASSIUM CHLORIDE, AND CALCIUM CHLORIDE: .6; .31; .03; .02 INJECTION, SOLUTION INTRAVENOUS at 13:10

## 2021-12-23 RX ADMIN — PROPOFOL 120 MG: 10 INJECTION, EMULSION INTRAVENOUS at 13:15

## 2021-12-23 RX ADMIN — PROPOFOL 20 MG: 10 INJECTION, EMULSION INTRAVENOUS at 13:33

## 2022-01-10 ENCOUNTER — TELEPHONE (OUTPATIENT)
Dept: FAMILY MEDICINE CLINIC | Facility: CLINIC | Age: 47
End: 2022-01-10

## 2022-01-10 NOTE — TELEPHONE ENCOUNTER
Left message for patient regarding missed appointment with dr Mayito Hoff  Left office number if patient would like to reschedule

## 2022-08-19 ENCOUNTER — OFFICE VISIT (OUTPATIENT)
Dept: FAMILY MEDICINE CLINIC | Facility: CLINIC | Age: 47
End: 2022-08-19
Payer: COMMERCIAL

## 2022-08-19 VITALS
TEMPERATURE: 98.1 F | RESPIRATION RATE: 20 BRPM | HEART RATE: 75 BPM | WEIGHT: 167 LBS | DIASTOLIC BLOOD PRESSURE: 92 MMHG | BODY MASS INDEX: 28.51 KG/M2 | OXYGEN SATURATION: 97 % | HEIGHT: 64 IN | SYSTOLIC BLOOD PRESSURE: 160 MMHG

## 2022-08-19 DIAGNOSIS — M54.2 NECK PAIN: ICD-10-CM

## 2022-08-19 DIAGNOSIS — R07.9 CHEST PAIN, UNSPECIFIED TYPE: ICD-10-CM

## 2022-08-19 DIAGNOSIS — F41.9 ANXIETY: ICD-10-CM

## 2022-08-19 DIAGNOSIS — F20.9 SCHIZOPHRENIA, UNSPECIFIED TYPE (HCC): ICD-10-CM

## 2022-08-19 DIAGNOSIS — Z00.00 WELL ADULT EXAM: Primary | ICD-10-CM

## 2022-08-19 DIAGNOSIS — Z72.0 TOBACCO ABUSE: ICD-10-CM

## 2022-08-19 DIAGNOSIS — F33.41 RECURRENT MAJOR DEPRESSIVE DISORDER, IN PARTIAL REMISSION (HCC): ICD-10-CM

## 2022-08-19 DIAGNOSIS — E78.2 MIXED HYPERLIPIDEMIA: ICD-10-CM

## 2022-08-19 DIAGNOSIS — I10 ESSENTIAL HYPERTENSION: ICD-10-CM

## 2022-08-19 PROCEDURE — 99396 PREV VISIT EST AGE 40-64: CPT | Performed by: FAMILY MEDICINE

## 2022-08-19 RX ORDER — LOSARTAN POTASSIUM 50 MG/1
50 TABLET ORAL DAILY
Qty: 30 TABLET | Refills: 5 | Status: SHIPPED | OUTPATIENT
Start: 2022-08-19 | End: 2022-09-28

## 2022-08-19 RX ORDER — ALPRAZOLAM 0.25 MG/1
0.25 TABLET ORAL
Qty: 30 TABLET | Refills: 1 | Status: SHIPPED | OUTPATIENT
Start: 2022-08-19 | End: 2022-09-28 | Stop reason: SDUPTHER

## 2022-08-19 RX ORDER — NAPROXEN 500 MG/1
500 TABLET ORAL 2 TIMES DAILY WITH MEALS
Qty: 20 TABLET | Refills: 1 | Status: SHIPPED | OUTPATIENT
Start: 2022-08-19 | End: 2022-09-07
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
## 2022-08-22 ENCOUNTER — TELEPHONE (OUTPATIENT)
Dept: FAMILY MEDICINE CLINIC | Facility: CLINIC | Age: 47
End: 2022-08-22

## 2022-08-22 DIAGNOSIS — R06.02 SOB (SHORTNESS OF BREATH): Primary | ICD-10-CM

## 2022-08-23 ENCOUNTER — HOSPITAL ENCOUNTER (OUTPATIENT)
Dept: RADIOLOGY | Facility: HOSPITAL | Age: 47
Discharge: HOME | End: 2022-08-23
Payer: COMMERCIAL

## 2022-08-23 DIAGNOSIS — M54.2 NECK PAIN: ICD-10-CM

## 2022-08-23 PROCEDURE — 73030 X-RAY EXAM OF SHOULDER: CPT

## 2022-08-23 PROCEDURE — 72050 X-RAY EXAM NECK SPINE 4/5VWS: CPT

## 2022-08-30 ENCOUNTER — HOSPITAL ENCOUNTER (OUTPATIENT)
Dept: NON INVASIVE DIAGNOSTICS | Facility: HOSPITAL | Age: 47
Discharge: HOME | End: 2022-08-30
Payer: COMMERCIAL

## 2022-08-30 DIAGNOSIS — R06.02 SOB (SHORTNESS OF BREATH): ICD-10-CM

## 2022-08-30 PROCEDURE — 93017 CV STRESS TEST TRACING ONLY: CPT

## 2022-08-31 ENCOUNTER — TELEPHONE (OUTPATIENT)
Dept: FAMILY MEDICINE CLINIC | Facility: CLINIC | Age: 47
End: 2022-08-31

## 2022-08-31 NOTE — TELEPHONE ENCOUNTER
Pt called to say that she had a Stress Test & EKG yesterday and they may be wanting to recommend a Nuclear Stress Test For her   I told her that you would look over her test results and get back to her on this  Thanks!

## 2022-09-01 NOTE — TELEPHONE ENCOUNTER
I could not see the stress test results for they are not dictated yet  If the cardiologist is recommending a nuclear stress test, that usually means the stress test was either positive on inconclusive and a nuclear stress test will help decide the cardiologist the best care for her

## 2022-09-01 NOTE — TELEPHONE ENCOUNTER
Patient reports they were unable to do the stress test due to EKG showing something with her arteries would cause it to be inconclusive? They then suggested a nuclear stress test for her and she just needs the order

## 2022-09-02 ENCOUNTER — TELEPHONE (OUTPATIENT)
Dept: FAMILY MEDICINE CLINIC | Facility: CLINIC | Age: 47
End: 2022-09-02

## 2022-09-02 DIAGNOSIS — R94.39 ABNORMAL STRESS TEST: Primary | ICD-10-CM

## 2022-09-02 NOTE — TELEPHONE ENCOUNTER
Pt asked for a cardiologist referral to someone in the [facility] area so she can see them for the Nuclear Stress Test Referral  Please adive pt

## 2022-09-06 VITALS
OXYGEN SATURATION: 96 % | SYSTOLIC BLOOD PRESSURE: 142 MMHG | BODY MASS INDEX: 28.51 KG/M2 | HEIGHT: 64 IN | WEIGHT: 167 LBS | HEART RATE: 62 BPM | DIASTOLIC BLOOD PRESSURE: 86 MMHG

## 2022-09-07 DIAGNOSIS — M54.2 NECK PAIN: ICD-10-CM

## 2022-09-07 RX ORDER — NAPROXEN 500 MG/1
TABLET ORAL
Qty: 20 TABLET | Refills: 1 | Status: SHIPPED | OUTPATIENT
Start: 2022-09-07 | End: 2022-10-24

## 2022-09-27 NOTE — PROGRESS NOTES
Name: Malia Quintana      : 1975      MRN: [id number]  Encounter Provider: Sarah Edwards MD  Encounter Date: 2022   Encounter department: [address] [place] [address] Place     1  Gastroesophageal reflux disease without esophagitis  Assessment & Plan:  Patient to continue with present therapy and decrease caffeine, avoid ETOH and smoking to decrease acid production  Pt should also cease eating prior to bedtime and avoid excessive fluid intake prior to sleep  May use antacids as needed for breakthrough GERD  All pateint questions answered today about this condition  2  Essential hypertension  Assessment & Plan:  Patient is stable with current anti-hypertensive medicine and continue to follow a low sodium diet and take current medication  All questions about this condition were answered today  Orders:  -     losartan (Cozaar) 100 MG tablet; Take 1 tablet (100 mg total) by mouth daily    3  Bipolar 1 disorder [facility])  Assessment & Plan:  Patient is stable  and will continue present plan of care and reassess at next routine visit  All questions about this problem from patient were answered today  4  Mixed hyperlipidemia  Assessment & Plan:  Patient  is stable with current medication and we discussed a low fat low cholesterol diet  Weight loss also discussed for this will help lower cholesterol also  Recheck lipids in 6 months  5  Tobacco abuse  Assessment & Plan:  Patient encouraged to quit using tobacco for that increases their risk for COPD, lung cancer,stroke, oral cancer and heart disease  If patient does not want to quit they should let me know  when they are interested in quitting  There are numerous options to use to quit and we can discuss them  6  Abnormal stress test  -     Ambulatory Referral to Cardiology    7  Gastroesophageal reflux disease, unspecified whether esophagitis present  -     esomeprazole (NexIUM) 40 MG capsule;  Take 1 capsule (40 mg total) by mouth 2 (two) times a day before meals    8  Anxiety  -     ALPRAZolam (XANAX) 0 25 mg tablet; Take 2 tablets (0 5 mg total) by mouth daily at bedtime as needed for anxiety    9  Screening mammogram for high-risk patient  -     Mammo screening bilateral w 3d & cad; Future; Expected date: 09/28/2022        Depression Screening and Follow-up Plan: Patient assessed for underlying major depression  Brief counseling provided and recommend additional follow-up/re-evaluation next office visit  Continue regular follow-up with their mental health provider who is managing their mental health condition(s)  Patient advised to follow-up with PCP for further management  Subjective     Is a 49-year-old female here today for checkup on multiple medical problems  Patient with GERD bipolar type 1 hypertension and here for checkup on her blood pressure  Patient is on Cozaar 50 mg her blood pressure still on the high side we are going to go up to 100 mg and recheck her back in 1 month  Patient also is having some problems sleeping at night she was seeing a psychiatrist but she is now looking for a new 1 I told her with her diagnosis she really should see a psychiatrist because she is going to need frequent changes of her medicines that I do not usually use  Will see patient back in approximately 1 month to recheck her blood pressure she is having a lot of stress she is taking care of her brother who has stage IV colon cancer and she has some marital distant discord that does not help with her condition either  Review of Systems   Constitutional: Negative for activity change, appetite change, fatigue and fever  HENT: Negative for congestion, ear pain, postnasal drip, rhinorrhea, sinus pressure, sinus pain, sneezing and sore throat  Eyes: Negative for pain and redness  Respiratory: Negative for apnea, cough, chest tightness, shortness of breath and wheezing      Cardiovascular: Negative for chest pain, palpitations and leg swelling  Gastrointestinal: Negative for abdominal pain, constipation, diarrhea, nausea and vomiting  Endocrine: Negative for cold intolerance and heat intolerance  Genitourinary: Negative for difficulty urinating, dysuria, frequency, hematuria and urgency  Musculoskeletal: Negative for arthralgias, back pain, gait problem and myalgias  Skin: Negative for rash  Neurological: Negative for dizziness, speech difficulty, weakness, numbness and headaches  Hematological: Does not bruise/bleed easily  Psychiatric/Behavioral: Negative for agitation, confusion and hallucinations         Past Medical History:   Diagnosis Date    Anxiety     Bipolar 1 disorder ([place] [address] ) 2020    Bipolar 1 disorder ([place] [address] ) 2020    [facility]ipolar disease, manic ([facility])     Depression     Gastroesophageal reflux disease without esophagitis 2020    Schizophrenia ([facility])      Past Surgical History:   Procedure Laterality Date     SECTION      x2    HYSTERECTOMY      KNEE SURGERY Left     4 times    LEG SURGERY Right     thony placed then removed, broken femur    NEUROPLASTY / TRANSPOSITION MEDIAN NERVE AT CARPAL TUNNEL       Family History   Family history unknown: Yes     Social History     Socioeconomic History    Marital status: /Civil Union     Spouse name: None    Number of children: None    Years of education: None    Highest education level: None   Occupational History    None   Tobacco Use    Smoking status: Current Every Day Smoker     Packs/day: 0 25     Types: Cigarettes    Smokeless tobacco: Never Used   Vaping Use    Vaping Use: Never used   Substance and Sexual Activity    Alcohol use: Not Currently    Drug use: Not Currently     Types: Methamphetamines    Sexual activity: None   Other Topics Concern    None   Social History Narrative    · Do you currently or have you served in [employer]:   No      · Were you activated, into active duty, as a member of the [employer] and [employer] or as a Reservist:   No        · Exercise level:   None      · Diet:   Regular      · General stress level:   High       · Caffeine intake: Moderate        · Seat belts used routinely:   Yes      · Smoke alarm in home:   Yes      Social Determinants of Health     Financial Resource Strain: Not on file   Food Insecurity: Food Insecurity Present    Worried About Running Out of Food in the Last Year: Sometimes true    Genesis of Food in the Last Year: Sometimes true   Transportation Needs: No Transportation Needs    Lack of Transportation (Medical): No    Lack of Transportation (Non-Medical):  No   Physical Activity: Not on file   Stress: Not on file   Social Connections: Not on file   Intimate Partner Violence: Not on file   Housing Stability: High Risk    Unable to Pay for Housing in the Last Year: Yes    Number of Places Lived in the Last Year: 2    Unstable Housing in the Last Year: Yes     Current Outpatient Medications on File Prior to Visit   Medication Sig    acetaminophen (TYLENOL) 325 mg tablet Take 2 tablets (650 mg total) by mouth every 6 (six) hours as needed for mild pain, headaches or fever (Patient not taking: Reported on 8/19/2022)    menthol-zinc oxide (CALMOSPETINE) 0 44-20 625 % Apply topically 2 (two) times a day (Patient not taking: Reported on 8/19/2022)    naproxen (NAPROSYN) 500 mg tablet take 1 tablet by mouth twice a day with meals    PARoxetine (PAXIL) 40 MG tablet Take 1 tablet (40 mg total) by mouth every morning (Patient not taking: Reported on 8/19/2022)    potassium chloride (K-DUR,KLOR-CON) 20 mEq tablet Take 1 tablet (20 mEq total) by mouth daily for 7 days    QUEtiapine (SEROquel) 300 mg tablet Take 1 tablet (300 mg total) by mouth daily at bedtime (Patient not taking: Reported on 8/19/2022)    valsartan (DIOVAN) 320 MG tablet TAKE 1 TABLET BY MOUTH EVERY DAY (Patient not taking: Reported on 8/19/2022)    [DISCONTINUED] ALPRAZolam (XANAX) 0 25 mg tablet Take 1 tablet (0 25 mg total) by mouth daily at bedtime as needed for anxiety    [DISCONTINUED] esomeprazole (NexIUM) 40 MG capsule Take 1 capsule (40 mg total) by mouth 2 (two) times a day before meals    [DISCONTINUED] losartan (Cozaar) 50 mg tablet Take 1 tablet (50 mg total) by mouth daily     Allergies   Allergen Reactions    Ciprofloxacin Hives    Other Other (See Comments)     unknown  Other reaction(s):  Other (See Comments)  unknown    Wound Dressing Adhesive Other (See Comments)     unknown    Atarax [Hydroxyzine] Palpitations     Immunization History   Administered Date(s) Administered    INFLUENZA 12/02/2014    Tuberculin Skin Test-PPD Intradermal 12/17/2012       Objective     BP (!) 166/102 (BP Location: Left arm, Patient Position: Sitting, Cuff Size: Standard)   Pulse 73   Temp 98 1 °F (36 7 °C)   Ht 5' 4" (1 626 m)   Wt 78 kg (172 lb)   SpO2 97%   BMI 29 52 kg/m²     Physical Exam  Jeni Whalen MD

## 2022-09-28 ENCOUNTER — OFFICE VISIT (OUTPATIENT)
Dept: FAMILY MEDICINE CLINIC | Facility: CLINIC | Age: 47
End: 2022-09-28
Payer: COMMERCIAL

## 2022-09-28 VITALS
HEIGHT: 64 IN | DIASTOLIC BLOOD PRESSURE: 102 MMHG | TEMPERATURE: 98.1 F | BODY MASS INDEX: 29.37 KG/M2 | OXYGEN SATURATION: 97 % | WEIGHT: 172 LBS | HEART RATE: 73 BPM | SYSTOLIC BLOOD PRESSURE: 166 MMHG

## 2022-09-28 DIAGNOSIS — I10 ESSENTIAL HYPERTENSION: ICD-10-CM

## 2022-09-28 DIAGNOSIS — Z12.31 SCREENING MAMMOGRAM FOR HIGH-RISK PATIENT: ICD-10-CM

## 2022-09-28 DIAGNOSIS — K21.9 GASTROESOPHAGEAL REFLUX DISEASE WITHOUT ESOPHAGITIS: Primary | ICD-10-CM

## 2022-09-28 DIAGNOSIS — E78.2 MIXED HYPERLIPIDEMIA: ICD-10-CM

## 2022-09-28 DIAGNOSIS — F41.9 ANXIETY: ICD-10-CM

## 2022-09-28 DIAGNOSIS — F31.9 BIPOLAR 1 DISORDER (HCC): ICD-10-CM

## 2022-09-28 DIAGNOSIS — R94.39 ABNORMAL STRESS TEST: ICD-10-CM

## 2022-09-28 DIAGNOSIS — Z72.0 TOBACCO ABUSE: ICD-10-CM

## 2022-09-28 DIAGNOSIS — K21.9 GASTROESOPHAGEAL REFLUX DISEASE, UNSPECIFIED WHETHER ESOPHAGITIS PRESENT: ICD-10-CM

## 2022-09-28 PROCEDURE — 99214 OFFICE O/P EST MOD 30 MIN: CPT | Performed by: FAMILY MEDICINE

## 2022-09-28 RX ORDER — ALPRAZOLAM 0.25 MG/1
0.5 TABLET ORAL
Qty: 60 TABLET | Refills: 1 | Status: SHIPPED | OUTPATIENT
Start: 2022-09-28 | End: 2022-10-11 | Stop reason: SDUPTHER

## 2022-09-28 RX ORDER — LOSARTAN POTASSIUM 100 MG/1
100 TABLET ORAL DAILY
Qty: 30 TABLET | Refills: 1 | Status: SHIPPED | OUTPATIENT
Start: 2022-09-28

## 2022-09-28 RX ORDER — ESOMEPRAZOLE MAGNESIUM 40 MG/1
40 CAPSULE, DELAYED RELEASE ORAL
Qty: 60 CAPSULE | Refills: 3 | Status: SHIPPED | OUTPATIENT
Start: 2022-09-28

## 2022-10-06 ENCOUNTER — TELEPHONE (OUTPATIENT)
Dept: FAMILY MEDICINE CLINIC | Facility: CLINIC | Age: 47
End: 2022-10-06

## 2022-10-06 DIAGNOSIS — F33.41 RECURRENT MAJOR DEPRESSIVE DISORDER, IN PARTIAL REMISSION (HCC): ICD-10-CM

## 2022-10-06 DIAGNOSIS — F31.9 BIPOLAR 1 DISORDER (HCC): ICD-10-CM

## 2022-10-06 RX ORDER — PAROXETINE HYDROCHLORIDE 40 MG/1
40 TABLET, FILM COATED ORAL EVERY MORNING
Qty: 30 TABLET | Refills: 1 | Status: SHIPPED | OUTPATIENT
Start: 2022-10-06

## 2022-10-06 NOTE — TELEPHONE ENCOUNTER
Pt called requesting a refill on her paxil  Pt stated that she is having increased Bipolar Episodes  Pt has call into Dr Tali Shane for an appt       Please review and advise  [address]

## 2022-10-06 NOTE — TELEPHONE ENCOUNTER
Patient:   FAIZA RONDON            MRN: [id number]            FIN: [id number]              Age:   34 years     Sex:  MALE     :  [birth year]   Associated Diagnoses:   None   Author:   EDWINA ÁLVAREZ     Reason for Consultation:   Shortness of breath  History of Present Illness:   34-year-old male was spray painting his boat with a waterproofing chemical yesterday.  Soon thereafter he developed a nonproductive cough and in respiratory distress.  He was not wearing a mask.  He states that the package insert on the chemical did not recommend wearing a mask.  Prior to exposure patient had his usual chronic cough with clear mucus in the morning.  He has no fever chills.  Since admission he is feeling better.  He received  Solu-Medrol in the emergency room followed by Zithromax and ceftriaxone by the intensivist service.  He is still requiring 40% oxygen.  He denies any previous respiratory problems.  He is a current smoker  Past Medical History:   Smoking  Obstructive sleep apnea on CPAP of 10  Hypertension  Obesity  Gastroesophageal reflux  Past Surgical History:     Family History:   Noncontributory  Social History:   Current smoker, denies alcohol or drug use  Allergies:   Allergies (1) Active Reaction  NKA None Documented      Review of Systems:     All systems reviewed.  Pertinent positive findings as noted above    Medications (9) Active  Scheduled: (8)  *Potassium Protocol Communication  1 each, N/A, Daily  AmLODIPine 5 mg tab  5 mg 1 tab, Oral, Daily  Azithromycin 250 mg tab  500 mg 2 tab, Oral, Daily  cefTRIAXone  2,000 mg 20 mL, Slow IV Push, Q Bedtime  Heparin 5,000 unit/1 mL inj  5,000 unit 1 mL, Subcutaneous, Q12H  LamoTRIgine 100 mg tab  350 mg 3.5 tab, Oral, Daily  Pantoprazole 40 mg DR tab  40 mg 1 tab, Oral, Daily  Pneumococcal adult vaccine 23-polyvalent 0.5 mL IM inj SDV  0.5 mL, IM, On Call  Continuous: (1)  Sodium Chloride 0.9% 500 mL  500 mL, IV  PRN: (0)        Physical Examination  done   VS/Measurements       Vitals between:   19-MAY-2019 09:37:45   TO   20-MAY-2019 09:37:45                   LAST RESULT MINIMUM MAXIMUM  Temperature 36.8 36.4 37.4  Heart Rate 78 76 125  Respiratory Rate 25 21 42  NISBP           135 106 170  NIDBP           76 51 98  NIMBP           94 67 113  SpO2                    96 93 100  FiO2                    1 1 1      I & O between:  19-MAY-2019 09:37 TO 20-MAY-2019 09:37  Med Dosing Weight:  113.3  kg   19-MAY-2019  24 Hour Intake:   1998.16  ( 17.64 mL/kg )  24 Hour Output:   500.00           24 Hour Urine/Stool Output:   0.0  24 Hour Balance:   1498.16           24 Hour Urine Output:   500.00  ( 0.18 mL/kg/hr )      Dosing Weight:   113.3 kg    05/19/2019 20:39  Most Recent Clinical Weight:   112.3  kg    05/19/2019 23:00       General: Alert, oriented x3, no apparent distress     HEENT: Pupils equal reactive, sclera nonicteric, oropharynx clear, no lymphadenopathy     Respiratory: Coarse breath sounds with some rhonchi     Cardiovascular: S1-S2 normal     Abdomen: Soft, nontender, no organomegaly     Extremities: No edema, no clubbing     Neurological: Nonfocal      Lab Data:      Review / Management   Results review:       Labs between:  19-MAY-2019 09:37 to 20-MAY-2019 09:37    CBC:                 WBC  HgB  Hct  Plt  MCV  RDW   20-MAY-2019 (H) 19.2  15.5  43.8  203  94.6  12.6   19-MAY-2019 (H) 17.2  (H) 17.1  48.4  233  94.7  12.4     DIFF:                 Seg  Neutroph//ABS  Lymph//ABS  Mono//ABS  EOS/ABS  20-MAY-2019 NOT APPLICABLE  91 // (H) 17.6  5 // (L) 0.9  3 // 0.5 0 // (L) 0.0  19-MAY-2019 NOT APPLICABLE  75 // (H) 12.9  18 // 3.1 5 // 0.9 1 // 0.1    BMP:                 Na  Cl  BUN  Glu   20-MAY-2019 138  102  14  (H) 162                              K  CO2  Cr  Ca                              4.4  23  0.93  9.2   BMP:                 Na  Cl  BUN  Glu   19-MAY-2019 141  101  13  (H) 114                              K  CO2  Cr  Ca                               (L) 3.1  24  (H) 1.28  9.6     POC GLU:                 Latest Result  Latest Date  Minimum  Min Date  Maximum  Max Date                             (H) 132  20-MAY-2019 (H) 132  20-MAY-2019 (H) 130  19-MAY-2019    COAG:                 INR  PT  PTT  Ddimer  Fibrinogen    19-MAY-2019       0.26       Blood Gas:            Ph  PCO2  PO2  BiCarb  BaseExcess   Arterial:  20-MAY-2019 (H) 7.48  (L) 32  (H) 326  24  1                              Ionized Ca  Na  K  HgB  Lactic Acid                                    16.1                    ,   Labs between:  19-MAY-2019 09:37 to 20-MAY-2019 09:37    CBC:                 WBC  HgB  Hct  Plt  MCV  RDW   20-MAY-2019 (H) 19.2  15.5  43.8  203  94.6  12.6   19-MAY-2019 (H) 17.2  (H) 17.1  48.4  233  94.7  12.4     DIFF:                 Seg  Neutroph//ABS  Lymph//ABS  Mono//ABS  EOS/ABS  20-MAY-2019 NOT APPLICABLE  91 // (H) 17.6  5 // (L) 0.9  3 // 0.5 0 // (L) 0.0  19-MAY-2019 NOT APPLICABLE  75 // (H) 12.9  18 // 3.1 5 // 0.9 1 // 0.1    BMP:                 Na  Cl  BUN  Glu   20-MAY-2019 138  102  14  (H) 162                              K  CO2  Cr  Ca                              4.4  23  0.93  9.2   BMP:                 Na  Cl  BUN  Glu   19-MAY-2019 141  101  13  (H) 114                              K  CO2  Cr  Ca                              (L) 3.1  24  (H) 1.28  9.6     POC GLU:                 Latest Result  Latest Date  Minimum  Min Date  Maximum  Max Date                             (H) 132  20-MAY-2019 (H) 132  20-MAY-2019 (H) 130  19-MAY-2019    COAG:                 INR  PT  PTT  Ddimer  Fibrinogen    19-MAY-2019       0.26       Blood Gas:            Ph  PCO2  PO2  BiCarb  BaseExcess   Arterial:  20-MAY-2019 (H) 7.48  (L) 32  (H) 326  24  1                              Ionized Ca  Na  K  HgB  Lactic Acid                                    16.1                     .    Radiology results                 Result title:  XR CHEST PORTABLE 1V  Result status:   Final  Verified by:  GIACOMO DONOVAN on 05/19/2019 1:15  IMPRESSION: Low lung volumes.  Normal heart size.  Patchy opacities in the mid and lower lungs may represent crowded bronchovascular structures or atelectasis, although underlying infectious consolidations cannot be excluded.  No pleural effusions or pneumothorax.           Assessments:   Acute hypoxic respiratory failure  Chemical pneumonitis secondary to inhalational injury.  Infectious process less likely but patient is on empiric antibiotics because of leukocytosis  Question of underlying COPD secondary to long-term smoking  Obstructive sleep apnea  History of hypertension gastroesophageal reflux  Recommendations:   Continue Zithromax and ceftriaxone  Resume steroids  Follow-up chest x-ray and lab in a.m. including procalcitonin  Continue nasal CPAP of 10 CWP

## 2022-10-11 ENCOUNTER — OFFICE VISIT (OUTPATIENT)
Dept: CARDIOLOGY CLINIC | Facility: MEDICAL CENTER | Age: 47
End: 2022-10-11
Payer: COMMERCIAL

## 2022-10-11 ENCOUNTER — PREP FOR PROCEDURE (OUTPATIENT)
Dept: CARDIOLOGY CLINIC | Facility: CLINIC | Age: 47
End: 2022-10-11

## 2022-10-11 VITALS
DIASTOLIC BLOOD PRESSURE: 88 MMHG | WEIGHT: 170 LBS | OXYGEN SATURATION: 97 % | HEART RATE: 60 BPM | BODY MASS INDEX: 29.02 KG/M2 | HEIGHT: 64 IN | SYSTOLIC BLOOD PRESSURE: 160 MMHG

## 2022-10-11 DIAGNOSIS — I20.0 UNSTABLE ANGINA (HCC): Primary | ICD-10-CM

## 2022-10-11 DIAGNOSIS — Z72.0 TOBACCO ABUSE: ICD-10-CM

## 2022-10-11 DIAGNOSIS — I10 ESSENTIAL HYPERTENSION: Primary | ICD-10-CM

## 2022-10-11 DIAGNOSIS — I20.0 UNSTABLE ANGINA (HCC): ICD-10-CM

## 2022-10-11 DIAGNOSIS — E78.2 MIXED HYPERLIPIDEMIA: ICD-10-CM

## 2022-10-11 DIAGNOSIS — F41.9 ANXIETY: ICD-10-CM

## 2022-10-11 DIAGNOSIS — I44.7 LBBB (LEFT BUNDLE BRANCH BLOCK): ICD-10-CM

## 2022-10-11 PROCEDURE — 93000 ELECTROCARDIOGRAM COMPLETE: CPT | Performed by: INTERNAL MEDICINE

## 2022-10-11 PROCEDURE — 99203 OFFICE O/P NEW LOW 30 MIN: CPT | Performed by: INTERNAL MEDICINE

## 2022-10-11 RX ORDER — ASPIRIN 81 MG/1
81 TABLET ORAL DAILY
Qty: 90 TABLET | Refills: 3 | Status: SHIPPED | OUTPATIENT
Start: 2022-10-11

## 2022-10-11 NOTE — H&P (VIEW-ONLY)
Cardiology Consultation     Tha Cabello  [id number]  1975  [facility]  Marianna Hays [address] [place] [phone]      Diagnoses and all orders for this visit:    Essential hypertension    Tobacco abuse    Mixed hyperlipidemia        I had the pleasure of seeing Tha Cabello for a consultation regarding chest pain and left bundle branch block requested by Dr Rodriguez    History of the Presenting Illness, Discussion/Summary and my Plan are as follows:::    Pranay bhagat is a pleasant 77-year-old lady with hypertension, mild dyslipidemia, tobacco use-about 10 pack years, currently smoking a quarter pack a day, no alcohol use, prior drug use-methamphetamine and cocaine-5 in 13 years ago by injection but with negative infection workup  Family history-multiple family members with coronary artery disease-father had a myocardial infarction at 54, was a smoker, also needed CABG, mother had congestive heart failure, another brother  at 39 of a heart attack, was also smoker  She has a lot of stress at home, taking care of her brother with stage IV colon cancer along with multiple family members of his living in her house  She is mainly here for further evaluation of chest pains described as left forearm pain that radiates up to the neck, mostly brought on by emotional stress, sometimes also by physical exertion, more in the cold, associated this shortness of breath and sweating, for the last 1 year but slowly increasing  She was already stress test but was noted subsequently to have a left bundle-branch block which on review goes back to   Plan:    Chest pain:  Multiple risk factors, suggestive of unstable angina  Should proceed directly with coronary angiography  I will start her on aspirin 81 mg and metoprolol 25 b i d  She will continue her losartan without changes    [facility]    Hypertension:  Still elevated, she will continue losartan while adding metoprolol 25 b i d     Mild dyslipidemia:  No changes at this time, await results of coronary angiography      Tobacco cessation was advised, she has a lot of stress and is trying hard  Left bundle-branch block: Will check an echocardiogram also, based on prior report from [facility], this goes back to 2015  Follow-up in about 4-6 weeks      Latest Reference Range & Units 11/08/21 09:05   BUN 5 - 25 mg/dL 13   Creatinine 0 60 - 1 30 mg/dL 0 61        Latest Reference Range & Units 07/28/20 13:45   Cholesterol 100 - 199 mg/dL 200 (H)   Triglycerides 0 - 149 mg/dL 213 (H)   HDL >39 mg/dL 60   LDL Calculated 0 - 99 mg/dL 97   LDL CHOLESTEROL DIRECT 0 - 99 mg/dL 110 (H)   VLDL Cholesterol Crescencio 5 - 40 mg/dL 43 (H)   (H): Data is abnormally high     Latest Reference Range & Units 07/28/20 13:45   TSH, POC 0 450 - 4 500 uIU/mL 2 450   T4 TOTAL 4 5 - 12 0 ug/dL 6 8     1  Essential hypertension     2  Tobacco abuse     3   Mixed hyperlipidemia       Patient Active Problem List   Diagnosis   • Recurrent major depressive disorder, in partial remission (HCC)   • Dysuria   • Gastroesophageal reflux disease without esophagitis   • Genital labial ulcer   • Bipolar 1 disorder (HCC)   • Schizophrenia ([facility] [address] )   • Essential hypertension   • Other fatigue   • Tobacco abuse   • Mixed hyperlipidemia   • Well adult exam   • Finger infection   • Hematoma   • C  difficile colitis     Past Medical History:   Diagnosis Date   • Anxiety    • Bipolar 1 disorder ([facility] [address] ) 7/24/2020   • Bipolar 1 disorder ([facility] [address] ) 7/24/2020   • Bipolar disease, manic ([facility] [address] )    • Depression    • Gastroesophageal reflux disease without esophagitis 7/24/2020   • Schizophrenia ([facility] [address] )      Social History     Socioeconomic History   • Marital status: /Civil Union     Spouse name: Not on file   • Number of children: Not on file   • Years of education: Not on file   • Highest education level: Not on file   Occupational History   • Not on file   Tobacco Use   • Smoking status: Current Every Day Smoker     Packs/day: 0 25     Types: Cigarettes   • Smokeless tobacco: Never Used   Vaping Use   • Vaping Use: Never used   Substance and Sexual Activity   • Alcohol use: Not Currently   • Drug use: Not Currently     Types: Methamphetamines   • Sexual activity: Not on file   Other Topics Concern   • Not on file   Social History Narrative    · Do you currently or have you served in the [employer]:   No      · Were you activated, into active duty, as a member of the [employer] or as a Reservist:   No        · Exercise level:   None      · Diet:   Regular      · General stress level:   High       · Caffeine intake: Moderate        · Seat belts used routinely:   Yes      · Smoke alarm in home:   Yes      Social Determinants of Health     Financial Resource Strain: Not on file   Food Insecurity: Food Insecurity Present   • Worried About Running Out of Food in the Last Year: Sometimes true   • Ran Out of Food in the Last Year: Sometimes true   Transportation Needs: No Transportation Needs   • Lack of Transportation (Medical): No   • Lack of Transportation (Non-Medical):  No   Physical Activity: Not on file   Stress: Not on file   Social Connections: Not on file   Intimate Partner Violence: Not on file   Housing Stability: High Risk   • Unable to Pay for Housing in the Last Year: Yes   • Number of Places Lived in the Last Year: 2   • Unstable Housing in the Last Year: Yes      Family History   Problem Relation Age of Onset   • Heart attack Mother    • Heart disease Mother    • Heart failure Father    • COPD Father    • Heart disease Brother    • Colon cancer Brother    • Liver cancer Brother    • Heart attack Maternal Uncle      Past Surgical History:   Procedure Laterality Date   •  SECTION      x2   • HYSTERECTOMY     • KNEE SURGERY Left     4 times   • LEG SURGERY Right thony placed then removed, broken femur   • NEUROPLASTY / TRANSPOSITION MEDIAN NERVE AT CARPAL TUNNEL         Current Outpatient Medications:   •  ALPRAZolam (XANAX) 0 25 mg tablet, Take 2 tablets (0 5 mg total) by mouth daily at bedtime as needed for anxiety, Disp: 60 tablet, Rfl: 1  •  esomeprazole (NexIUM) 40 MG capsule, Take 1 capsule (40 mg total) by mouth 2 (two) times a day before meals, Disp: 60 capsule, Rfl: 3  •  losartan (Cozaar) 100 MG tablet, Take 1 tablet (100 mg total) by mouth daily, Disp: 30 tablet, Rfl: 1  •  PARoxetine (PAXIL) 40 MG tablet, Take 1 tablet (40 mg total) by mouth every morning, Disp: 30 tablet, Rfl: 1  •  acetaminophen (TYLENOL) 325 mg tablet, Take 2 tablets (650 mg total) by mouth every 6 (six) hours as needed for mild pain, headaches or fever (Patient not taking: Reported on 10/11/2022), Disp: , Rfl: 0  •  menthol-zinc oxide (CALMOSPETINE) 0 44-20 625 %, Apply topically 2 (two) times a day (Patient not taking: No sig reported), Disp: 113 g, Rfl: 3  •  naproxen (NAPROSYN) 500 mg tablet, take 1 tablet by mouth twice a day with meals (Patient not taking: Reported on 10/11/2022), Disp: 20 tablet, Rfl: 1  •  potassium chloride (K-DUR,KLOR-CON) 20 mEq tablet, Take 1 tablet (20 mEq total) by mouth daily for 7 days (Patient not taking: Reported on 10/11/2022), Disp: 7 tablet, Rfl: 0  •  QUEtiapine (SEROquel) 300 mg tablet, Take 1 tablet (300 mg total) by mouth daily at bedtime (Patient not taking: No sig reported), Disp: 10 tablet, Rfl: 0  •  valsartan (DIOVAN) 320 MG tablet, TAKE 1 TABLET BY MOUTH EVERY DAY (Patient not taking: No sig reported), Disp: 30 tablet, Rfl: 1  Allergies   Allergen Reactions   • Ciprofloxacin Hives   • Other Other (See Comments)     unknown  Other reaction(s):  Other (See Comments)  unknown   • Wound Dressing Adhesive Other (See Comments)     unknown   • Atarax [Hydroxyzine] Palpitations     Vitals:    10/11/22 1008   BP: 160/88   BP Location: Left arm   Patient Position: Sitting   Cuff Size: Adult   Pulse: 60   SpO2: 97%   Weight: 77 1 kg (170 lb)   Height: 5' 4" (1 626 m)         Imaging: No results found  Review of Systems:  Review of Systems   Constitutional: Negative  HENT: Negative  Eyes: Negative  Respiratory: Negative  Cardiovascular: Positive for chest pain  Negative for palpitations  Endocrine: Negative  Musculoskeletal: Negative  Physical Exam:    /88 (BP Location: Left arm, Patient Position: Sitting, Cuff Size: Adult)   Pulse 60   Ht 5' 4" (1 626 m)   Wt 77 1 kg (170 lb)   SpO2 97%   BMI 29 18 kg/m²   Physical Exam  Constitutional:       General: She is not in acute distress  Appearance: She is well-developed  She is not ill-appearing  Neck:      Thyroid: No thyromegaly  Vascular: No hepatojugular reflux or JVD  Cardiovascular:      Rate and Rhythm: Normal rate  Pulses:           Carotid pulses are 2+ on the right side and 2+ on the left side  Pulmonary:      Effort: Pulmonary effort is normal  No tachypnea or respiratory distress  Breath sounds: Normal breath sounds  No stridor  No decreased breath sounds, wheezing or rhonchi  Chest:      Chest wall: No mass, deformity, tenderness or crepitus  Abdominal:      General: Bowel sounds are normal  There is no abdominal bruit  Palpations: Abdomen is soft  There is no fluid wave, hepatomegaly or splenomegaly  Musculoskeletal:      Cervical back: Normal range of motion  Right lower leg: No edema  Left lower leg: No edema  Lymphadenopathy:      Cervical: No cervical adenopathy  Neurological:      Mental Status: She is alert  This note was completed in part utilizing [employer] direct voice recognition software     Grammatical errors, random word insertion, spelling mistakes, occasional wrong word or "sound-alike" substitutions and incomplete sentences may be an occasional consequence of the system secondary to software limitations, ambient noise and hardware issues  At the time of dictation, efforts were made to edit, clarify and /or correct errors  Please read the chart carefully and recognize, using context, where substitutions have occurred  If you have any questions or concerns about the context, text or information contained within the body of this dictation, please contact myself, the provider, for further clarification

## 2022-10-11 NOTE — PROGRESS NOTES
Cardiology Consultation     Alessandra Wolfe  [id number]  1975  [facility] [place]  Marianna Hays [address] [place] [id number]      Diagnoses and all orders for this visit:    Essential hypertension    Tobacco abuse    Mixed hyperlipidemia        I had the pleasure of seeing Alessandra Wolfe for a consultation regarding chest pain and left bundle branch block requested by Dr Rodriguez    History of the Presenting Illness, Discussion/Summary and my Plan are as follows:::    Shasha bhagat is a pleasant 43-year-old lady with hypertension, mild dyslipidemia, tobacco use-about 10 pack years, currently smoking a quarter pack a day, no alcohol use, prior drug use-methamphetamine and cocaine-5 in 13 years ago by injection but with negative infection workup  Family history-multiple family members with coronary artery disease-father had a myocardial infarction at 54, was a smoker, also needed CABG, mother had congestive heart failure, another brother  at 39 of a heart attack, was also smoker  She has a lot of stress at home, taking care of her brother with stage IV colon cancer along with multiple family members of his living in her house  She is mainly here for further evaluation of chest pains described as left forearm pain that radiates up to the neck, mostly brought on by emotional stress, sometimes also by physical exertion, more in the cold, associated this shortness of breath and sweating, for the last 1 year but slowly increasing  She was already stress test but was noted subsequently to have a left bundle-branch block which on review goes back to   Plan:    Chest pain:  Multiple risk factors, suggestive of unstable angina  Should proceed directly with coronary angiography  I will start her on aspirin 81 mg and metoprolol 25 b i d  She will continue her losartan without changes    This will [facility] up at [facility]    Hypertension:  Still elevated, she will continue losartan while adding metoprolol 25 b i d     Mild dyslipidemia:  No changes at this time, await results of coronary angiography      Tobacco cessation was advised, she has a lot of stress and is trying hard  Left bundle-branch block: Will check an echocardiogram also, based on prior report from [facility], this goes back to 2015  Follow-up in about 4-6 weeks      Latest Reference Range & Units 11/08/21 09:05   BUN 5 - 25 mg/dL 13   Creatinine 0 60 - 1 30 mg/dL 0 61        Latest Reference Range & Units 07/28/20 13:45   Cholesterol 100 - 199 mg/dL 200 (H)   Triglycerides 0 - 149 mg/dL 213 (H)   HDL >39 mg/dL 60   LDL Calculated 0 - 99 mg/dL 97   LDL CHOLESTEROL DIRECT 0 - 99 mg/dL 110 (H)   VLDL Cholesterol Crescencio 5 - 40 mg/dL 43 (H)   (H): Data is abnormally high     Latest Reference Range & Units 07/28/20 13:45   TSH, POC 0 450 - 4 500 uIU/mL 2 450   T4 TOTAL 4 5 - 12 0 ug/dL 6 8     1  Essential hypertension     2  Tobacco abuse     3   Mixed hyperlipidemia       Patient Active Problem List   Diagnosis   • Recurrent major depressive disorder, in partial remission (HCC)   • Dysuria   • Gastroesophageal reflux disease without esophagitis   • Genital labial ulcer   • Bipolar 1 disorder (HCC)   • Schizophrenia ([facility] [address] )   • Essential hypertension   • Other fatigue   • Tobacco abuse   • Mixed hyperlipidemia   • Well adult exam   • Finger infection   • Hematoma   • C  difficile colitis     Past Medical History:   Diagnosis Date   • Anxiety    • Bipolar 1 disorder ([facility] [address] ) 7/24/2020   • Bipolar 1 disorder ([facility] [address] ) 7/24/2020   • Bipolar disease, manic ([facility] [address] )    • Depression    • Gastroesophageal reflux disease without esophagitis 7/24/2020   • Schizophrenia ([facility] [address] )      Social History     Socioeconomic History   • Marital status: /Civil Union     Spouse name: Not on file   • Number of children: Not on file   • Years of education: Not on file   • Highest education level: Not on file   Occupational History   • Not on file   Tobacco Use   • Smoking status: Current Every Day Smoker     Packs/day: 0 25     Types: Cigarettes   • Smokeless tobacco: Never Used   Vaping Use   • Vaping Use: Never used   Substance and Sexual Activity   • Alcohol use: Not Currently   • Drug use: Not Currently     Types: Methamphetamines   • Sexual activity: Not on file   Other Topics Concern   • Not on file   Social History Narrative    · Do you currently or have you served in the [facility] [place] [address]:   No      · Were you activated, into active duty, as a member of the [employer] or as a Reservist:   No        · Exercise level:   None      · Diet:   Regular      · General stress level:   High       · Caffeine intake: Moderate        · Seat belts used routinely:   Yes      · Smoke alarm in home:   Yes      Social Determinants of Health     Financial Resource Strain: Not on file   Food Insecurity: Food Insecurity Present   • Worried About Running Out of Food in the Last Year: Sometimes true   • Ran Out of Food in the Last Year: Sometimes true   Transportation Needs: No Transportation Needs   • Lack of Transportation (Medical): No   • Lack of Transportation (Non-Medical):  No   Physical Activity: Not on file   Stress: Not on file   Social Connections: Not on file   Intimate Partner Violence: Not on file   Housing Stability: High Risk   • Unable to Pay for Housing in the Last Year: Yes   • Number of Places Lived in the Last Year: 2   • Unstable Housing in the Last Year: Yes      Family History   Problem Relation Age of Onset   • Heart attack Mother    • Heart disease Mother    • Heart failure Father    • COPD Father    • Heart disease Brother    • Colon cancer Brother    • Liver cancer Brother    • Heart attack Maternal Uncle      Past Surgical History:   Procedure Laterality Date   •  SECTION      x2   • HYSTERECTOMY     • KNEE SURGERY Left     4 times   • LEG SURGERY Right thony placed then removed, broken femur   • NEUROPLASTY / TRANSPOSITION MEDIAN NERVE AT CARPAL TUNNEL         Current Outpatient Medications:   •  ALPRAZolam (XANAX) 0 25 mg tablet, Take 2 tablets (0 5 mg total) by mouth daily at bedtime as needed for anxiety, Disp: 60 tablet, Rfl: 1  •  esomeprazole (NexIUM) 40 MG capsule, Take 1 capsule (40 mg total) by mouth 2 (two) times a day before meals, Disp: 60 capsule, Rfl: 3  •  losartan (Cozaar) 100 MG tablet, Take 1 tablet (100 mg total) by mouth daily, Disp: 30 tablet, Rfl: 1  •  PARoxetine (PAXIL) 40 MG tablet, Take 1 tablet (40 mg total) by mouth every morning, Disp: 30 tablet, Rfl: 1  •  acetaminophen (TYLENOL) 325 mg tablet, Take 2 tablets (650 mg total) by mouth every 6 (six) hours as needed for mild pain, headaches or fever (Patient not taking: Reported on 10/11/2022), Disp: , Rfl: 0  •  menthol-zinc oxide (CALMOSPETINE) 0 44-20 625 %, Apply topically 2 (two) times a day (Patient not taking: No sig reported), Disp: 113 g, Rfl: 3  •  naproxen (NAPROSYN) 500 mg tablet, take 1 tablet by mouth twice a day with meals (Patient not taking: Reported on 10/11/2022), Disp: 20 tablet, Rfl: 1  •  potassium chloride (K-DUR,KLOR-CON) 20 mEq tablet, Take 1 tablet (20 mEq total) by mouth daily for 7 days (Patient not taking: Reported on 10/11/2022), Disp: 7 tablet, Rfl: 0  •  QUEtiapine (SEROquel) 300 mg tablet, Take 1 tablet (300 mg total) by mouth daily at bedtime (Patient not taking: No sig reported), Disp: 10 tablet, Rfl: 0  •  valsartan (DIOVAN) 320 MG tablet, TAKE 1 TABLET BY MOUTH EVERY DAY (Patient not taking: No sig reported), Disp: 30 tablet, Rfl: 1  Allergies   Allergen Reactions   • Ciprofloxacin Hives   • Other Other (See Comments)     unknown  Other reaction(s):  Other (See Comments)  unknown   • Wound Dressing Adhesive Other (See Comments)     unknown   • Atarax [Hydroxyzine] Palpitations     Vitals:    10/11/22 1008   BP: 160/88   BP Location: Left arm   Patient Position: Sitting   Cuff Size: Adult   Pulse: 60   SpO2: 97%   Weight: 77 1 kg (170 lb)   Height: 5' 4" (1 626 m)         Imaging: No results found  Review of Systems:  Review of Systems   Constitutional: Negative  HENT: Negative  Eyes: Negative  Respiratory: Negative  Cardiovascular: Positive for chest pain  Negative for palpitations  Endocrine: Negative  Musculoskeletal: Negative  Physical Exam:    /88 (BP Location: Left arm, Patient Position: Sitting, Cuff Size: Adult)   Pulse 60   Ht 5' 4" (1 626 m)   Wt 77 1 kg (170 lb)   SpO2 97%   BMI 29 18 kg/m²   Physical Exam  Constitutional:       General: She is not in acute distress  Appearance: She is well-developed  She is not ill-appearing  Neck:      Thyroid: No thyromegaly  Vascular: No hepatojugular reflux or JVD  Cardiovascular:      Rate and Rhythm: Normal rate  Pulses:           Carotid pulses are 2+ on the right side and 2+ on the left side  Pulmonary:      Effort: Pulmonary effort is normal  No tachypnea or respiratory distress  Breath sounds: Normal breath sounds  No stridor  No decreased breath sounds, wheezing or rhonchi  Chest:      Chest wall: No mass, deformity, tenderness or crepitus  Abdominal:      General: Bowel sounds are normal  There is no abdominal bruit  Palpations: Abdomen is soft  There is no fluid wave, hepatomegaly or splenomegaly  Musculoskeletal:      Cervical back: Normal range of motion  Right lower leg: No edema  Left lower leg: No edema  Lymphadenopathy:      Cervical: No cervical adenopathy  Neurological:      Mental Status: She is alert  This note was completed in part utilizing [employer] direct voice recognition software     Grammatical errors, random word insertion, spelling mistakes, occasional wrong word or "sound-alike" substitutions and incomplete sentences may be an occasional consequence of the system secondary to software limitations, ambient noise and hardware issues  At the time of dictation, efforts were made to edit, clarify and /or correct errors  Please read the chart carefully and recognize, using context, where substitutions have occurred  If you have any questions or concerns about the context, text or information contained within the body of this dictation, please contact myself, the provider, for further clarification

## 2022-10-12 ENCOUNTER — PREP FOR PROCEDURE (OUTPATIENT)
Dept: CARDIOLOGY CLINIC | Facility: CLINIC | Age: 47
End: 2022-10-12

## 2022-10-12 ENCOUNTER — TELEPHONE (OUTPATIENT)
Dept: CARDIOLOGY CLINIC | Facility: CLINIC | Age: 47
End: 2022-10-12

## 2022-10-12 RX ORDER — ALPRAZOLAM 0.25 MG/1
0.5 TABLET ORAL
Qty: 60 TABLET | Refills: 0 | Status: SHIPPED | OUTPATIENT
Start: 2022-10-12

## 2022-10-12 NOTE — TELEPHONE ENCOUNTER
Prescreening in progress  Pt aware labs  And meds hold  Hold losartan the morning of the procedure  [facility]

## 2022-10-12 NOTE — TELEPHONE ENCOUNTER
----- Message from Nelson Carrera sent at 10/11/2022  5:53 PM EDT -----  Son Rizzo,   Can you please set this patient up for a cath at [facility] w/ Dr Akiko Saunders        Thank you!   ----- Message -----  From: Justina George MA  Sent: 10/11/2022   4:19 PM EDT  To: Jevon Oneal MA      ----- Message -----  From: Griselda Barba, MD  Sent: 10/11/2022  10:58 AM EDT  To: Cardiology Surgery Coordinator    Needs cath - at [facility]    Please set up with Dr Sharon Devi

## 2022-10-18 ENCOUNTER — PREP FOR PROCEDURE (OUTPATIENT)
Dept: CARDIOLOGY CLINIC | Facility: CLINIC | Age: 47
End: 2022-10-18

## 2022-10-18 DIAGNOSIS — I20.0 UNSTABLE ANGINA (HCC): Primary | ICD-10-CM

## 2022-10-20 ENCOUNTER — APPOINTMENT (OUTPATIENT)
Dept: LAB | Facility: HOSPITAL | Age: 47
End: 2022-10-20
Payer: COMMERCIAL

## 2022-10-20 DIAGNOSIS — I20.0 UNSTABLE ANGINA (HCC): ICD-10-CM

## 2022-10-20 LAB
ANION GAP SERPL CALCULATED.3IONS-SCNC: 8 MMOL/L (ref 4–13)
BUN SERPL-MCNC: 12 MG/DL (ref 5–25)
CALCIUM SERPL-MCNC: 9.5 MG/DL (ref 8.3–10.1)
CHLORIDE SERPL-SCNC: 103 MMOL/L (ref 96–108)
CO2 SERPL-SCNC: 29 MMOL/L (ref 21–32)
CREAT SERPL-MCNC: 0.93 MG/DL (ref 0.6–1.3)
ERYTHROCYTE [DISTWIDTH] IN BLOOD BY AUTOMATED COUNT: 12.2 % (ref 11.6–15.1)
GFR SERPL CREATININE-BSD FRML MDRD: 72 ML/MIN/1.73SQ M
GLUCOSE P FAST SERPL-MCNC: 120 MG/DL (ref 65–99)
HCT VFR BLD AUTO: 47.8 % (ref 34.8–46.1)
HGB BLD-MCNC: 16.1 G/DL (ref 11.5–15.4)
INR PPP: 0.96 (ref 0.84–1.19)
MCH RBC QN AUTO: 30 PG (ref 26.8–34.3)
MCHC RBC AUTO-ENTMCNC: 33.7 G/DL (ref 31.4–37.4)
MCV RBC AUTO: 89 FL (ref 82–98)
PLATELET # BLD AUTO: 250 THOUSANDS/UL (ref 149–390)
PMV BLD AUTO: 9.8 FL (ref 8.9–12.7)
POTASSIUM SERPL-SCNC: 3.9 MMOL/L (ref 3.5–5.3)
PROTHROMBIN TIME: 12.6 SECONDS (ref 11.6–14.5)
RBC # BLD AUTO: 5.37 MILLION/UL (ref 3.81–5.12)
SODIUM SERPL-SCNC: 140 MMOL/L (ref 135–147)
WBC # BLD AUTO: 8.07 THOUSAND/UL (ref 4.31–10.16)

## 2022-10-20 PROCEDURE — 85610 PROTHROMBIN TIME: CPT

## 2022-10-20 PROCEDURE — 85027 COMPLETE CBC AUTOMATED: CPT

## 2022-10-20 PROCEDURE — 36415 COLL VENOUS BLD VENIPUNCTURE: CPT

## 2022-10-20 PROCEDURE — 80048 BASIC METABOLIC PNL TOTAL CA: CPT

## 2022-10-24 ENCOUNTER — HOSPITAL ENCOUNTER (OUTPATIENT)
Facility: HOSPITAL | Age: 47
Setting detail: OUTPATIENT SURGERY
Discharge: HOME | End: 2022-10-24
Attending: INTERNAL MEDICINE | Admitting: INTERNAL MEDICINE
Payer: COMMERCIAL

## 2022-10-24 VITALS
HEIGHT: 64 IN | BODY MASS INDEX: 29.73 KG/M2 | SYSTOLIC BLOOD PRESSURE: 164 MMHG | HEART RATE: 59 BPM | RESPIRATION RATE: 21 BRPM | OXYGEN SATURATION: 93 % | WEIGHT: 174.16 LBS | DIASTOLIC BLOOD PRESSURE: 73 MMHG | TEMPERATURE: 97.8 F

## 2022-10-24 DIAGNOSIS — I20.0 UNSTABLE ANGINA (HCC): ICD-10-CM

## 2022-10-24 PROCEDURE — 99152 MOD SED SAME PHYS/QHP 5/>YRS: CPT | Performed by: INTERNAL MEDICINE

## 2022-10-24 PROCEDURE — C1769 GUIDE WIRE: HCPCS | Performed by: INTERNAL MEDICINE

## 2022-10-24 PROCEDURE — 99153 MOD SED SAME PHYS/QHP EA: CPT | Performed by: INTERNAL MEDICINE

## 2022-10-24 PROCEDURE — 93454 CORONARY ARTERY ANGIO S&I: CPT | Performed by: INTERNAL MEDICINE

## 2022-10-24 PROCEDURE — C1894 INTRO/SHEATH, NON-LASER: HCPCS | Performed by: INTERNAL MEDICINE

## 2022-10-24 RX ORDER — VERAPAMIL HCL 2.5 MG/ML
AMPUL (ML) INTRAVENOUS AS NEEDED
Status: DISCONTINUED | OUTPATIENT
Start: 2022-10-24 | End: 2022-10-24 | Stop reason: HOSPADM

## 2022-10-24 RX ORDER — ACETAMINOPHEN 325 MG/1
650 TABLET ORAL ONCE
Status: COMPLETED | OUTPATIENT
Start: 2022-10-24 | End: 2022-10-24

## 2022-10-24 RX ORDER — NITROGLYCERIN 20 MG/100ML
INJECTION INTRAVENOUS AS NEEDED
Status: DISCONTINUED | OUTPATIENT
Start: 2022-10-24 | End: 2022-10-24 | Stop reason: HOSPADM

## 2022-10-24 RX ORDER — SODIUM CHLORIDE 9 MG/ML
100 INJECTION, SOLUTION INTRAVENOUS CONTINUOUS
Status: DISCONTINUED | OUTPATIENT
Start: 2022-10-24 | End: 2022-10-24 | Stop reason: HOSPADM

## 2022-10-24 RX ORDER — MIDAZOLAM HYDROCHLORIDE 2 MG/2ML
INJECTION, SOLUTION INTRAMUSCULAR; INTRAVENOUS AS NEEDED
Status: DISCONTINUED | OUTPATIENT
Start: 2022-10-24 | End: 2022-10-24 | Stop reason: HOSPADM

## 2022-10-24 RX ORDER — ACETAMINOPHEN 325 MG/1
TABLET ORAL
Status: DISCONTINUED
Start: 2022-10-24 | End: 2022-10-24 | Stop reason: HOSPADM

## 2022-10-24 RX ORDER — HEPARIN SODIUM 1000 [USP'U]/ML
INJECTION, SOLUTION INTRAVENOUS; SUBCUTANEOUS AS NEEDED
Status: DISCONTINUED | OUTPATIENT
Start: 2022-10-24 | End: 2022-10-24 | Stop reason: HOSPADM

## 2022-10-24 RX ORDER — LIDOCAINE HYDROCHLORIDE 10 MG/ML
INJECTION, SOLUTION EPIDURAL; INFILTRATION; INTRACAUDAL; PERINEURAL AS NEEDED
Status: DISCONTINUED | OUTPATIENT
Start: 2022-10-24 | End: 2022-10-24 | Stop reason: HOSPADM

## 2022-10-24 RX ORDER — FENTANYL CITRATE 50 UG/ML
INJECTION, SOLUTION INTRAMUSCULAR; INTRAVENOUS AS NEEDED
Status: DISCONTINUED | OUTPATIENT
Start: 2022-10-24 | End: 2022-10-24 | Stop reason: HOSPADM

## 2022-10-24 RX ADMIN — ACETAMINOPHEN 650 MG: 325 TABLET, FILM COATED ORAL at 11:17

## 2022-10-24 NOTE — Clinical Note
Defib pad site: RCW/L flank  Defib pad site assessment: skin integrity intact  Detail Level: Zone Samples Given: Aveeno Oat repairing cream

## 2022-10-24 NOTE — INTERVAL H&P NOTE
Update: (This section must be completed if the H&P was completed greater than 24 hrs to procedure or admission)    H&P reviewed  After examining the patient, I find no changed to the H&P since it had been written  I have discussed in detail with patient and family regarding the indications, alternatives, risks and benefit of cardiac catheterization and possible PCI  The procedure risks, benefits, and complications (including but not limited to bleeding, infection, arrhythmia, nephrotoxicity, vessel injury, myocardial infarction, CVA, and death) were reviewed  Patient is alert and oriented x3 and wishes to proceed  All questions answered  Patient re-evaluated   Accept as history and physical     Velia Montez/October 24, 2022/8:06 AM

## 2022-11-07 ENCOUNTER — HOSPITAL ENCOUNTER (OUTPATIENT)
Dept: NON INVASIVE DIAGNOSTICS | Facility: CLINIC | Age: 47
Discharge: HOME | End: 2022-11-07

## 2022-11-07 VITALS
DIASTOLIC BLOOD PRESSURE: 73 MMHG | SYSTOLIC BLOOD PRESSURE: 164 MMHG | HEIGHT: 64 IN | WEIGHT: 174 LBS | HEART RATE: 58 BPM | BODY MASS INDEX: 29.71 KG/M2

## 2022-11-07 DIAGNOSIS — I10 ESSENTIAL HYPERTENSION: ICD-10-CM

## 2022-11-07 DIAGNOSIS — I44.7 LBBB (LEFT BUNDLE BRANCH BLOCK): ICD-10-CM

## 2022-11-07 LAB
AORTIC ROOT: 2.8 CM
AORTIC VALVE MEAN VELOCITY: 12.9 M/S
APICAL FOUR CHAMBER EJECTION FRACTION: 34 %
AV LVOT MEAN GRADIENT: 3 MMHG
AV LVOT PEAK GRADIENT: 7 MMHG
AV MEAN GRADIENT: 7 MMHG
AV PEAK GRADIENT: 12 MMHG
AV VELOCITY RATIO: 0.77
DOP CALC AO PEAK VEL: 1.72 M/S
DOP CALC AO VTI: 33.78 CM
DOP CALC LVOT PEAK VEL VTI: 30.33 CM
DOP CALC LVOT PEAK VEL: 1.33 M/S
E WAVE DECELERATION TIME: 354 MS
FRACTIONAL SHORTENING: 23 (ref 28–44)
INTERVENTRICULAR SEPTUM IN DIASTOLE (PARASTERNAL SHORT AXIS VIEW): 1 CM
INTERVENTRICULAR SEPTUM: 1 CM (ref 0.6–1.1)
LAAS-AP2: 23.8 CM2
LAAS-AP4: 25.3 CM2
LEFT ATRIUM AREA SYSTOLE SINGLE PLANE A4C: 23.5 CM2
LEFT ATRIUM SIZE: 4.6 CM
LEFT INTERNAL DIMENSION IN SYSTOLE: 4.3 CM (ref 2.1–4)
LEFT VENTRICULAR INTERNAL DIMENSION IN DIASTOLE: 5.6 CM (ref 3.5–6)
LEFT VENTRICULAR POSTERIOR WALL IN END DIASTOLE: 1.1 CM
LEFT VENTRICULAR STROKE VOLUME: 72 ML
LVSV (TEICH): 72 ML
MV E'TISSUE VEL-LAT: 8 CM/S
MV E'TISSUE VEL-SEP: 6 CM/S
MV PEAK A VEL: 0.84 M/S
MV PEAK E VEL: 97 CM/S
MV STENOSIS PRESSURE HALF TIME: 103 MS
MV VALVE AREA P 1/2 METHOD: 2.14
RIGHT ATRIUM AREA SYSTOLE A4C: 19.4 CM2
RIGHT VENTRICLE ID DIMENSION: 3.8 CM
SL CV LEFT ATRIUM LENGTH A2C: 6.2 CM
SL CV LV EF: 40
SL CV PED ECHO LEFT VENTRICLE DIASTOLIC VOLUME (MOD BIPLANE) 2D: 156 ML
SL CV PED ECHO LEFT VENTRICLE SYSTOLIC VOLUME (MOD BIPLANE) 2D: 85 ML

## 2022-11-09 ENCOUNTER — OFFICE VISIT (OUTPATIENT)
Dept: FAMILY MEDICINE CLINIC | Facility: CLINIC | Age: 47
End: 2022-11-09

## 2022-11-09 ENCOUNTER — OFFICE VISIT (OUTPATIENT)
Dept: CARDIOLOGY CLINIC | Facility: MEDICAL CENTER | Age: 47
End: 2022-11-09

## 2022-11-09 VITALS
SYSTOLIC BLOOD PRESSURE: 140 MMHG | DIASTOLIC BLOOD PRESSURE: 84 MMHG | HEART RATE: 60 BPM | WEIGHT: 175 LBS | OXYGEN SATURATION: 97 % | TEMPERATURE: 98.2 F | BODY MASS INDEX: 29.88 KG/M2 | HEIGHT: 64 IN

## 2022-11-09 VITALS
HEART RATE: 70 BPM | WEIGHT: 177.6 LBS | DIASTOLIC BLOOD PRESSURE: 72 MMHG | OXYGEN SATURATION: 94 % | BODY MASS INDEX: 30.48 KG/M2 | SYSTOLIC BLOOD PRESSURE: 120 MMHG

## 2022-11-09 DIAGNOSIS — Z12.31 SCREENING MAMMOGRAM FOR HIGH-RISK PATIENT: Primary | ICD-10-CM

## 2022-11-09 DIAGNOSIS — I10 ESSENTIAL HYPERTENSION: ICD-10-CM

## 2022-11-09 DIAGNOSIS — I20.0 UNSTABLE ANGINA (HCC): ICD-10-CM

## 2022-11-09 DIAGNOSIS — Z72.0 TOBACCO ABUSE: ICD-10-CM

## 2022-11-09 DIAGNOSIS — E78.2 MIXED HYPERLIPIDEMIA: ICD-10-CM

## 2022-11-09 DIAGNOSIS — E78.5 DYSLIPIDEMIA, GOAL LDL BELOW 70: ICD-10-CM

## 2022-11-09 DIAGNOSIS — I42.8 NON-ISCHEMIC CARDIOMYOPATHY (HCC): ICD-10-CM

## 2022-11-09 DIAGNOSIS — F31.9 BIPOLAR 1 DISORDER (HCC): ICD-10-CM

## 2022-11-09 DIAGNOSIS — F41.9 ANXIETY: ICD-10-CM

## 2022-11-09 DIAGNOSIS — G47.00 INSOMNIA, UNSPECIFIED TYPE: ICD-10-CM

## 2022-11-09 DIAGNOSIS — F33.41 RECURRENT MAJOR DEPRESSIVE DISORDER, IN PARTIAL REMISSION (HCC): ICD-10-CM

## 2022-11-09 DIAGNOSIS — I10 ESSENTIAL HYPERTENSION: Primary | ICD-10-CM

## 2022-11-09 RX ORDER — LOSARTAN POTASSIUM 100 MG/1
100 TABLET ORAL DAILY
Qty: 90 TABLET | Refills: 1 | Status: SHIPPED | OUTPATIENT
Start: 2022-11-09 | End: 2022-11-09 | Stop reason: ALTCHOICE

## 2022-11-09 RX ORDER — MIRTAZAPINE 15 MG/1
15 TABLET, FILM COATED ORAL
Qty: 30 TABLET | Refills: 5 | Status: SHIPPED | OUTPATIENT
Start: 2022-11-09

## 2022-11-09 RX ORDER — ALPRAZOLAM 0.5 MG/1
0.5 TABLET ORAL 3 TIMES DAILY PRN
Qty: 90 TABLET | Refills: 1 | Status: SHIPPED | OUTPATIENT
Start: 2022-11-09

## 2022-11-09 RX ORDER — ATORVASTATIN CALCIUM 20 MG/1
20 TABLET, FILM COATED ORAL DAILY
Qty: 60 TABLET | Refills: 6 | Status: SHIPPED | OUTPATIENT
Start: 2022-11-09

## 2022-11-09 RX ORDER — SACUBITRIL AND VALSARTAN 24; 26 MG/1; MG/1
1 TABLET, FILM COATED ORAL 2 TIMES DAILY
Qty: 120 TABLET | Refills: 4 | Status: SHIPPED | OUTPATIENT
Start: 2022-11-09

## 2022-11-09 RX ORDER — PAROXETINE HYDROCHLORIDE 40 MG/1
40 TABLET, FILM COATED ORAL EVERY MORNING
Qty: 90 TABLET | Refills: 1 | Status: SHIPPED | OUTPATIENT
Start: 2022-11-09

## 2022-11-09 NOTE — PROGRESS NOTES
Cardiology Follow Up    Amanda Daniel  1975  [id number]  [facility] [place] [address] [address]  [phone]    1  Essential hypertension     2  Unstable angina ([address] )     3  Tobacco abuse         Diagnoses and all orders for this visit:    Essential hypertension    Unstable angina (HCC)    Tobacco abuse      I had the pleasure of seeing Amanda Daniel for a follow up visit  INTERVAL HISTORY: Neg cor Angio for epicardial CAD, continues to have CPs    History of the presenting illness, Discussion/Summary and My Plan are as follows:::    Yash bhagat is a pleasant 27-year-old lady with hypertension, mild dyslipidemia, tobacco use-about 10 pack years, currently smoking a quarter pack a day, no alcohol use, prior drug use-methamphetamine and cocaine-5 in 13 years ago by injection but with negative infection workup      Family history-multiple family members with coronary artery disease-father had a myocardial infarction at 54, was a smoker, also needed CABG, mother had congestive heart failure, another brother  at 39 of a heart attack, was also smoker       She has a lot of stress at home, taking care of her brother with stage IV colon cancer along with multiple family members of his living in her house      She she had sought attention for chest pains described as left forearm pain that radiates up to the neck, mostly brought on by emotional stress, sometimes also by physical exertion, more in the cold, associated this shortness of breath and sweating, for the preceding 1 year, for which suspecting unstable angina, and undergo coronary angiography that showed about 25% RCA disease but otherwise without significant epicardial CAD  However she continues to have these pains  Echo showed an ejection fraction of 40% (new diagnosis)    She also has a chronic left bundle-branch block which on review goes back to 2015      ECG shows sinus rhythm/sinus bradycardia heart rate of 51 beats per minute with left bundle     Plan:     Chest pain:   likely microvascular disease  Tobacco cessation was advised, has a lot of stress but advised to find coping techniques, was also started on Remeron  Initiate statin-atorvastatin 20 mg for now, might need to up titrate dose of tolerated well  Continue aspirin and metoprolol   Typical treatment for microvascular disease is similar to traditional epicardial CAD  If pains do not improve, will consider Ranexa     Hypertension:  now controlled, on metoprolol and losartan-see below     Mild dyslipidemia:  Start atorvastatin 20 mg     Tobacco cessation was advised, she has a lot of stress and is trying hard , multiple -3 other family members in the house also smoke     Left bundle-branch block:   for half echo with an ejection fraction of 40%, eventually if not improved, consideration for biventricular pacing    Cardiomyopathy: Nonischemic, will switch losartan to low-dose Entresto, occasionally feeling dizzy, might not be able to up titrate dose    BMP in about 1-2 weeks  No evidence of heart failure on exam  Repeat echo in about 2 months, if not improved, can consider spironolactone or SGLP2 inhibitor    For convenience, since she does not have transportation and lives in [facility], will have her transfer her care to [address] [place] [address], with whom her brother follows as well    Follow-up in about 4-6 weeks     Patient Active Problem List   Diagnosis   • Recurrent major depressive disorder, in partial remission ([facility] [address] )   • Dysuria   • Gastroesophageal reflux disease without esophagitis   • Genital labial ulcer   • Bipolar 1 disorder ([facility] [address] )   • Schizophrenia ([facility] [address] )   • Essential hypertension   • Other fatigue   • Tobacco abuse   • Mixed hyperlipidemia   • Well adult exam   • Finger infection   • Hematoma   • C  difficile colitis   • Unstable angina [facility])     Past Medical History: Diagnosis Date   • Anxiety    • Bipolar 1 disorder ([place] [address] ) 7/24/2020   • Bipolar 1 disorder ([place] [address] ) 7/24/2020   • Bipolar disease, manic ([place] [address] )    • Depression    • Gastroesophageal reflux disease without esophagitis 7/24/2020   • Schizophrenia ([place] [address] )      Social History     Socioeconomic History   • Marital status: /Civil Union     Spouse name: Not on file   • Number of children: Not on file   • Years of education: Not on file   • Highest education level: Not on file   Occupational History   • Not on file   Tobacco Use   • Smoking status: Current Every Day Smoker     Packs/day: 0 25     Types: Cigarettes   • Smokeless tobacco: Never Used   Vaping Use   • Vaping Use: Never used   Substance and Sexual Activity   • Alcohol use: Not Currently   • Drug use: Not Currently     Types: Methamphetamines   • Sexual activity: Not on file   Other Topics Concern   • Not on file   Social History Narrative    · Do you currently or have you served in the [employer]:   No      · Were you activated, into active duty, as a member of the [employer] or as a Reservist:   No        · Exercise level:   None      · Diet:   Regular      · General stress level:   High       · Caffeine intake:    Moderate        · Seat belts used routinely:   Yes      · Smoke alarm in home:   Yes      Social Determinants of Health     Financial Resource Strain: Not on file   Food Insecurity: Not on file   Transportation Needs: Not on file   Physical Activity: Not on file   Stress: Not on file   Social Connections: Not on file   Intimate Partner Violence: Not on file   Housing Stability: Not on file      Family History   Problem Relation Age of Onset   • Heart attack Mother    • Heart disease Mother    • Heart failure Father    • COPD Father    • Heart disease Brother    • Colon cancer Brother    • Liver cancer Brother    • Heart attack Maternal Uncle      Past Surgical History:   Procedure Laterality Date   • CARDIAC CATHETERIZATION N/A 10/24/2022 Procedure: Cardiac Coronary Angiogram;  Surgeon: Cory Miller MD;  Location: [address] [place] [address] CATH LAB; Service: Cardiology   •  SECTION      x2   • HYSTERECTOMY     • KNEE SURGERY Left     4 times   • LEG SURGERY Right     thony placed then removed, broken femur   • NEUROPLASTY / TRANSPOSITION MEDIAN NERVE AT CARPAL TUNNEL         Current Outpatient Medications:   •  ALPRAZolam (XANAX) 0 5 mg tablet, Take 1 tablet (0 5 mg total) by mouth 3 (three) times a day as needed for anxiety, Disp: 90 tablet, Rfl: 1  •  aspirin (ECOTRIN LOW STRENGTH) 81 mg EC tablet, Take 1 tablet (81 mg total) by mouth daily, Disp: 90 tablet, Rfl: 3  •  esomeprazole (NexIUM) 40 MG capsule, Take 1 capsule (40 mg total) by mouth 2 (two) times a day before meals, Disp: 60 capsule, Rfl: 3  •  losartan (Cozaar) 100 MG tablet, Take 1 tablet (100 mg total) by mouth daily, Disp: 90 tablet, Rfl: 1  •  metoprolol tartrate (LOPRESSOR) 25 mg tablet, Take 1 tablet (25 mg total) by mouth every 12 (twelve) hours, Disp: 60 tablet, Rfl: 3  •  mirtazapine (REMERON) 15 mg tablet, Take 1 tablet (15 mg total) by mouth daily at bedtime, Disp: 30 tablet, Rfl: 5  •  PARoxetine (PAXIL) 40 MG tablet, Take 1 tablet (40 mg total) by mouth every morning, Disp: 90 tablet, Rfl: 1  Allergies   Allergen Reactions   • Ciprofloxacin Hives   • Wound Dressing Adhesive Other (See Comments)     unknown   • Atarax [Hydroxyzine] Palpitations       Imaging: Cardiac catheterization    Result Date: 10/24/2022  Narrative: · No significant obstructive coronary artery disease  No significant obstructive coronary artery disease  Echo complete w/ contrast if indicated    Result Date: 2022  Narrative: •  Left Ventricle: Left ventricular cavity size is normal  Wall thickness is normal  The left ventricular ejection fraction is 40%  Systolic function is mildly reduced  There is mild global hypokinesis   Diastolic function is normal  •  The left ventricular wall motion is globally hypokinetic  •  Left Atrium: The atrium is mildly dilated  •  Mitral Valve: There is mild to moderate regurgitation  Mild global hypokinesis, EF 40%  Mild to moderate mitral regurgitation  Mild LAE  No previous echo for comparison  Review of Systems:  Review of Systems   Constitutional: Negative  HENT: Negative  Eyes: Negative  Respiratory: Negative  Cardiovascular: Positive for chest pain  Negative for palpitations and leg swelling  Endocrine: Negative  Musculoskeletal: Negative  Physical Exam:  /72 (BP Location: Right arm, Patient Position: Sitting, Cuff Size: Standard)   Pulse 70   Wt 80 6 kg (177 lb 9 6 oz)   SpO2 94%   BMI 30 48 kg/m²   Physical Exam  Constitutional:       General: She is not in acute distress  Appearance: Normal appearance  She is not ill-appearing  HENT:      Nose: Nose normal  No congestion or rhinorrhea  Cardiovascular:      Rate and Rhythm: Normal rate and regular rhythm  Pulses: Normal pulses  Heart sounds: No murmur heard  No friction rub  No gallop  Pulmonary:      Effort: Pulmonary effort is normal  No respiratory distress  Breath sounds: No stridor  No wheezing or rhonchi  Musculoskeletal:         General: No swelling, tenderness, deformity or signs of injury  Normal range of motion  Cervical back: Normal range of motion  No rigidity or tenderness  Neurological:      Mental Status: She is alert  This note was completed in part utilizing [employer] direct voice recognition software  Grammatical errors, random word insertion, spelling mistakes, occasional wrong word or "sound-alike" substitutions and incomplete sentences may be an occasional consequence of the system secondary to software limitations, ambient noise and hardware issues  At the time of dictation, efforts were made to edit, clarify and /or correct errors    Please read the chart carefully and recognize, using context, where substitutions have occurred  If you have any questions or concerns about the context, text or information contained within the body of this dictation, please contact myself, the provider, for further clarification

## 2022-11-09 NOTE — LETTER
2022     Stephen Ugalde MD  [address] [facility] [address]    Patient: Sania Monson   YOB: 1975   Date of Visit: 2022       Dear Dr Som Hamm: Thank you for referring Caitie Piper to me for evaluation  Below are my notes for this consultation  If you have questions, please do not hesitate to call me  I look forward to following your patient along with you  Sincerely,        Edi Kemp MD        CC: MD Edi Muñoz MD  2022 11:39 AM  Sign when Signing Visit                                             Cardiology Follow Up    Kat Alvarez  1975  [id number]  [address] [place] [id number]  [phone]  [phone]    1  Essential hypertension     2  Unstable angina ([facility])     3  Tobacco abuse         Diagnoses and all orders for this visit:    Essential hypertension    Unstable angina (HCC)    Tobacco abuse      I had the pleasure of seeing Sania Monson for a follow up visit       INTERVAL HISTORY: Neg cor Angio for epicardial CAD, continues to have CPs    History of the presenting illness, Discussion/Summary and My Plan are as follows:::    Kat Nassar is is a pleasant 47-year-old lady with hypertension, mild dyslipidemia, tobacco use-about 10 pack years, currently smoking a quarter pack a day, no alcohol use, prior drug use-methamphetamine and cocaine-5 in 13 years ago by injection but with negative infection workup      Family history-multiple family members with coronary artery disease-father had a myocardial infarction at 54, was a smoker, also needed CABG, mother had congestive heart failure, another brother  at 39 of a heart attack, was also smoker       She has a lot of stress at home, taking care of her brother with stage IV colon cancer along with multiple family members of his living in her house      She she had sought attention for chest pains described as left forearm pain that radiates up to the neck, mostly brought on by emotional stress, sometimes also by physical exertion, more in the cold, associated this shortness of breath and sweating, for the preceding 1 year, for which suspecting unstable angina, and undergo coronary angiography that showed about 25% RCA disease but otherwise without significant epicardial CAD  However she continues to have these pains  Echo showed an ejection fraction of 40% (new diagnosis)  She also has a chronic left bundle-branch block which on review goes back to 2015      ECG shows sinus rhythm/sinus bradycardia heart rate of 51 beats per minute with left bundle     Plan:     Chest pain:   likely microvascular disease  Tobacco cessation was advised, has a lot of stress but advised to find coping techniques, was also started on Remeron  Initiate statin-atorvastatin 20 mg for now, might need to up titrate dose of tolerated well  Continue aspirin and metoprolol   Typical treatment for microvascular disease is similar to traditional epicardial CAD  If pains do not improve, will consider Ranexa     Hypertension:  now controlled, on metoprolol and losartan-see below     Mild dyslipidemia:  Start atorvastatin 20 mg     Tobacco cessation was advised, she has a lot of stress and is trying hard , multiple -3 other family members in the house also smoke     Left bundle-branch block:   for half echo with an ejection fraction of 40%, eventually if not improved, consideration for biventricular pacing    Cardiomyopathy: Nonischemic, will switch losartan to low-dose Entresto, occasionally feeling dizzy, might not be able to up titrate dose    BMP in about 1-2 weeks  No evidence of heart failure on exam  Repeat echo in about 2 months, if not improved, can consider spironolactone or SGLP2 inhibitor    For convenience, since she does not have transportation and lives in [facility], will have her transfer her care to [facility], with whom her brother follows as well    Follow-up in about 4-6 weeks     Patient Active Problem List   Diagnosis   • Recurrent major depressive disorder, in partial remission ([place] [address] )   • Dysuria   • Gastroesophageal reflux disease without esophagitis   • Genital labial ulcer   • Bipolar 1 disorder (HCC)   • Schizophrenia ([place] [address] )   • Essential hypertension   • Other fatigue   • Tobacco abuse   • Mixed hyperlipidemia   • Well adult exam   • Finger infection   • Hematoma   • C  difficile colitis   • Unstable angina ([place] [address] )     Past Medical History:   Diagnosis Date   • Anxiety    • Bipolar 1 disorder ([place] [address] ) 7/24/2020   • Bipolar 1 disorder ([place] [address] ) 7/24/2020   • Bipolar disease, manic ([place] [address] )    • Depression    • Gastroesophageal reflux disease without esophagitis 7/24/2020   • Schizophrenia ([place] [address] )      Social History     Socioeconomic History   • Marital status: /Civil Union     Spouse name: Not on file   • Number of children: Not on file   • Years of education: Not on file   • Highest education level: Not on file   Occupational History   • Not on file   Tobacco Use   • Smoking status: Current Every Day Smoker     Packs/day: 0 25     Types: Cigarettes   • Smokeless tobacco: Never Used   Vaping Use   • Vaping Use: Never used   Substance and Sexual Activity   • Alcohol use: Not Currently   • Drug use: Not Currently     Types: Methamphetamines   • Sexual activity: Not on file   Other Topics Concern   • Not on file   Social History Narrative    · Do you currently or have you served in the [facility] [employer]:   No      · Were you activated, into active duty, as a member of the [employer] or as a Reservist:   No        · Exercise level:   None      · Diet:   Regular      · General stress level:   High       · Caffeine intake:    Moderate        · Seat belts used routinely:   Yes      · Smoke alarm in home:   Yes      Social Determinants of Health     Financial Resource Strain: Not on file   Food Insecurity: Not on file   Transportation Needs: Not on file   Physical Activity: Not on file   Stress: Not on file   Social Connections: Not on file   Intimate Partner Violence: Not on file   Housing Stability: Not on file      Family History   Problem Relation Age of Onset   • Heart attack Mother    • Heart disease Mother    • Heart failure Father    • COPD Father    • Heart disease Brother    • Colon cancer Brother    • Liver cancer Brother    • Heart attack Maternal Uncle      Past Surgical History:   Procedure Laterality Date   • CARDIAC CATHETERIZATION N/A 10/24/2022    Procedure: Cardiac Coronary Angiogram;  Surgeon: Ana Ortiz MD;  Location: [address] [place] [address] CATH LAB;   Service: Cardiology   •  SECTION      x2   • HYSTERECTOMY     • KNEE SURGERY Left     4 times   • LEG SURGERY Right     thony placed then removed, broken femur   • NEUROPLASTY / TRANSPOSITION MEDIAN NERVE AT CARPAL TUNNEL         Current Outpatient Medications:   •  ALPRAZolam (XANAX) 0 5 mg tablet, Take 1 tablet (0 5 mg total) by mouth 3 (three) times a day as needed for anxiety, Disp: 90 tablet, Rfl: 1  •  aspirin (ECOTRIN LOW STRENGTH) 81 mg EC tablet, Take 1 tablet (81 mg total) by mouth daily, Disp: 90 tablet, Rfl: 3  •  esomeprazole (NexIUM) 40 MG capsule, Take 1 capsule (40 mg total) by mouth 2 (two) times a day before meals, Disp: 60 capsule, Rfl: 3  •  losartan (Cozaar) 100 MG tablet, Take 1 tablet (100 mg total) by mouth daily, Disp: 90 tablet, Rfl: 1  •  metoprolol tartrate (LOPRESSOR) 25 mg tablet, Take 1 tablet (25 mg total) by mouth every 12 (twelve) hours, Disp: 60 tablet, Rfl: 3  •  mirtazapine (REMERON) 15 mg tablet, Take 1 tablet (15 mg total) by mouth daily at bedtime, Disp: 30 tablet, Rfl: 5  •  PARoxetine (PAXIL) 40 MG tablet, Take 1 tablet (40 mg total) by mouth every morning, Disp: 90 tablet, Rfl: 1  Allergies   Allergen Reactions   • Ciprofloxacin Hives   • Wound Dressing Adhesive Other (See Comments)     unknown   • Atarax [Hydroxyzine] Palpitations       Imaging: Cardiac catheterization    Result Date: 10/24/2022  Narrative: · No significant obstructive coronary artery disease  No significant obstructive coronary artery disease  Echo complete w/ contrast if indicated    Result Date: 11/7/2022  Narrative: •  Left Ventricle: Left ventricular cavity size is normal  Wall thickness is normal  The left ventricular ejection fraction is 40%  Systolic function is mildly reduced  There is mild global hypokinesis  Diastolic function is normal  •  The left ventricular wall motion is globally hypokinetic  •  Left Atrium: The atrium is mildly dilated  •  Mitral Valve: There is mild to moderate regurgitation  Mild global hypokinesis, EF 40%  Mild to moderate mitral regurgitation  Mild LAE  No previous echo for comparison  Review of Systems:  Review of Systems   Constitutional: Negative  HENT: Negative  Eyes: Negative  Respiratory: Negative  Cardiovascular: Positive for chest pain  Negative for palpitations and leg swelling  Endocrine: Negative  Musculoskeletal: Negative  Physical Exam:  /72 (BP Location: Right arm, Patient Position: Sitting, Cuff Size: Standard)   Pulse 70   Wt 80 6 kg (177 lb 9 6 oz)   SpO2 94%   BMI 30 48 kg/m²   Physical Exam  Constitutional:       General: She is not in acute distress  Appearance: Normal appearance  She is not ill-appearing  HENT:      Nose: Nose normal  No congestion or rhinorrhea  Cardiovascular:      Rate and Rhythm: Normal rate and regular rhythm  Pulses: Normal pulses  Heart sounds: No murmur heard  No friction rub  No gallop  Pulmonary:      Effort: Pulmonary effort is normal  No respiratory distress  Breath sounds: No stridor  No wheezing or rhonchi  Musculoskeletal:         General: No swelling, tenderness, deformity or signs of injury  Normal range of motion  Cervical back: Normal range of motion  No rigidity or tenderness  Neurological:      Mental Status: She is alert  This note was completed in part utilizing [employer] direct voice recognition software  Grammatical errors, random word insertion, spelling mistakes, occasional wrong word or "sound-alike" substitutions and incomplete sentences may be an occasional consequence of the system secondary to software limitations, ambient noise and hardware issues  At the time of dictation, efforts were made to edit, clarify and /or correct errors  Please read the chart carefully and recognize, using context, where substitutions have occurred  If you have any questions or concerns about the context, text or information contained within the body of this dictation, please contact myself, the provider, for further clarification

## 2022-11-09 NOTE — PROGRESS NOTES
Name: Santosh Warren      : 1975      MRN: [id number]  Encounter Provider: Jai Tomlinson MD  Encounter Date: 2022   Encounter department: [address] [place] [address] Place     1  Screening mammogram for high-risk patient  -     Mammo screening bilateral w 3d & cad; Future; Expected date: 2022    2  Anxiety  -     ALPRAZolam (XANAX) 0 5 mg tablet; Take 1 tablet (0 5 mg total) by mouth 3 (three) times a day as needed for anxiety    3  Essential hypertension  Assessment & Plan:  Patient is stable with current anti-hypertensive medicine and continue to follow a low sodium diet and take current medication  All questions about this condition were answered today  Orders:  -     losartan (Cozaar) 100 MG tablet; Take 1 tablet (100 mg total) by mouth daily    4  Mixed hyperlipidemia  Assessment & Plan:  Patient  is stable with current medication and we discussed a low fat low cholesterol diet  Weight loss also discussed for this will help lower cholesterol also  Recheck lipids in 6 months  Orders:  -     Lipid Panel with Direct LDL reflex; Future    5  Recurrent major depressive disorder, in partial remission [facility])  Assessment & Plan:  Patient to continue utilizing medical therapy as well and counseling sources as applicable for condition  If  suicidal thought or fear of suicide to contact 911 and seek help immediately  Meds reviewed and patient questions answered today    Orders:  -     PARoxetine (PAXIL) 40 MG tablet; Take 1 tablet (40 mg total) by mouth every morning    6  Tobacco abuse  Assessment & Plan:  Patient to continue utilizing medical therapy as well and counseling sources as applicable for condition  If  suicidal thought or fear of suicide to contact 911 and seek help immediately  Meds reviewed and patient questions answered today      7  Insomnia, unspecified type  -     mirtazapine (REMERON) 15 mg tablet;  Take 1 tablet (15 mg total) by mouth daily at bedtime    8  Bipolar 1 disorder (HCC)  -     PARoxetine (PAXIL) 40 MG tablet; Take 1 tablet (40 mg total) by mouth every morning        Depression Screening and Follow-up Plan: Patient assessed for underlying major depression  Brief counseling provided and recommend additional follow-up/re-evaluation next office visit  Continue regular follow-up with their mental health provider who is managing their mental health condition(s)  Patient advised to follow-up with PCP for further management  Subjective     20-year-old female here today for checkup on multiple medical problems her patient with some bipolar disorder GERD hypertension recently had a cardiac catheterization done which shows some do very diffuse disease in her eyesight 25% blockage although she did have a mild cardiomyopathy with ejection fraction 40%  Patient see her cardiologist today she is on a beta-blocker with a see about recheck her cholesterol for she is due for that  Patient is a smoker discussed with her about the need to quit smoking because that is going to add to her cardiac problems  Patient also with some it problems sleeping at night she was taking his Xanax half a mg at bedtime which does not really work I will see about getting her some Remeron 15 mg she can take at bedtime  Patient will come back to see us in 6 weeks to assess her use of the Remeron house affective this it is for her insomnia  Review of Systems    Past Medical History:   Diagnosis Date   • Anxiety    • Bipolar 1 disorder ([facility] ) 2020   • B[facility] ([facility] ) 2020   • Bipolar disease, manic ([facility] )    • Depression    • Gastroesophageal reflux disease without esophagitis 2020   • Schizophrenia [facility])      Past Surgical History:   Procedure Laterality Date   • CARDIAC CATHETERIZATION N/A 10/24/2022    Procedure: Cardiac Coronary Angiogram;  Surgeon: Sammi Garcia MD;  Location: [address] [place] [address] CATH LAB;   Service: Cardiology   •  SECTION x2   • HYSTERECTOMY     • KNEE SURGERY Left     4 times   • LEG SURGERY Right     thony placed then removed, broken femur   • NEUROPLASTY / TRANSPOSITION MEDIAN NERVE AT CARPAL TUNNEL       Family History   Problem Relation Age of Onset   • Heart attack Mother    • Heart disease Mother    • Heart failure Father    • COPD Father    • Heart disease Brother    • Colon cancer Brother    • Liver cancer Brother    • Heart attack Maternal Uncle      Social History     Socioeconomic History   • Marital status: /Civil Union     Spouse name: None   • Number of children: None   • Years of education: None   • Highest education level: None   Occupational History   • None   Tobacco Use   • Smoking status: Current Every Day Smoker     Packs/day: 0 25     Types: Cigarettes   • Smokeless tobacco: Never Used   Vaping Use   • Vaping Use: Never used   Substance and Sexual Activity   • Alcohol use: Not Currently   • Drug use: Not Currently     Types: Methamphetamines   • Sexual activity: None   Other Topics Concern   • None   Social History Narrative    · Do you currently or have you served in [employer]:   No      · Were you activated, into active duty, as a member of the [employer] or as a Reservist:   No        · Exercise level:   None      · Diet:   Regular      · General stress level:   High       · Caffeine intake:    Moderate        · Seat belts used routinely:   Yes      · Smoke alarm in home:   Yes      Social Determinants of Health     Financial Resource Strain: Not on file   Food Insecurity: Not on file   Transportation Needs: Not on file   Physical Activity: Not on file   Stress: Not on file   Social Connections: Not on file   Intimate Partner Violence: Not on file   Housing Stability: Not on file     Current Outpatient Medications on File Prior to Visit   Medication Sig   • aspirin (ECOTRIN LOW STRENGTH) 81 mg EC tablet Take 1 tablet (81 mg total) by mouth daily   • esomeprazole (NexIUM) 40 MG capsule Take 1 capsule (40 mg total) by mouth 2 (two) times a day before meals   • metoprolol tartrate (LOPRESSOR) 25 mg tablet Take 1 tablet (25 mg total) by mouth every 12 (twelve) hours   • [DISCONTINUED] ALPRAZolam (XANAX) 0 25 mg tablet Take 2 tablets (0 5 mg total) by mouth daily at bedtime as needed for anxiety   • [DISCONTINUED] losartan (Cozaar) 100 MG tablet Take 1 tablet (100 mg total) by mouth daily   • [DISCONTINUED] PARoxetine (PAXIL) 40 MG tablet Take 1 tablet (40 mg total) by mouth every morning     Allergies   Allergen Reactions   • Ciprofloxacin Hives   • Wound Dressing Adhesive Other (See Comments)     unknown   • Atarax [Hydroxyzine] Palpitations     Immunization History   Administered Date(s) Administered   • INFLUENZA 12/02/2014   • Tuberculin Skin Test-PPD Intradermal 12/17/2012       Objective     /84 (BP Location: Left arm, Patient Position: Sitting, Cuff Size: Large)   Pulse 60   Temp 98 2 °F (36 8 °C)   Ht 5' 4" (1 626 m)   Wt 79 4 kg (175 lb)   SpO2 97%   BMI 30 04 kg/m²     Physical Exam  Ele Caldera MD

## 2022-11-09 NOTE — PATIENT INSTRUCTIONS
Start Entresto, stop losartan  Blood work-BMP in 1-2 weeks  Repeat echocardiogram in 2 months    Follow-up with Dr Montez in about 2-3 months

## 2022-11-15 ENCOUNTER — APPOINTMENT (OUTPATIENT)
Dept: LAB | Facility: HOSPITAL | Age: 47
End: 2022-11-15

## 2022-11-15 DIAGNOSIS — I42.8 NON-ISCHEMIC CARDIOMYOPATHY (HCC): ICD-10-CM

## 2022-11-15 DIAGNOSIS — E78.2 MIXED HYPERLIPIDEMIA: ICD-10-CM

## 2022-11-15 DIAGNOSIS — I10 ESSENTIAL HYPERTENSION: ICD-10-CM

## 2022-11-15 LAB
ANION GAP SERPL CALCULATED.3IONS-SCNC: 7 MMOL/L (ref 4–13)
BUN SERPL-MCNC: 19 MG/DL (ref 5–25)
CALCIUM SERPL-MCNC: 9.9 MG/DL (ref 8.3–10.1)
CHLORIDE SERPL-SCNC: 106 MMOL/L (ref 96–108)
CHOLEST SERPL-MCNC: 187 MG/DL
CO2 SERPL-SCNC: 29 MMOL/L (ref 21–32)
CREAT SERPL-MCNC: 0.73 MG/DL (ref 0.6–1.3)
GFR SERPL CREATININE-BSD FRML MDRD: 97 ML/MIN/1.73SQ M
GLUCOSE P FAST SERPL-MCNC: 116 MG/DL (ref 65–99)
HDLC SERPL-MCNC: 50 MG/DL
LDLC SERPL CALC-MCNC: 108 MG/DL (ref 0–100)
POTASSIUM SERPL-SCNC: 4.8 MMOL/L (ref 3.5–5.3)
SODIUM SERPL-SCNC: 142 MMOL/L (ref 135–147)
TRIGL SERPL-MCNC: 147 MG/DL

## 2022-11-22 ENCOUNTER — TELEPHONE (OUTPATIENT)
Dept: CARDIOLOGY CLINIC | Facility: MEDICAL CENTER | Age: 47
End: 2022-11-22

## 2022-11-22 NOTE — TELEPHONE ENCOUNTER
----- Message from Jeanne Rausch MD sent at 11/16/2022  2:55 PM EST -----  Results are normal  Please notify pt

## 2022-12-23 ENCOUNTER — HOSPITAL ENCOUNTER (OUTPATIENT)
Dept: MAMMOGRAPHY | Facility: CLINIC | Age: 47
End: 2022-12-23

## 2022-12-23 VITALS — HEIGHT: 64 IN | BODY MASS INDEX: 31.41 KG/M2 | WEIGHT: 184 LBS

## 2022-12-23 DIAGNOSIS — Z12.31 SCREENING MAMMOGRAM FOR HIGH-RISK PATIENT: ICD-10-CM

## 2023-01-09 ENCOUNTER — OFFICE VISIT (OUTPATIENT)
Dept: FAMILY MEDICINE CLINIC | Facility: CLINIC | Age: 48
End: 2023-01-09

## 2023-01-09 ENCOUNTER — TELEPHONE (OUTPATIENT)
Dept: FAMILY MEDICINE CLINIC | Facility: CLINIC | Age: 48
End: 2023-01-09

## 2023-01-09 VITALS
SYSTOLIC BLOOD PRESSURE: 154 MMHG | HEART RATE: 65 BPM | OXYGEN SATURATION: 97 % | RESPIRATION RATE: 20 BRPM | HEIGHT: 64 IN | WEIGHT: 186.4 LBS | BODY MASS INDEX: 31.82 KG/M2 | TEMPERATURE: 99 F | DIASTOLIC BLOOD PRESSURE: 92 MMHG

## 2023-01-09 DIAGNOSIS — Z23 ENCOUNTER FOR IMMUNIZATION: ICD-10-CM

## 2023-01-09 DIAGNOSIS — F31.9 BIPOLAR 1 DISORDER (HCC): ICD-10-CM

## 2023-01-09 DIAGNOSIS — Z11.59 NEED FOR HEPATITIS C SCREENING TEST: ICD-10-CM

## 2023-01-09 DIAGNOSIS — Z72.0 TOBACCO ABUSE: ICD-10-CM

## 2023-01-09 DIAGNOSIS — I10 ESSENTIAL HYPERTENSION: ICD-10-CM

## 2023-01-09 DIAGNOSIS — K21.9 GASTROESOPHAGEAL REFLUX DISEASE WITHOUT ESOPHAGITIS: Primary | ICD-10-CM

## 2023-01-09 DIAGNOSIS — Z11.4 SCREENING FOR HIV (HUMAN IMMUNODEFICIENCY VIRUS): ICD-10-CM

## 2023-01-09 DIAGNOSIS — Z12.4 SCREENING FOR CERVICAL CANCER: ICD-10-CM

## 2023-01-09 RX ORDER — LORAZEPAM 1 MG/1
1 TABLET ORAL
Qty: 30 TABLET | Refills: 1 | Status: SHIPPED | OUTPATIENT
Start: 2023-01-09

## 2023-01-09 NOTE — TELEPHONE ENCOUNTER
Pt was here for appointment and was supposed to have Remeron and Xanax discontinued and was supposed to have Ativan started  Please update and send medication to pharmacy

## 2023-01-09 NOTE — PROGRESS NOTES
Name: Shayy Fuentes      : 1975      MRN: [id number]  Encounter Provider: Oscar Butts MD  Encounter Date: 2023   Encounter department: [address] [place] [address] Place     1  Gastroesophageal reflux disease without esophagitis  Assessment & Plan:  Patient to continue with present therapy and decrease caffeine, avoid ETOH and smoking to decrease acid production  Pt should also cease eating prior to bedtime and avoid excessive fluid intake prior to sleep  May use antacids as needed for breakthrough GERD  All pateint questions answered today about this condition  2  Essential hypertension  Assessment & Plan:  Patient is stable with current anti-hypertensive medicine and continue to follow a low sodium diet and take current medication  All questions about this condition were answered today  3  Bipolar 1 disorder ([facility] [address] )  Assessment & Plan:  Patient is stable  and will continue present plan of care and reassess at next routine visit  All questions about this problem from patient were answered today  4  Tobacco abuse  Assessment & Plan:  Patient encouraged to quit using tobacco for that increases their risk for COPD, lung cancer,stroke, oral cancer and heart disease  If patient does not want to quit they should let me know  when they are interested in quitting  There are numerous options to use to quit and we can discuss them  5  Need for hepatitis C screening test    6  Encounter for immunization    7  Screening for HIV (human immunodeficiency virus)    8  Screening for cervical cancer      BMI Counseling: There is no height or weight on file to calculate BMI   The BMI is above normal  Nutrition recommendations include decreasing portion sizes, encouraging healthy choices of fruits and vegetables, decreasing fast food intake, consuming healthier snacks, limiting drinks that contain sugar, moderation in carbohydrate intake, increasing intake of lean protein, reducing intake of saturated and trans fat and reducing intake of cholesterol  Exercise recommendations include exercising 3-5 times per week  No pharmacotherapy was ordered  Patient referred to PCP  Rationale for BMI follow-up plan is due to patient being overweight or obese  Depression Screening and Follow-up Plan: Patient assessed for underlying major depression  Brief counseling provided and recommend additional follow-up/re-evaluation next office visit  Continue regular follow-up with their mental health provider who is managing their mental health condition(s)  Patient advised to follow-up with PCP for further management  Subjective     42-year-old female today for checkup on multiple medical problems  Patient with depression anxiety history of CHF and cardiomyopathy and is currently seeing cardiology and is on a beta-blocker  Patient's blood pressure is little bit high should be seeing the cardiologist this week they stopped her Cozaar and put her on metoprolol 25 twice a day her right weight is somewhat low but her blood pressure could be a little bit better controlled but she will be seeing him this Friday  Patient recently with the loss of her sister to cancer is very traumatic to her and she is also helping take care of her brother who has terminal colon cancer  Patient also has some increased weight and increased appetite with the Remeron we will see about stopping that medicine and we will see about changing her Xanax to Ativan 1 mg at bedtime and see her back in about 6 weeks  Review of Systems   Constitutional: Negative for activity change, appetite change, fatigue and fever  HENT: Negative for congestion, ear pain, postnasal drip, rhinorrhea, sinus pressure, sinus pain, sneezing and sore throat  Eyes: Negative for pain and redness  Respiratory: Negative for apnea, cough, chest tightness, shortness of breath and wheezing      Cardiovascular: Negative for chest pain, palpitations and leg swelling  Gastrointestinal: Negative for abdominal pain, constipation, diarrhea, nausea and vomiting  Endocrine: Negative for cold intolerance and heat intolerance  Genitourinary: Negative for difficulty urinating, dysuria, frequency, hematuria and urgency  Musculoskeletal: Negative for arthralgias, back pain, gait problem and myalgias  Skin: Negative for rash  Neurological: Negative for dizziness, speech difficulty, weakness, numbness and headaches  Hematological: Does not bruise/bleed easily  Psychiatric/Behavioral: Negative for agitation, confusion and hallucinations  Past Medical History:   Diagnosis Date   • Anxiety    • Bipolar 1 disorder ([place] [address] ) 2020   • Bipolar 1 disorder ([place] [address] ) 2020   • Bipolar disease, manic ([place] [address] )    • Depression    • Gastroesophageal reflux disease without esophagitis 2020   • Schizophrenia [facility])      Past Surgical History:   Procedure Laterality Date   • CARDIAC CATHETERIZATION N/A 10/24/2022    Procedure: Cardiac Coronary Angiogram;  Surgeon: Alisson Bonilla MD;  Location: [address] [place] [address] CATH LAB;   Service: Cardiology   •  SECTION      x2   • HYSTERECTOMY     • KNEE SURGERY Left     4 times   • LEG SURGERY Right     thony placed then removed, broken femur   • NEUROPLASTY / TRANSPOSITION MEDIAN NERVE AT CARPAL TUNNEL       Family History   Problem Relation Age of Onset   • Heart attack Mother    • Heart disease Mother    • Heart failure Father    • COPD Father    • Liver cancer Sister    • Colon cancer Sister 59   • Kidney cancer Sister    • Breast cancer Maternal Grandmother    • Heart disease Brother    • Colon cancer Brother 79   • Liver cancer Brother    • Heart attack Maternal Uncle      Social History     Socioeconomic History   • Marital status: /Civil Union     Spouse name: None   • Number of children: None   • Years of education: None   • Highest education level: None   Occupational History   • None   Tobacco Use • Smoking status: Every Day     Packs/day: 0 25     Types: Cigarettes   • Smokeless tobacco: Never   Vaping Use   • Vaping Use: Never used   Substance and Sexual Activity   • Alcohol use: Not Currently   • Drug use: Not Currently     Types: Methamphetamines   • Sexual activity: None   Other Topics Concern   • None   Social History Narrative    · Do you currently or have you served in the [employer]:   No      · Were you activated, into active duty, as a member of the [employer] or as a Reservist:   No        · Exercise level:   None      · Diet:   Regular      · General stress level:   High       · Caffeine intake:    Moderate        · Seat belts used routinely:   Yes      · Smoke alarm in home:   Yes      Social Determinants of Health     Financial Resource Strain: Not on file   Food Insecurity: Not on file   Transportation Needs: Not on file   Physical Activity: Not on file   Stress: Not on file   Social Connections: Not on file   Intimate Partner Violence: Not on file   Housing Stability: Not on file     Current Outpatient Medications on File Prior to Visit   Medication Sig   • ALPRAZolam (XANAX) 0 5 mg tablet Take 1 tablet (0 5 mg total) by mouth 3 (three) times a day as needed for anxiety   • aspirin (ECOTRIN LOW STRENGTH) 81 mg EC tablet Take 1 tablet (81 mg total) by mouth daily   • atorvastatin (LIPITOR) 20 mg tablet Take 1 tablet (20 mg total) by mouth daily   • esomeprazole (NexIUM) 40 MG capsule Take 1 capsule (40 mg total) by mouth 2 (two) times a day before meals   • metoprolol tartrate (LOPRESSOR) 25 mg tablet Take 1 tablet (25 mg total) by mouth every 12 (twelve) hours   • mirtazapine (REMERON) 15 mg tablet Take 1 tablet (15 mg total) by mouth daily at bedtime   • PARoxetine (PAXIL) 40 MG tablet Take 1 tablet (40 mg total) by mouth every morning   • sacubitril-valsartan (Entresto) 24-26 MG TABS Take 1 tablet by mouth 2 (two) times a day     Allergies   Allergen Reactions   • Ciprofloxacin Hives   • Wound Dressing Adhesive Other (See Comments)     unknown   • Atarax [Hydroxyzine] Palpitations     Immunization History   Administered Date(s) Administered   • INFLUENZA 12/02/2014   • Tuberculin Skin Test-PPD Intradermal 12/17/2012       Objective     /92 (BP Location: Left arm, Patient Position: Sitting, Cuff Size: Standard)   Pulse 65   Temp 99 °F (37 2 °C)   Resp 20   Ht 5' 4" (1 626 m)   Wt 84 6 kg (186 lb 6 4 oz)   SpO2 97%   BMI 32 00 kg/m²     Physical Exam  Vitals and nursing note reviewed  Constitutional:       Appearance: She is well-developed  HENT:      Head: Normocephalic and atraumatic  Nose: Nose normal    Eyes:      General: No scleral icterus  Conjunctiva/sclera: Conjunctivae normal       Pupils: Pupils are equal, round, and reactive to light  Neck:      Thyroid: No thyromegaly  Cardiovascular:      Rate and Rhythm: Normal rate and regular rhythm  Pulmonary:      Effort: Pulmonary effort is normal       Breath sounds: Normal breath sounds  No wheezing  Abdominal:      General: Bowel sounds are normal  There is no distension  Palpations: Abdomen is soft  Tenderness: There is no abdominal tenderness  There is no guarding or rebound  Musculoskeletal:         General: No tenderness or deformity  Normal range of motion  Cervical back: Normal range of motion and neck supple  Skin:     General: Skin is warm and dry  Findings: No erythema or rash  Neurological:      Mental Status: She is alert and oriented to person, place, and time  Sensory: No sensory deficit  Psychiatric:         Behavior: Behavior normal          Thought Content:  Thought content normal          Judgment: Judgment normal        Gabi Baptiste MD

## 2023-01-13 ENCOUNTER — HOSPITAL ENCOUNTER (OUTPATIENT)
Dept: NON INVASIVE DIAGNOSTICS | Facility: HOSPITAL | Age: 48
Discharge: HOME | End: 2023-01-13

## 2023-01-13 VITALS
HEIGHT: 64 IN | WEIGHT: 186 LBS | DIASTOLIC BLOOD PRESSURE: 92 MMHG | HEART RATE: 46 BPM | BODY MASS INDEX: 31.76 KG/M2 | SYSTOLIC BLOOD PRESSURE: 154 MMHG

## 2023-01-13 DIAGNOSIS — I42.8 NON-ISCHEMIC CARDIOMYOPATHY (HCC): ICD-10-CM

## 2023-01-13 LAB
AORTIC ROOT: 2.4 CM
APICAL FOUR CHAMBER EJECTION FRACTION: 39 %
AV LVOT MEAN GRADIENT: 3 MMHG
AV LVOT PEAK GRADIENT: 5 MMHG
AV PEAK GRADIENT: 10 MMHG
DOP CALC LVOT PEAK VEL VTI: 24.43 CM
DOP CALC LVOT PEAK VEL: 1.15 M/S
E WAVE DECELERATION TIME: 290 MS
FRACTIONAL SHORTENING: 25 % (ref 28–44)
INTERVENTRICULAR SEPTUM IN DIASTOLE (PARASTERNAL SHORT AXIS VIEW): 1.1 CM
INTERVENTRICULAR SEPTUM: 1.1 CM (ref 0.6–1.1)
LAAS-AP2: 13.6 CM2
LAAS-AP4: 14.8 CM2
LEFT ATRIUM AREA SYSTOLE SINGLE PLANE A4C: 14.7 CM2
LEFT ATRIUM SIZE: 3.6 CM
LEFT INTERNAL DIMENSION IN SYSTOLE: 3.8 CM (ref 2.1–4)
LEFT VENTRICULAR INTERNAL DIMENSION IN DIASTOLE: 5.1 CM (ref 3.5–6)
LEFT VENTRICULAR POSTERIOR WALL IN END DIASTOLE: 1 CM
LEFT VENTRICULAR STROKE VOLUME: 61 ML
LVSV (TEICH): 61 ML
MV E'TISSUE VEL-SEP: 5 CM/S
MV PEAK A VEL: 0.57 M/S
MV PEAK E VEL: 73 CM/S
MV STENOSIS PRESSURE HALF TIME: 84 MS
MV VALVE AREA P 1/2 METHOD: 2.62 CM2
RIGHT ATRIUM AREA SYSTOLE A4C: 9.9 CM2
RIGHT VENTRICLE ID DIMENSION: 2.9 CM
SL CV LEFT ATRIUM LENGTH A2C: 5.4 CM
SL CV PED ECHO LEFT VENTRICLE DIASTOLIC VOLUME (MOD BIPLANE) 2D: 122 ML
SL CV PED ECHO LEFT VENTRICLE SYSTOLIC VOLUME (MOD BIPLANE) 2D: 61 ML
TR MAX PG: 16 MMHG
TR PEAK VELOCITY: 2 M/S
TRICUSPID VALVE PEAK REGURGITATION VELOCITY: 2.02 M/S

## 2023-01-20 ENCOUNTER — TELEPHONE (OUTPATIENT)
Dept: CARDIOLOGY CLINIC | Facility: MEDICAL CENTER | Age: 48
End: 2023-01-20

## 2023-01-20 NOTE — TELEPHONE ENCOUNTER
----- Message from Rodo Siddiqui MD sent at 1/13/2023  3:11 PM EST -----  Overall echo looks the same as before (EF still at 40%), no changes for now - to keep upcoming appt with Dr Montez on 1/31/23

## 2023-01-21 DIAGNOSIS — K21.9 GASTROESOPHAGEAL REFLUX DISEASE, UNSPECIFIED WHETHER ESOPHAGITIS PRESENT: ICD-10-CM

## 2023-01-21 RX ORDER — ESOMEPRAZOLE MAGNESIUM 40 MG/1
CAPSULE, DELAYED RELEASE ORAL
Qty: 60 CAPSULE | Refills: 3 | Status: SHIPPED | OUTPATIENT
Start: 2023-01-21

## 2023-01-24 ENCOUNTER — APPOINTMENT (EMERGENCY)
Dept: RADIOLOGY | Facility: HOSPITAL | Age: 48
End: 2023-01-24

## 2023-01-24 ENCOUNTER — HOSPITAL ENCOUNTER (EMERGENCY)
Facility: HOSPITAL | Age: 48
Discharge: HOME | End: 2023-01-24
Attending: EMERGENCY MEDICINE

## 2023-01-24 VITALS
WEIGHT: 160 LBS | HEART RATE: 77 BPM | BODY MASS INDEX: 27.31 KG/M2 | OXYGEN SATURATION: 94 % | HEIGHT: 64 IN | RESPIRATION RATE: 20 BRPM | TEMPERATURE: 98.7 F | SYSTOLIC BLOOD PRESSURE: 160 MMHG | DIASTOLIC BLOOD PRESSURE: 85 MMHG

## 2023-01-24 DIAGNOSIS — F33.41 RECURRENT MAJOR DEPRESSIVE DISORDER, IN PARTIAL REMISSION (HCC): ICD-10-CM

## 2023-01-24 DIAGNOSIS — U07.1 COVID-19: Primary | ICD-10-CM

## 2023-01-24 LAB
FLUAV RNA RESP QL NAA+PROBE: NEGATIVE
FLUBV RNA RESP QL NAA+PROBE: NEGATIVE
RSV RNA RESP QL NAA+PROBE: NEGATIVE
SARS-COV-2 RNA RESP QL NAA+PROBE: POSITIVE

## 2023-01-24 RX ORDER — DICYCLOMINE HCL 20 MG
20 TABLET ORAL ONCE
Status: COMPLETED | OUTPATIENT
Start: 2023-01-24 | End: 2023-01-24

## 2023-01-24 RX ORDER — KETOROLAC TROMETHAMINE 30 MG/ML
15 INJECTION, SOLUTION INTRAMUSCULAR; INTRAVENOUS ONCE
Status: COMPLETED | OUTPATIENT
Start: 2023-01-24 | End: 2023-01-24

## 2023-01-24 RX ORDER — ONDANSETRON 4 MG/1
4 TABLET, ORALLY DISINTEGRATING ORAL ONCE
Status: COMPLETED | OUTPATIENT
Start: 2023-01-24 | End: 2023-01-24

## 2023-01-24 RX ORDER — NAPROXEN 500 MG/1
500 TABLET ORAL 2 TIMES DAILY WITH MEALS
Qty: 30 TABLET | Refills: 0 | Status: SHIPPED | OUTPATIENT
Start: 2023-01-24

## 2023-01-24 RX ORDER — ONDANSETRON 4 MG/1
4 TABLET, ORALLY DISINTEGRATING ORAL EVERY 6 HOURS PRN
Qty: 20 TABLET | Refills: 0 | Status: SHIPPED | OUTPATIENT
Start: 2023-01-24

## 2023-01-24 RX ADMIN — ONDANSETRON 4 MG: 4 TABLET, ORALLY DISINTEGRATING ORAL at 05:02

## 2023-01-24 RX ADMIN — KETOROLAC TROMETHAMINE 15 MG: 30 INJECTION, SOLUTION INTRAMUSCULAR at 05:03

## 2023-01-24 RX ADMIN — DEXAMETHASONE SODIUM PHOSPHATE 10 MG: 10 INJECTION, SOLUTION INTRAMUSCULAR; INTRAVENOUS at 05:02

## 2023-01-24 RX ADMIN — DICYCLOMINE HYDROCHLORIDE 20 MG: 20 TABLET ORAL at 05:02

## 2023-01-24 NOTE — ED NOTES
Dennis called for lyft ride for pt, unable to obtain ride due to pt being freshly diagnosed with jonah Moreau, KULWINDER  01/24/23 2247 Spoke with patient was informed that he needs to call his insurance and select Dr. Rosetta Jensen as the provider on his medical card. Pt., verbalized an understanding and will call his insurance on tomorrow.

## 2023-01-24 NOTE — ED PROVIDER NOTES
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  Pt reports fever, headache, generalized body aches and SOB  Patient is a 47-year-old female with psychiatric history presenting to the emergency department for evaluation of flulike symptoms that started last night  She reports fevers, chills, generalized malaise, headache, generalized myalgias, cough, congestion, nausea without vomiting, diarrhea, shortness of breath  Brother is currently hospitalized with COVID  She is not having any chest pain, abdominal pain, visual disturbance, dizziness, focal weakness, numbness, tingling  No other complaints at this time          Prior to Admission Medications   Prescriptions Last Dose Informant Patient Reported? Taking?    LORazepam (ATIVAN) 1 mg tablet   No No   Sig: Take 1 tablet (1 mg total) by mouth daily at bedtime   PARoxetine (PAXIL) 40 MG tablet   No No   Sig: Take 1 tablet (40 mg total) by mouth every morning   aspirin (ECOTRIN LOW STRENGTH) 81 mg EC tablet   No No   Sig: Take 1 tablet (81 mg total) by mouth daily   atorvastatin (LIPITOR) 20 mg tablet   No No   Sig: Take 1 tablet (20 mg total) by mouth daily   esomeprazole (NexIUM) 40 MG capsule   No No   Sig: take 1 capsule by mouth twice a day before meals   metoprolol tartrate (LOPRESSOR) 25 mg tablet   No No   Sig: Take 1 tablet (25 mg total) by mouth every 12 (twelve) hours   sacubitril-valsartan (Entresto) 24-26 MG TABS   No No   Sig: Take 1 tablet by mouth 2 (two) times a day      Facility-Administered Medications: None       Past Medical History:   Diagnosis Date   • Anxiety    • Bipolar 1 disorder ([facility] ) 7/24/2020   • Bipolar 1 disorder ([facility] ) 7/24/2020   • Bipolar disease, manic ([facility] )    • Depression    • Gastroesophageal reflux disease without esophagitis 7/24/2020 • Schizophrenia [facility])        Past Surgical History:   Procedure Laterality Date   • CARDIAC CATHETERIZATION N/A 10/24/2022    Procedure: Cardiac Coronary Angiogram;  Surgeon: Angelique White MD;  Location: [address] [place] [address] CATH LAB; Service: Cardiology   •  SECTION      x2   • HYSTERECTOMY     • KNEE SURGERY Left     4 times   • LEG SURGERY Right     thony placed then removed, broken femur   • NEUROPLASTY / TRANSPOSITION MEDIAN NERVE AT CARPAL TUNNEL         Family History   Problem Relation Age of Onset   • Heart attack Mother    • Heart disease Mother    • Heart failure Father    • COPD Father    • Liver cancer Sister    • Colon cancer Sister 59   • Kidney cancer Sister    • Breast cancer Maternal Grandmother    • Heart disease Brother    • Colon cancer Brother 79   • Liver cancer Brother    • Heart attack Maternal Uncle      I have reviewed and agree with the history as documented  E-Cigarette/Vaping   • E-Cigarette Use Never User      E-Cigarette/Vaping Substances   • Nicotine No    • THC No    • CBD No    • Flavoring No    • Other No    • Unknown No      Social History     Tobacco Use   • Smoking status: Every Day     Packs/day: 0 25     Types: Cigarettes   • Smokeless tobacco: Never   Vaping Use   • Vaping Use: Never used   Substance Use Topics   • Alcohol use: Not Currently   • Drug use: Not Currently     Types: Methamphetamines       Review of Systems   Constitutional: Positive for chills, fatigue and fever  HENT: Positive for congestion  Negative for facial swelling, nosebleeds, sore throat and voice change  Eyes: Negative for pain and redness  Respiratory: Positive for cough and shortness of breath  Negative for choking, chest tightness and stridor  Cardiovascular: Negative for chest pain and palpitations  Gastrointestinal: Positive for diarrhea and nausea  Negative for abdominal pain and vomiting  Musculoskeletal: Positive for myalgias   Negative for arthralgias, back pain, neck pain and neck stiffness  Skin: Negative for color change and rash  Neurological: Positive for headaches  Negative for dizziness, syncope, facial asymmetry, weakness, light-headedness and numbness  Psychiatric/Behavioral: Negative for confusion and suicidal ideas  All other systems reviewed and are negative  Physical Exam  Physical Exam  Vitals reviewed  Constitutional:       General: She is not in acute distress  Appearance: Normal appearance  She is not ill-appearing, toxic-appearing or diaphoretic  HENT:      Head: Normocephalic and atraumatic  Right Ear: External ear normal       Left Ear: External ear normal       Nose: Nose normal  No congestion or rhinorrhea  Mouth/Throat:      Mouth: Mucous membranes are moist       Pharynx: Oropharynx is clear  No oropharyngeal exudate or posterior oropharyngeal erythema  Eyes:      General: No scleral icterus  Right eye: No discharge  Left eye: No discharge  Extraocular Movements: Extraocular movements intact  Conjunctiva/sclera: Conjunctivae normal    Cardiovascular:      Rate and Rhythm: Normal rate and regular rhythm  Pulses: Normal pulses  Heart sounds: Normal heart sounds  No murmur heard  No friction rub  No gallop  Pulmonary:      Effort: Pulmonary effort is normal  No respiratory distress  Breath sounds: Normal breath sounds  No stridor  No wheezing, rhonchi or rales  Abdominal:      General: Abdomen is flat  Palpations: Abdomen is soft  Tenderness: There is no abdominal tenderness  There is no guarding or rebound  Musculoskeletal:      Cervical back: Normal range of motion and neck supple  Right lower leg: No edema  Left lower leg: No edema  Skin:     General: Skin is warm and dry  Capillary Refill: Capillary refill takes less than 2 seconds  Neurological:      General: No focal deficit present        Mental Status: She is alert and oriented to person, place, and time  Psychiatric:         Mood and Affect: Mood normal          Behavior: Behavior normal          Vital Signs  ED Triage Vitals [01/24/23 0350]   Temperature Pulse Respirations Blood Pressure SpO2   98 7 °F (37 1 °C) 77 20 160/85 94 %      Temp Source Heart Rate Source Patient Position - Orthostatic VS BP Location FiO2 (%)   Oral Monitor Sitting Right arm --      Pain Score       --           Vitals:    01/24/23 0350   BP: 160/85   Pulse: 77   Patient Position - Orthostatic VS: Sitting         Visual Acuity      ED Medications  Medications   dexamethasone oral liquid 10 mg 1 mL (has no administration in time range)   ketorolac (TORADOL) injection 15 mg (has no administration in time range)   ondansetron (ZOFRAN-ODT) dispersible tablet 4 mg (has no administration in time range)   dicyclomine (BENTYL) tablet 20 mg (has no administration in time range)       Diagnostic Studies  Results Reviewed     Procedure Component Value Units Date/Time    FLU/RSV/COVID - if FLU/RSV clinically relevant [[id number]]  (Abnormal) Collected: 01/24/23 0359    Lab Status: Final result Specimen: Nares from Nose Updated: 01/24/23 0442     SARS-CoV-2 Positive     INFLUENZA A PCR Negative     INFLUENZA B PCR Negative     RSV PCR Negative    Narrative:      FOR PEDIATRIC PATIENTS - copy/paste COVID Guidelines URL to browser: [email or web address]  [email or web address]    SARS-CoV-2 assay is a Nucleic Acid Amplification assay intended for the  qualitative detection of nucleic acid from SARS-CoV-2 in nasopharyngeal  swabs  Results are for the presumptive identification of SARS-CoV-2 RNA  Positive results are indicative of infection with SARS-CoV-2, the virus  causing COVID-19, but do not rule out bacterial infection or co-infection  with other viruses  Laboratories within the [place] and its  territories are required to report all positive results to the appropriate  public health authorities  Negative results do not preclude SARS-CoV-2  infection and should not be used as the sole basis for treatment or other  patient management decisions  Negative results must be combined with  clinical observations, patient history, and epidemiological information  This test has not been FDA cleared or approved  This test has been authorized by FDA under an Emergency Use Authorization  (EUA)  This test is only authorized for the duration of time the  declaration that circumstances exist justifying the authorization of the  emergency use of an in vitro diagnostic tests for detection of SARS-CoV-2  virus and/or diagnosis of COVID-19 infection under section 564(b)(1) of  the Act, 21 U  S C  050EDO-8(J)(5), unless the authorization is terminated  or revoked sooner  The test has been validated but independent review by FDA  and CLIA is pending  Test performed using [employer]: This RT-PCR assay targets N2,  a region unique to SARS-CoV-2  A conserved region in the E-gene was chosen  for pan-Sarbecovirus detection which includes SARS-CoV-2  According to CMS-2020-01-R, this platform meets the definition of high-throughput technology  XR chest 1 view portable    (Results Pending)              Procedures  Procedures         ED Course                                             Medical Decision Making  Patient presenting for flulike symptoms  Upon arrival she appears comfortable  She is not in any acute distress  Vital signs unremarkable  She did test positive for COVID-19  Chest x-ray without acute cardiopulmonary disease  Physical examination benign  She was given Toradol, Decadron, Zofran, Bentyl for symptomatic management  She was given a prescription for naproxen and Zofran to take at home  Strict return precautions were discussed  She was discharged home in stable condition with strict return precautions  Symptomatic management was discussed    PCP follow-up encouraged    COVID-19: complicated acute illness or injury  Recurrent major depressive disorder, in partial remission ([facility] [address] ): self-limited or minor problem  Amount and/or Complexity of Data Reviewed  Radiology: ordered and independent interpretation performed  Details: No acute cardiopulmonary disease      Risk  Prescription drug management  Disposition  Final diagnoses:   COVID-19   Recurrent major depressive disorder, in partial remission ([facility] [address] )     Time reflects when diagnosis was documented in both MDM as applicable and the Disposition within this note     Time User Action Codes Description Comment    1/24/2023  4:56 AM Jamel Romberg Add [U07 1] COVID-19     1/24/2023  4:56 AM Jamel Romberg Add [F33 41] Recurrent major depressive disorder, in partial remission [facility])       ED Disposition     ED Disposition   Discharge    Condition   Stable    Date/Time   Tue Jan 24, 2023  4:56 AM    Comment   Slick Alvarez discharge to home/self care                 Follow-up Information     Follow up With Specialties Details Why Contact Info Additional [address] [facility] Emergency Department Emergency Medicine Go to  If symptoms worsen [address] Emergency Department, [address] [place] [address],[address] [place], [address]    Agnieszka Rondon MD Family Medicine Schedule an appointment as soon as possible for a visit  for follow up [address] [place] [address]  [phone]             Patient's Medications   Discharge Prescriptions    NAPROXEN (NAPROSYN) 500 MG TABLET    Take 1 tablet (500 mg total) by mouth 2 (two) times a day with meals       Start Date: 1/24/2023 End Date: --       Order Dose: 500 mg       Quantity: 30 tablet    Refills: 0    ONDANSETRON (ZOFRAN-ODT) 4 MG DISINTEGRATING TABLET    Take 1 tablet (4 mg total) by mouth every 6 (six) hours as needed for nausea or vomiting       Start Date: 1/24/2023 End Date: -- Order Dose: 4 mg       Quantity: 20 tablet    Refills: 0       No discharge procedures on file      PDMP Review       Value Time User    PDMP Reviewed  Yes 10/12/2022  1:49 AM Tyrel Zhou MD          ED Provider  Electronically Signed by           Nancy Maldonado PA-C  01/24/23 5292

## 2023-01-25 DIAGNOSIS — I20.0 UNSTABLE ANGINA (HCC): ICD-10-CM

## 2023-01-25 NOTE — TELEPHONE ENCOUNTER
Patient scheduled to see Dr Aleah Vance 1/31/23  Patient tested positive for COVID 1/24/23, guidelines indicate 10 days from onset  Would you like to do a virtual visit or reschedule this patient  Please advise

## 2023-01-25 NOTE — TELEPHONE ENCOUNTER
Lm on patient's vm that office visit is being changed to virtual and to call to confirm that she has received this message  Also cancelled LYFT for that day

## 2023-01-31 ENCOUNTER — TELEMEDICINE (OUTPATIENT)
Dept: CARDIOLOGY CLINIC | Facility: CLINIC | Age: 48
End: 2023-01-31

## 2023-01-31 VITALS — BODY MASS INDEX: 27.31 KG/M2 | WEIGHT: 160 LBS | HEIGHT: 64 IN

## 2023-01-31 DIAGNOSIS — I10 ESSENTIAL HYPERTENSION: Primary | ICD-10-CM

## 2023-01-31 DIAGNOSIS — I42.8 NON-ISCHEMIC CARDIOMYOPATHY (HCC): ICD-10-CM

## 2023-01-31 DIAGNOSIS — I20.0 UNSTABLE ANGINA (HCC): ICD-10-CM

## 2023-01-31 RX ORDER — METOPROLOL TARTRATE 50 MG/1
50 TABLET, FILM COATED ORAL EVERY 12 HOURS
Qty: 60 TABLET | Refills: 3 | Status: SHIPPED | OUTPATIENT
Start: 2023-01-31

## 2023-01-31 NOTE — PROGRESS NOTES
Virtual Regular Visit    Verification of patient location:    Patient is located in the following state in which I hold an active license [place]      Assessment/Plan:    Problem List Items Addressed This Visit        Cardiovascular and Mediastinum    Essential hypertension - Primary    Relevant Medications    metoprolol tartrate (LOPRESSOR) 50 mg tablet    Unstable angina (HCC)    Relevant Medications    metoprolol tartrate (LOPRESSOR) 50 mg tablet    Non-ischemic cardiomyopathy (HCC)    Relevant Medications    metoprolol tartrate (LOPRESSOR) 50 mg tablet              1   Microvascular angina  2  Nonischemic myocardial injury  3  Left bundle branch block    Patient is doing well  Denies all complaints  Continue with current medications  She denies shortness of breath, leg edema  Denies syncope or presyncope  Continue with current medications  Patient inform us at the onset of any cardiac symptoms  Recommend patient presents to the emergency room or call our office if he has any new/persistent or progressive symptoms  Reason for visit is   Chief Complaint   Patient presents with   • follow up        Encounter provider Surehs Miranda MD    Provider located at [address] [place] [id number]      Recent Visits  No visits were found meeting these conditions  Showing recent visits within past 7 days and meeting all other requirements  Today's Visits  Date Type Provider Dept   01/31/23 Telemedicine Suresh Miranda MD [facility] today's visits and meeting all other requirements  Future Appointments  No visits were found meeting these conditions  Showing future appointments within next 150 days and meeting all other requirements       The patient was identified by name and date of birth   Edwina Reyna was informed that this is a telemedicine visit and that the visit is being conducted through the [employer]  She agrees to proceed     My office door was closed  No one else was in the room  She acknowledged consent and understanding of privacy and security of the video platform  The patient has agreed to participate and understands they can discontinue the visit at any time  Patient is aware this is a billable service  Subjective  Tiffany Meza is a 50 y o  female with past medical history of microvascular angina and nonischemic cardiomyopathy who presents for follow-up  She is doing well  Denies all complaints  Denies chest pain, chest pressure, shortness of breath, dizziness, lightheadedness, presyncope or syncope  Denies orthopnea, PND or lower limb edema  Unfortunate, her sister passed away recently with colon cancer  She is taking care of her brother who also has colon cancer  Patient will try and get in touch with hematology regarding cancer screening  She was recently diagnosed with COVID  Fortunately she has remained asymptomatic  HPI     Past Medical History:   Diagnosis Date   • Anxiety    • Bipolar 1 disorder ([facility] ) 2020   • Bipolar 1 disorder ([facility] ) 2020   • Bipolar disease, manic ([facility] )    • Depression    • Gastroesophageal reflux disease without esophagitis 2020   • Schizophrenia [facility])        Past Surgical History:   Procedure Laterality Date   • CARDIAC CATHETERIZATION N/A 10/24/2022    Procedure: Cardiac Coronary Angiogram;  Surgeon: Martha Gallardo MD;  Location: [address] [place] [address] CATH LAB;   Service: Cardiology   •  SECTION      x2   • HYSTERECTOMY     • KNEE SURGERY Left     4 times   • LEG SURGERY Right     thony placed then removed, broken femur   • NEUROPLASTY / TRANSPOSITION MEDIAN NERVE AT CARPAL TUNNEL         Current Outpatient Medications   Medication Sig Dispense Refill   • aspirin (ECOTRIN LOW STRENGTH) 81 mg EC tablet Take 1 tablet (81 mg total) by mouth daily 90 tablet 3   • atorvastatin (LIPITOR) 20 mg tablet Take 1 tablet (20 mg total) by mouth daily 60 tablet 6   • esomeprazole (NexIUM) 40 MG capsule take 1 capsule by mouth twice a day before meals 60 capsule 3   • LORazepam (ATIVAN) 1 mg tablet Take 1 tablet (1 mg total) by mouth daily at bedtime 30 tablet 1   • metoprolol tartrate (LOPRESSOR) 50 mg tablet Take 1 tablet (50 mg total) by mouth every 12 (twelve) hours 60 tablet 3   • PARoxetine (PAXIL) 40 MG tablet Take 1 tablet (40 mg total) by mouth every morning 90 tablet 1   • sacubitril-valsartan (Entresto) 24-26 MG TABS Take 1 tablet by mouth 2 (two) times a day 120 tablet 4   • naproxen (Naprosyn) 500 mg tablet Take 1 tablet (500 mg total) by mouth 2 (two) times a day with meals (Patient not taking: Reported on 1/31/2023) 30 tablet 0   • ondansetron (ZOFRAN-ODT) 4 mg disintegrating tablet Take 1 tablet (4 mg total) by mouth every 6 (six) hours as needed for nausea or vomiting (Patient not taking: Reported on 1/31/2023) 20 tablet 0     No current facility-administered medications for this visit  Allergies   Allergen Reactions   • Ciprofloxacin Hives   • Wound Dressing Adhesive Other (See Comments)     unknown   • Atarax [Hydroxyzine] Palpitations       Review of Systems   Constitutional: Negative  Negative for chills, fatigue and fever  HENT: Negative  Eyes: Negative  Respiratory: Negative  Negative for chest tightness, shortness of breath and wheezing  Cardiovascular: Negative for chest pain, palpitations and leg swelling  Gastrointestinal: Negative for nausea and vomiting  Endocrine: Negative  Musculoskeletal: Negative  Neurological: Negative for dizziness, syncope, weakness and light-headedness  Hematological: Negative for adenopathy  Psychiatric/Behavioral: Negative for agitation  All other systems reviewed and are negative        Video Exam    Vitals:    01/31/23 0940   Weight: 72 6 kg (160 lb)   Height: 5' 4" (1 626 m)       Physical Exam     I spent 15 minutes directly with the patient during this visit

## 2023-02-08 ENCOUNTER — VBI (OUTPATIENT)
Dept: ADMINISTRATIVE | Facility: OTHER | Age: 48
End: 2023-02-08

## 2023-02-19 NOTE — PROGRESS NOTES
Name: Chika Adams      : 1975      MRN: [id number]  Encounter Provider: Katerine Ordoñez MD  Encounter Date: 2023   Encounter department: [address] [place] [address] Place     1  Gastroesophageal reflux disease without esophagitis  Assessment & Plan:  Patient to continue with present therapy and decrease caffeine, avoid ETOH and smoking to decrease acid production  Pt should also cease eating prior to bedtime and avoid excessive fluid intake prior to sleep  May use antacids as needed for breakthrough GERD  All pateint questions answered today about this condition  2  Essential hypertension  Assessment & Plan:  Patient is stable with current anti-hypertensive medicine and continue to follow a low sodium diet and take current medication  All questions about this condition were answered today  3  Dyslipidemia, goal LDL below 70  Assessment & Plan:  Patient  is stable with current medication and we discussed a low fat low cholesterol diet  Weight loss also discussed for this will help lower cholesterol also  Recheck lipids in 6 months  4  Bipolar 1 disorder (HCC)  -     LORazepam (ATIVAN) 1 mg tablet; Take 1 tablet (1 mg total) by mouth every morning  -     PARoxetine (PAXIL) 40 MG tablet; Take 1 tablet (40 mg total) by mouth every morning    5  Recurrent major depressive disorder, in partial remission [facility])  Assessment & Plan:  Patient to continue utilizing medical therapy as well and counseling sources as applicable for condition  If  suicidal thought or fear of suicide to contact 911 and seek help immediately  Meds reviewed and patient questions answered today    Orders:  -     PARoxetine (PAXIL) 40 MG tablet; Take 1 tablet (40 mg total) by mouth every morning    6  Insomnia, unspecified type  -     zolpidem (AMBIEN CR) 12 5 MG CR tablet;  Take 1 tablet (12 5 mg total) by mouth daily at bedtime as needed for sleep        Depression Screening and Follow-up Plan: Patient assessed for underlying major depression  Brief counseling provided and recommend additional follow-up/re-evaluation next office visit  Continue regular follow-up with their mental health provider who is managing their mental health condition(s)  Patient advised to follow-up with PCP for further management  Subjective     HPI  Review of Systems   Constitutional: Negative for activity change, appetite change, fatigue and fever  HENT: Negative for congestion, ear pain, postnasal drip, rhinorrhea, sinus pressure, sinus pain, sneezing and sore throat  Eyes: Negative for pain and redness  Respiratory: Negative for apnea, cough, chest tightness, shortness of breath and wheezing  Cardiovascular: Negative for chest pain, palpitations and leg swelling  Gastrointestinal: Negative for abdominal pain, constipation, diarrhea, nausea and vomiting  Endocrine: Negative for cold intolerance and heat intolerance  Genitourinary: Negative for difficulty urinating, dysuria, frequency, hematuria and urgency  Musculoskeletal: Negative for arthralgias, back pain, gait problem and myalgias  Skin: Negative for rash  Neurological: Negative for dizziness, speech difficulty, weakness, numbness and headaches  Hematological: Does not bruise/bleed easily  Psychiatric/Behavioral: Negative for agitation, confusion and hallucinations  Past Medical History:   Diagnosis Date   • Anxiety    • Bipolar 1 disorder ([facility] ) 2020   • Bipolar 1 disorder ([facility] ) 2020   • Bipolar disease, manic ([facility] )    • Depression    • Gastroesophageal reflux disease without esophagitis 2020   • Schizophrenia [facility])      Past Surgical History:   Procedure Laterality Date   • CARDIAC CATHETERIZATION N/A 10/24/2022    Procedure: Cardiac Coronary Angiogram;  Surgeon: Yazan Fish MD;  Location: [address] [place] [address] CATH LAB;   Service: Cardiology   •  SECTION      x2   • HYSTERECTOMY     • KNEE SURGERY Left     4 times   • LEG SURGERY Right     thony placed then removed, broken femur   • NEUROPLASTY / TRANSPOSITION MEDIAN NERVE AT CARPAL TUNNEL       Family History   Problem Relation Age of Onset   • Heart attack Mother    • Heart disease Mother    • Heart failure Father    • COPD Father    • Liver cancer Sister    • Colon cancer Sister 59   • Kidney cancer Sister    • Breast cancer Maternal Grandmother    • Heart disease Brother    • Colon cancer Brother 79   • Liver cancer Brother    • Heart attack Maternal Uncle      Social History     Socioeconomic History   • Marital status: /Civil Union     Spouse name: None   • Number of children: None   • Years of education: None   • Highest education level: None   Occupational History   • None   Tobacco Use   • Smoking status: Every Day     Packs/day: 0 25     Types: Cigarettes   • Smokeless tobacco: Never   Vaping Use   • Vaping Use: Never used   Substance and Sexual Activity   • Alcohol use: Not Currently   • Drug use: Not Currently     Types: Methamphetamines   • Sexual activity: None   Other Topics Concern   • None   Social History Narrative    · Do you currently or have you served in the [employer]:   No      · Were you activated, into active duty, as a member of the [employer] or as a Reservist:   No        · Exercise level:   None      · Diet:   Regular      · General stress level:   High       · Caffeine intake:    Moderate        · Seat belts used routinely:   Yes      · Smoke alarm in home:   Yes      Social Determinants of Health     Financial Resource Strain: Not on file   Food Insecurity: Not on file   Transportation Needs: Not on file   Physical Activity: Not on file   Stress: Not on file   Social Connections: Not on file   Intimate Partner Violence: Not on file   Housing Stability: Not on file     Current Outpatient Medications on File Prior to Visit   Medication Sig   • aspirin (ECOTRIN LOW STRENGTH) 81 mg EC tablet Take 1 tablet (81 mg total) by mouth daily   • atorvastatin (LIPITOR) 20 mg tablet Take 1 tablet (20 mg total) by mouth daily   • esomeprazole (NexIUM) 40 MG capsule take 1 capsule by mouth twice a day before meals   • metoprolol tartrate (LOPRESSOR) 50 mg tablet Take 1 tablet (50 mg total) by mouth every 12 (twelve) hours   • sacubitril-valsartan (Entresto) 24-26 MG TABS Take 1 tablet by mouth 2 (two) times a day   • [DISCONTINUED] LORazepam (ATIVAN) 1 mg tablet Take 1 tablet (1 mg total) by mouth daily at bedtime   • [DISCONTINUED] naproxen (Naprosyn) 500 mg tablet Take 1 tablet (500 mg total) by mouth 2 (two) times a day with meals (Patient not taking: Reported on 1/31/2023)   • [DISCONTINUED] ondansetron (ZOFRAN-ODT) 4 mg disintegrating tablet Take 1 tablet (4 mg total) by mouth every 6 (six) hours as needed for nausea or vomiting (Patient not taking: Reported on 1/31/2023)   • [DISCONTINUED] PARoxetine (PAXIL) 40 MG tablet Take 1 tablet (40 mg total) by mouth every morning     Allergies   Allergen Reactions   • Ciprofloxacin Hives   • Wound Dressing Adhesive Other (See Comments)     unknown   • Atarax [Hydroxyzine] Palpitations     Immunization History   Administered Date(s) Administered   • INFLUENZA 12/02/2014   • Tuberculin Skin Test-PPD Intradermal 12/17/2012       Objective     /84 (BP Location: Left arm, Patient Position: Sitting, Cuff Size: Large)   Pulse (!) 54   Temp 97 9 °F (36 6 °C)   Ht 5' 4" (1 626 m)   Wt 83 5 kg (184 lb)   SpO2 96%   BMI 31 58 kg/m²     Physical Exam  Vitals and nursing note reviewed  Constitutional:       Appearance: She is well-developed  HENT:      Head: Normocephalic and atraumatic  Nose: Nose normal       Mouth/Throat:      Mouth: Mucous membranes are moist    Eyes:      General: No scleral icterus  Conjunctiva/sclera: Conjunctivae normal       Pupils: Pupils are equal, round, and reactive to light  Neck:      Thyroid: No thyromegaly     Cardiovascular:      Rate and Rhythm: Normal rate and regular rhythm  Pulmonary:      Effort: Pulmonary effort is normal       Breath sounds: Normal breath sounds  No wheezing  Abdominal:      General: Bowel sounds are normal  There is no distension  Palpations: Abdomen is soft  Tenderness: There is no abdominal tenderness  There is no guarding or rebound  Musculoskeletal:         General: No tenderness or deformity  Normal range of motion  Cervical back: Normal range of motion and neck supple  Skin:     General: Skin is warm and dry  Findings: No erythema or rash  Neurological:      Mental Status: She is alert and oriented to person, place, and time  Sensory: No sensory deficit  Psychiatric:         Behavior: Behavior normal          Thought Content:  Thought content normal          Judgment: Judgment normal        Diana Devi MD

## 2023-02-20 ENCOUNTER — OFFICE VISIT (OUTPATIENT)
Dept: FAMILY MEDICINE CLINIC | Facility: CLINIC | Age: 48
End: 2023-02-20

## 2023-02-20 ENCOUNTER — TELEPHONE (OUTPATIENT)
Dept: FAMILY MEDICINE CLINIC | Facility: CLINIC | Age: 48
End: 2023-02-20

## 2023-02-20 VITALS
OXYGEN SATURATION: 96 % | TEMPERATURE: 97.9 F | WEIGHT: 184 LBS | DIASTOLIC BLOOD PRESSURE: 84 MMHG | HEIGHT: 64 IN | BODY MASS INDEX: 31.41 KG/M2 | HEART RATE: 54 BPM | SYSTOLIC BLOOD PRESSURE: 168 MMHG

## 2023-02-20 DIAGNOSIS — I10 ESSENTIAL HYPERTENSION: ICD-10-CM

## 2023-02-20 DIAGNOSIS — K21.9 GASTROESOPHAGEAL REFLUX DISEASE WITHOUT ESOPHAGITIS: Primary | ICD-10-CM

## 2023-02-20 DIAGNOSIS — F33.41 RECURRENT MAJOR DEPRESSIVE DISORDER, IN PARTIAL REMISSION (HCC): ICD-10-CM

## 2023-02-20 DIAGNOSIS — F31.9 BIPOLAR 1 DISORDER (HCC): ICD-10-CM

## 2023-02-20 DIAGNOSIS — E78.5 DYSLIPIDEMIA, GOAL LDL BELOW 70: ICD-10-CM

## 2023-02-20 DIAGNOSIS — G47.00 INSOMNIA, UNSPECIFIED TYPE: ICD-10-CM

## 2023-02-20 RX ORDER — LORAZEPAM 1 MG/1
1 TABLET ORAL EVERY MORNING
Qty: 30 TABLET | Refills: 2 | Status: SHIPPED | OUTPATIENT
Start: 2023-02-20

## 2023-02-20 RX ORDER — ZOLPIDEM TARTRATE 10 MG/1
10 TABLET ORAL
Qty: 30 TABLET | Refills: 5 | Status: SHIPPED | OUTPATIENT
Start: 2023-02-20

## 2023-02-20 RX ORDER — ZOLPIDEM TARTRATE 12.5 MG/1
12.5 TABLET, FILM COATED, EXTENDED RELEASE ORAL
Qty: 30 TABLET | Refills: 3 | Status: SHIPPED | OUTPATIENT
Start: 2023-02-20 | End: 2023-02-20

## 2023-02-20 RX ORDER — PAROXETINE HYDROCHLORIDE 40 MG/1
40 TABLET, FILM COATED ORAL EVERY MORNING
Qty: 90 TABLET | Refills: 1 | Status: SHIPPED | OUTPATIENT
Start: 2023-02-20

## 2023-02-20 NOTE — TELEPHONE ENCOUNTER
Ambien CR is not covered by insurance  Ambien will be covered  Please review and advise  Send to [facility]

## 2023-03-31 ENCOUNTER — OFFICE VISIT (OUTPATIENT)
Dept: CARDIOLOGY CLINIC | Facility: CLINIC | Age: 48
End: 2023-03-31

## 2023-03-31 VITALS
HEART RATE: 40 BPM | DIASTOLIC BLOOD PRESSURE: 62 MMHG | HEIGHT: 64 IN | OXYGEN SATURATION: 98 % | RESPIRATION RATE: 16 BRPM | SYSTOLIC BLOOD PRESSURE: 128 MMHG | WEIGHT: 174 LBS | BODY MASS INDEX: 29.71 KG/M2

## 2023-03-31 DIAGNOSIS — I10 ESSENTIAL HYPERTENSION: ICD-10-CM

## 2023-03-31 DIAGNOSIS — I20.0 UNSTABLE ANGINA (HCC): ICD-10-CM

## 2023-03-31 DIAGNOSIS — I42.8 NON-ISCHEMIC CARDIOMYOPATHY (HCC): ICD-10-CM

## 2023-03-31 DIAGNOSIS — I20.8 MICROVASCULAR ANGINA (HCC): ICD-10-CM

## 2023-03-31 DIAGNOSIS — Z72.0 TOBACCO ABUSE: ICD-10-CM

## 2023-03-31 DIAGNOSIS — R00.1 BRADYCARDIA: Primary | ICD-10-CM

## 2023-03-31 RX ORDER — RANOLAZINE 500 MG/1
500 TABLET, EXTENDED RELEASE ORAL 2 TIMES DAILY
Qty: 60 TABLET | Refills: 2 | Status: SHIPPED | OUTPATIENT
Start: 2023-03-31

## 2023-03-31 NOTE — PROGRESS NOTES
Cardiology Follow Up    Raleigh Alvarez  1975  [id number]  [facility] [place] [address] [phone]    Discussion/Summary:  1  Microvascular angina  2  Non-ischemic myocardial injury with last known LV function of 40%  NYHA class I  3  LBBB  4  Bradycardia   5  Tobacco abuse  6  Nonobstructive CAD    Continue with aspirin and statin  Will decrease metoprolol given bradycardia  We will check a cardiac event monitor for further evaluation  We will also add Ranexa for microvascular angina as decreasing metoprolol  Strongly recommended follow-up with GI/PCP given family history of malignancy  She states she is working on getting her colonoscopy  Strongly encourage tobacco cessation  Patient will return to follow-up in 4 to 6 weeks  Previous studies were reviewed  Safety measures were reviewed  Questions were entertained and answered  Patient was advised to report any problems requiring medical attention  Follow-up with PCP and appropriate specialist and lab work as discussed  Return for follow up visit as scheduled or earlier, if needed  Thank you for allowing me to participate in the care and evaluation of your patient  Should you have any questions, please feel free to contact me  History of Present Illness:     Carlos Benz is a 50 y o  female who presents for follow up for cardiovascular care  Denies chest pain, chest pressure, shortness of breath, dizziness, lightheadedness, presyncope or syncope  Denies orthopnea, PND or lower limb edema  She has occasional sharp left arm shooting pain  Symptoms have been going on prior to cardiac cath  States have been better  Patient is concerned about low heart rate  She denies presyncope or syncope  Denies lightheadedness or dizziness      Unfortunately she continues to smoke  Patient Active Problem List   Diagnosis   • Recurrent major depressive disorder, in partial remission ([facility])   • Dysuria   • Gastroesophageal reflux disease without esophagitis   • Genital labial ulcer   • Bipolar 1 disorder ([facility])   • Schizophrenia ([place] [address] )   • Essential hypertension   • Other fatigue   • Tobacco abuse   • Dyslipidemia, goal LDL below 70   • Well adult exam   • Finger infection   • Hematoma   • C  difficile colitis   • Unstable angina ([facility])   • Non-ischemic cardiomyopathy ([facility])     Past Medical History:   Diagnosis Date   • Anxiety    • Bipolar 1 disorder ([place] [address] ) 7/24/2020   • Bipolar 1 disorder ([place] [address] ) 7/24/2020   • Bipolar disease, manic ([facility])    • Depression    • Gastroesophageal reflux disease without esophagitis 7/24/2020   • Schizophrenia ([place] [address] )      Social History     Socioeconomic History   • Marital status: /Civil Union     Spouse name: Not on file   • Number of children: Not on file   • Years of education: Not on file   • Highest education level: Not on file   Occupational History   • Not on file   Tobacco Use   • Smoking status: Every Day     Packs/day: 0 25     Types: Cigarettes   • Smokeless tobacco: Never   Vaping Use   • Vaping Use: Never used   Substance and Sexual Activity   • Alcohol use: Not Currently   • Drug use: Not Currently     Types: Methamphetamines   • Sexual activity: Not on file   Other Topics Concern   • Not on file   Social History Narrative    · Do you currently or have you served in the [facility] [employer]):   No      · Were you activated, into active duty, as a member of the [employer] or as a Reservist:   No        · Exercise level:   None      · Diet:   Regular      · General stress level:   High       · Caffeine intake:    Moderate        · Seat belts used routinely:   Yes      · Smoke alarm in home:   Yes      Social Determinants of Health     Financial Resource Strain: Not on file   Food Insecurity: Not on file   Transportation Needs: Not on file Physical Activity: Not on file   Stress: Not on file   Social Connections: Not on file   Intimate Partner Violence: Not on file   Housing Stability: Not on file      Family History   Problem Relation Age of Onset   • Heart attack Mother    • Heart disease Mother    • Heart failure Father    • COPD Father    • Liver cancer Sister    • Colon cancer Sister 59   • Kidney cancer Sister    • Breast cancer Maternal Grandmother    • Heart disease Brother    • Colon cancer Brother 79   • Liver cancer Brother    • Heart attack Maternal Uncle      Past Surgical History:   Procedure Laterality Date   • CARDIAC CATHETERIZATION N/A 10/24/2022    Procedure: Cardiac Coronary Angiogram;  Surgeon: Joshua Christine MD;  Location: [address] [place] [address] CATH LAB;   Service: Cardiology   •  SECTION      x2   • HYSTERECTOMY     • KNEE SURGERY Left     4 times   • LEG SURGERY Right     thony placed then removed, broken femur   • NEUROPLASTY / TRANSPOSITION MEDIAN NERVE AT CARPAL TUNNEL         Current Outpatient Medications:   •  aspirin (ECOTRIN LOW STRENGTH) 81 mg EC tablet, Take 1 tablet (81 mg total) by mouth daily, Disp: 90 tablet, Rfl: 3  •  atorvastatin (LIPITOR) 20 mg tablet, Take 1 tablet (20 mg total) by mouth daily, Disp: 60 tablet, Rfl: 6  •  esomeprazole (NexIUM) 40 MG capsule, take 1 capsule by mouth twice a day before meals, Disp: 60 capsule, Rfl: 3  •  metoprolol tartrate (LOPRESSOR) 50 mg tablet, Take 1 tablet (50 mg total) by mouth every 12 (twelve) hours, Disp: 60 tablet, Rfl: 3  •  PARoxetine (PAXIL) 40 MG tablet, Take 1 tablet (40 mg total) by mouth every morning, Disp: 90 tablet, Rfl: 1  •  sacubitril-valsartan (Entresto) 24-26 MG TABS, Take 1 tablet by mouth 2 (two) times a day, Disp: 120 tablet, Rfl: 4  •  zolpidem (AMBIEN) 10 mg tablet, Take 1 tablet (10 mg total) by mouth daily at bedtime as needed for sleep, Disp: 30 tablet, Rfl: 5  Allergies   Allergen Reactions   • Ciprofloxacin Hives   • Wound Dressing Adhesive Other (See Comments)     unknown   • Atarax [Hydroxyzine] Palpitations       Labs:  Lab Results   Component Value Date    WBC 8 07 10/20/2022    HGB 16 1 (H) 10/20/2022    HCT 47 8 (H) 10/20/2022    MCV 89 10/20/2022     10/20/2022     Lab Results   Component Value Date    CALCIUM 9 9 11/15/2022    K 4 8 11/15/2022    CO2 29 11/15/2022     11/15/2022    BUN 19 11/15/2022    CREATININE 0 73 11/15/2022     No results found for: HGBA1C  No results found for: CHOL  Lab Results   Component Value Date    HDL 50 11/15/2022    HDL 60 07/28/2020     Lab Results   Component Value Date    LDLCALC 108 (H) 11/15/2022    LDLCALC 97 07/28/2020     Lab Results   Component Value Date    TRIG 147 11/15/2022    TRIG 213 (H) 07/28/2020     No results found for: CHOLHDL    Review of Systems:  Review of Systems   Constitutional: Negative  Negative for chills, fatigue and fever  HENT: Negative  Eyes: Negative  Respiratory: Negative  Negative for chest tightness, shortness of breath and wheezing  Cardiovascular: Negative for chest pain, palpitations and leg swelling  Gastrointestinal: Negative for nausea and vomiting  Endocrine: Negative  Musculoskeletal: Negative  Neurological: Negative for dizziness, syncope, weakness and light-headedness  Hematological: Negative for adenopathy  Psychiatric/Behavioral: Negative for agitation  All other systems reviewed and are negative  Physical Exam:  Physical Exam  Vitals and nursing note reviewed  Constitutional:       General: She is not in acute distress  Appearance: Normal appearance  She is not ill-appearing or diaphoretic  HENT:      Head: Normocephalic and atraumatic  Eyes:      Extraocular Movements: Extraocular movements intact  Cardiovascular:      Rate and Rhythm: Regular rhythm  Bradycardia present  Heart sounds: No murmur heard  No friction rub  No gallop     Pulmonary:      Effort: Pulmonary effort is normal  No respiratory distress  Breath sounds: Normal breath sounds  Abdominal:      General: There is no distension  Palpations: Abdomen is soft  Musculoskeletal:         General: No swelling  Normal range of motion  Skin:     General: Skin is warm and dry  Capillary Refill: Capillary refill takes less than 2 seconds  Coloration: Skin is not pale  Neurological:      General: No focal deficit present  Mental Status: She is alert and oriented to person, place, and time  Cranial Nerves: No cranial nerve deficit     Psychiatric:         Mood and Affect: Mood normal          Behavior: Behavior normal

## 2023-04-06 ENCOUNTER — OFFICE VISIT (OUTPATIENT)
Dept: FAMILY MEDICINE CLINIC | Facility: CLINIC | Age: 48
End: 2023-04-06

## 2023-04-06 VITALS
WEIGHT: 175.6 LBS | HEIGHT: 64 IN | DIASTOLIC BLOOD PRESSURE: 90 MMHG | OXYGEN SATURATION: 96 % | HEART RATE: 68 BPM | RESPIRATION RATE: 18 BRPM | BODY MASS INDEX: 29.98 KG/M2 | TEMPERATURE: 97.9 F | SYSTOLIC BLOOD PRESSURE: 144 MMHG

## 2023-04-06 DIAGNOSIS — E78.5 DYSLIPIDEMIA, GOAL LDL BELOW 70: ICD-10-CM

## 2023-04-06 DIAGNOSIS — Z72.0 TOBACCO ABUSE: ICD-10-CM

## 2023-04-06 DIAGNOSIS — F41.9 ANXIETY: ICD-10-CM

## 2023-04-06 DIAGNOSIS — I10 ESSENTIAL HYPERTENSION: ICD-10-CM

## 2023-04-06 DIAGNOSIS — F33.41 RECURRENT MAJOR DEPRESSIVE DISORDER, IN PARTIAL REMISSION (HCC): Primary | ICD-10-CM

## 2023-04-06 DIAGNOSIS — G47.00 INSOMNIA, UNSPECIFIED TYPE: ICD-10-CM

## 2023-04-06 DIAGNOSIS — F31.9 BIPOLAR 1 DISORDER (HCC): ICD-10-CM

## 2023-04-06 DIAGNOSIS — K21.9 GASTROESOPHAGEAL REFLUX DISEASE WITHOUT ESOPHAGITIS: ICD-10-CM

## 2023-04-06 RX ORDER — ALPRAZOLAM 0.5 MG/1
0.5 TABLET ORAL 2 TIMES DAILY PRN
Qty: 60 TABLET | Refills: 3 | Status: SHIPPED | OUTPATIENT
Start: 2023-04-06

## 2023-04-06 RX ORDER — AMLODIPINE BESYLATE 5 MG/1
5 TABLET ORAL DAILY
Qty: 30 TABLET | Refills: 5 | Status: SHIPPED | OUTPATIENT
Start: 2023-04-06

## 2023-04-06 RX ORDER — ZOLPIDEM TARTRATE 10 MG/1
10 TABLET ORAL
Qty: 30 TABLET | Refills: 5 | Status: SHIPPED | OUTPATIENT
Start: 2023-04-06

## 2023-04-06 RX ORDER — BUPROPION HYDROCHLORIDE 150 MG/1
150 TABLET ORAL EVERY MORNING
Qty: 90 TABLET | Refills: 3 | Status: SHIPPED | OUTPATIENT
Start: 2023-04-06 | End: 2024-03-31

## 2023-04-06 NOTE — PROGRESS NOTES
Name: Jeanine Toney      : 1975      MRN: [id number]  Encounter Provider: Buddy Weller MD  Encounter Date: 2023   Encounter department: [address] [place] [address] Place     1  Recurrent major depressive disorder, in partial remission [facility])  Assessment & Plan:  Patient to continue utilizing medical therapy as well and counseling sources as applicable for condition  If  suicidal thought or fear of suicide to contact 911 and seek help immediately  Meds reviewed and patient questions answered today    Orders:  -     buPROPion (WELLBUTRIN XL) 150 mg 24 hr tablet; Take 1 tablet (150 mg total) by mouth every morning    2  Gastroesophageal reflux disease without esophagitis  Assessment & Plan:  Patient to continue with present therapy and decrease caffeine, avoid ETOH and smoking to decrease acid production  Pt should also cease eating prior to bedtime and avoid excessive fluid intake prior to sleep  May use antacids as needed for breakthrough GERD  All pateint questions answered today about this condition  3  Bipolar 1 disorder ([facility] )  Assessment & Plan:  Patient is stable  and will continue present plan of care and reassess at next routine visit  All questions about this problem from patient were answered today  4  Tobacco abuse  Assessment & Plan:  Patient encouraged to quit using tobacco for that increases their risk for COPD, lung cancer,stroke, oral cancer and heart disease  If patient does not want to quit they should let me know  when they are interested in quitting  There are numerous options to use to quit and we can discuss them  Orders:  -     buPROPion (WELLBUTRIN XL) 150 mg 24 hr tablet; Take 1 tablet (150 mg total) by mouth every morning    5  Essential hypertension  -     amLODIPine (NORVASC) 5 mg tablet; Take 1 tablet (5 mg total) by mouth daily    6  Anxiety  -     ALPRAZolam (XANAX) 0 5 mg tablet;  Take 1 tablet (0 5 mg total) by mouth 2 (two) times a day as needed for anxiety    7  Insomnia, unspecified type  -     zolpidem (AMBIEN) 10 mg tablet; Take 1 tablet (10 mg total) by mouth daily at bedtime as needed for sleep    8  Dyslipidemia, goal LDL below 70  -     Comprehensive metabolic panel; Future  -     Lipid Panel with Direct LDL reflex; Future        Depression Screening and Follow-up Plan: Patient assessed for underlying major depression  Brief counseling provided and recommend additional follow-up/re-evaluation next office visit  Continue regular follow-up with their mental health provider who is managing their mental health condition(s)  Patient advised to follow-up with PCP for further management  Subjective     This is a 78-year-old lady here for multiple medical problems a patient with depression, GERD history of bipolar tobacco abuse and currently under a lot of stress  Patient's brother was in the hospital currently she has been having a lot of family stress with death in the family and stressful family situations  Patient is been taking the Ambien at night to sleep which helps in combination with her Xanax we can see about continue with that  Patient also states she is having a lot of snapping during the day and flying off the handle she is getting Paxil we will see about adding some Wellbutrin to that as well as make her Xanax twice a day to see if it is good to be beneficial for her though Wellbutrin is also being added because she wants to quit smoking also which now is not the best time for her but if she wants to do that we can see about doing that now shows she can get him on the Wellbutrin to get off of that  Patient also is due for cholesterol we will get that for her next visit and we will see her back in approximately 2 months    Patient also had some high blood pressure she was recently seen by cardiology and they decreased her beta-blocker to half a tablet a day her blood pressure is little high we will Serafin Norvasc 5 mg she is seeing her cardia cardiologist soon and I will see her back at her 2-month visit  Review of Systems    Past Medical History:   Diagnosis Date   • Anxiety    • Bipolar 1 disorder ([facility] ) 2020   • Bipolar 1 disorder ([facility] ) 2020   • Bipolar disease, manic ([place] [address] )    • Depression    • Gastroesophageal reflux disease without esophagitis 2020   • Schizophrenia [facility])      Past Surgical History:   Procedure Laterality Date   • CARDIAC CATHETERIZATION N/A 10/24/2022    Procedure: Cardiac Coronary Angiogram;  Surgeon: Roly Rivera MD;  Location: [address] [place] [address] CATH LAB;   Service: Cardiology   •  SECTION      x2   • HYSTERECTOMY     • KNEE SURGERY Left     4 times   • LEG SURGERY Right     thony placed then removed, broken femur   • NEUROPLASTY / TRANSPOSITION MEDIAN NERVE AT CARPAL TUNNEL       Family History   Problem Relation Age of Onset   • Heart attack Mother    • Heart disease Mother    • Heart failure Father    • COPD Father    • Liver cancer Sister    • Colon cancer Sister 59   • Kidney cancer Sister    • Breast cancer Maternal Grandmother    • Heart disease Brother    • Colon cancer Brother 79   • Liver cancer Brother    • Heart attack Maternal Uncle      Social History     Socioeconomic History   • Marital status: /Civil Union     Spouse name: None   • Number of children: None   • Years of education: None   • Highest education level: None   Occupational History   • None   Tobacco Use   • Smoking status: Every Day     Packs/day: 0 25     Types: Cigarettes   • Smokeless tobacco: Never   Vaping Use   • Vaping Use: Never used   Substance and Sexual Activity   • Alcohol use: Not Currently   • Drug use: Not Currently     Types: Methamphetamines   • Sexual activity: None   Other Topics Concern   • None   Social History Narrative    · Do you currently or have you served in the [employer]:   No      · Were you activated, into active duty, as a member of the [employer] or "as a Reservist:   No        · Exercise level:   None      · Diet:   Regular      · General stress level:   High       · Caffeine intake:    Moderate        · Seat belts used routinely:   Yes      · Smoke alarm in home:   Yes      Social Determinants of Health     Financial Resource Strain: Not on file   Food Insecurity: Not on file   Transportation Needs: Not on file   Physical Activity: Not on file   Stress: Not on file   Social Connections: Not on file   Intimate Partner Violence: Not on file   Housing Stability: Not on file     Current Outpatient Medications on File Prior to Visit   Medication Sig   • aspirin (ECOTRIN LOW STRENGTH) 81 mg EC tablet Take 1 tablet (81 mg total) by mouth daily   • atorvastatin (LIPITOR) 20 mg tablet Take 1 tablet (20 mg total) by mouth daily   • esomeprazole (NexIUM) 40 MG capsule take 1 capsule by mouth twice a day before meals   • metoprolol tartrate (LOPRESSOR) 25 mg tablet Take 1 tablet (25 mg total) by mouth every 12 (twelve) hours   • PARoxetine (PAXIL) 40 MG tablet Take 1 tablet (40 mg total) by mouth every morning   • ranolazine (RANEXA) 500 mg 12 hr tablet Take 1 tablet (500 mg total) by mouth 2 (two) times a day   • sacubitril-valsartan (Entresto) 24-26 MG TABS Take 1 tablet by mouth 2 (two) times a day   • [DISCONTINUED] zolpidem (AMBIEN) 10 mg tablet Take 1 tablet (10 mg total) by mouth daily at bedtime as needed for sleep     Allergies   Allergen Reactions   • Ciprofloxacin Hives   • Wound Dressing Adhesive Other (See Comments)     unknown   • Atarax [Hydroxyzine] Palpitations     Immunization History   Administered Date(s) Administered   • INFLUENZA 12/02/2014   • Tuberculin Skin Test-PPD Intradermal 12/17/2012       Objective     /90 (BP Location: Left arm, Patient Position: Sitting, Cuff Size: Standard)   Pulse 68   Temp 97 9 °F (36 6 °C)   Resp 18   Ht 5' 4\" (1 626 m)   Wt 79 7 kg (175 lb 9 6 oz)   SpO2 96%   BMI 30 14 kg/m²     Physical Exam  Krystyna Peñaloza " Catie Winkler MD

## 2023-05-04 ENCOUNTER — OFFICE VISIT (OUTPATIENT)
Dept: CARDIOLOGY CLINIC | Facility: CLINIC | Age: 48
End: 2023-05-04

## 2023-05-04 VITALS
OXYGEN SATURATION: 95 % | HEART RATE: 55 BPM | BODY MASS INDEX: 31.58 KG/M2 | RESPIRATION RATE: 20 BRPM | HEIGHT: 64 IN | SYSTOLIC BLOOD PRESSURE: 130 MMHG | WEIGHT: 185 LBS | DIASTOLIC BLOOD PRESSURE: 78 MMHG

## 2023-05-04 DIAGNOSIS — I20.8 MICROVASCULAR ANGINA (HCC): Primary | ICD-10-CM

## 2023-05-04 DIAGNOSIS — I10 ESSENTIAL HYPERTENSION: ICD-10-CM

## 2023-05-04 DIAGNOSIS — Z12.9 CANCER SCREENING: ICD-10-CM

## 2023-05-04 NOTE — PROGRESS NOTES
"                                           Cardiology Follow Up    Mikemellissasrinivas Alvarez  1975  [id number]  [facility] [place]  [address] [address] [phone]    Discussion/Summary:  1  Microvascular angina  2  Non-ischemic myocardial injury with last known LV function of 40%  NYHA class I  3  LBBB  4  Bradycardia   5  Tobacco abuse  6  Nonobstructive CAD      Cont with ASA and statin    Reports concern for \"afib\" on her watch  Recent event monitor reviewed  No afib but was for 1 week  Will check an additional 3 week monitor    Her symptoms are otherwise better  CP significantly better on ranexa    Cont to smoke  She is trying to quit  Cont to encourage tobacco cessation  Strongly recommended follow-up with GI/PCP given family history of malignancy  She states she is working on getting her colonoscopy  Recommend patient presents to the emergency room or call our office if he has any new/persistent or progressive symptoms  Previous studies were reviewed  Safety measures were reviewed  Questions were entertained and answered  Patient was advised to report any problems requiring medical attention  Follow-up with PCP and appropriate specialist and lab work as discussed  Return for follow up visit as scheduled or earlier, if needed  Thank you for allowing me to participate in the care and evaluation of your patient  Should you have any questions, please feel free to contact me  History of Present Illness:     Milagro Grey is a 46 y o  female who presents for follow up for cardiovascular care  Denies chest pain, chest pressure, shortness of breath, dizziness, lightheadedness, presyncope or syncope  Denies orthopnea, PND or lower limb edema      CP is a lot better since starting ranexa    Cardiac cath 10/22:   No significant obstructive coronary artery disease     No significant obstructive coronary " artery disease  Echo 1/13/23:    Nicola Lasso: Left ventricular cavity size is normal  Wall thickness is normal  Systolic function is moderately reduced (40%)  There is moderate global hypokinesis with regional variation  Diastolic function is normal     Right Ventricle: Right ventricular cavity size is normal  Systolic function is normal     Mitral Valve: There is mild to moderate regurgitation    Tricuspid Valve: There is trace to mild regurgitation        Patient Active Problem List   Diagnosis    Recurrent major depressive disorder, in partial remission ([facility] )    Dysuria    Gastroesophageal reflux disease without esophagitis    Genital labial ulcer    Bipolar 1 disorder ([place] [address] )    Schizophrenia ([facility] )    Essential hypertension    Other fatigue    Tobacco abuse    Dyslipidemia, goal LDL below 79    Well adult exam    Finger infection    Hematoma    C  difficile colitis    Unstable angina ([place] [address] )    Non-ischemic cardiomyopathy ([place] [address] )     Past Medical History:   Diagnosis Date    Anxiety     Bipolar 1 disorder ([place] [address] ) 7/24/2020    Bipolar 1 disorder ([place] [address] ) 7/24/2020    Bipolar disease, manic ([place] [address] )     Depression     Gastroesophageal reflux disease without esophagitis 7/24/2020    Schizophrenia ([place] [address] )      Social History     Socioeconomic History    Marital status: /Civil Union     Spouse name: Not on file    Number of children: Not on file    Years of education: Not on file    Highest education level: Not on file   Occupational History    Not on file   Tobacco Use    Smoking status: Every Day     Packs/day: 0 25     Types: Cigarettes    Smokeless tobacco: Never   Vaping Use    Vaping Use: Never used   Substance and Sexual Activity    Alcohol use: Not Currently    Drug use: Not Currently     Types: Methamphetamines    Sexual activity: Not on file   Other Topics Concern    Not on file   Social History Narrative    · Do you currently or have you served in the [employer]:   No      · Were you activated, into active duty, as a member of the [employer] or as a Reservist:   No        · Exercise level:   None      · Diet:   Regular      · General stress level:   High       · Caffeine intake: Moderate        · Seat belts used routinely:   Yes      · Smoke alarm in home:   Yes      Social Determinants of Health     Financial Resource Strain: Not on file   Food Insecurity: Not on file   Transportation Needs: Not on file   Physical Activity: Not on file   Stress: Not on file   Social Connections: Not on file   Intimate Partner Violence: Not on file   Housing Stability: Not on file      Family History   Problem Relation Age of Onset    Heart attack Mother     Heart disease Mother     Heart failure Father     COPD Father     Liver cancer Sister     Colon cancer Sister 59    Kidney cancer Sister     Breast cancer Maternal Grandmother     Heart disease Brother     Colon cancer Brother 79    Liver cancer Brother     Heart attack Maternal Uncle      Past Surgical History:   Procedure Laterality Date    CARDIAC CATHETERIZATION N/A 10/24/2022    Procedure: Cardiac Coronary Angiogram;  Surgeon: Anayeli Noriega MD;  Location: [address] [place] [address] CATH LAB;   Service: Cardiology     SECTION      x2    HYSTERECTOMY      KNEE SURGERY Left     4 times    LEG SURGERY Right     thony placed then removed, broken femur    NEUROPLASTY / TRANSPOSITION MEDIAN NERVE AT CARPAL TUNNEL         Current Outpatient Medications:     ALPRAZolam (XANAX) 0 5 mg tablet, Take 1 tablet (0 5 mg total) by mouth 2 (two) times a day as needed for anxiety, Disp: 60 tablet, Rfl: 3    amLODIPine (NORVASC) 5 mg tablet, Take 1 tablet (5 mg total) by mouth daily, Disp: 30 tablet, Rfl: 5    aspirin (ECOTRIN LOW STRENGTH) 81 mg EC tablet, Take 1 tablet (81 mg total) by mouth daily, Disp: 90 tablet, Rfl: 3    atorvastatin (LIPITOR) 20 mg tablet, Take 1 tablet (20 mg total) by mouth daily, Disp: 60 tablet, Rfl: 6    buPROPion (WELLBUTRIN XL) 150 mg 24 hr tablet, Take 1 tablet (150 mg total) by mouth every morning, Disp: 90 tablet, Rfl: 3    esomeprazole (NexIUM) 40 MG capsule, take 1 capsule by mouth twice a day before meals, Disp: 60 capsule, Rfl: 3    metoprolol tartrate (LOPRESSOR) 25 mg tablet, Take 1 tablet (25 mg total) by mouth every 12 (twelve) hours, Disp: 60 tablet, Rfl: 3    PARoxetine (PAXIL) 40 MG tablet, Take 1 tablet (40 mg total) by mouth every morning, Disp: 90 tablet, Rfl: 1    ranolazine (RANEXA) 500 mg 12 hr tablet, Take 1 tablet (500 mg total) by mouth 2 (two) times a day, Disp: 60 tablet, Rfl: 2    sacubitril-valsartan (Entresto) 24-26 MG TABS, Take 1 tablet by mouth 2 (two) times a day, Disp: 120 tablet, Rfl: 4    zolpidem (AMBIEN) 10 mg tablet, Take 1 tablet (10 mg total) by mouth daily at bedtime as needed for sleep, Disp: 30 tablet, Rfl: 5  Allergies   Allergen Reactions    Ciprofloxacin Hives    Wound Dressing Adhesive Other (See Comments)     unknown    Atarax [Hydroxyzine] Palpitations       Labs:  Lab Results   Component Value Date    WBC 8 07 10/20/2022    HGB 16 1 (H) 10/20/2022    HCT 47 8 (H) 10/20/2022    MCV 89 10/20/2022     10/20/2022     Lab Results   Component Value Date    CALCIUM 9 9 11/15/2022    K 4 8 11/15/2022    CO2 29 11/15/2022     11/15/2022    BUN 19 11/15/2022    CREATININE 0 73 11/15/2022     No results found for: HGBA1C  No results found for: CHOL  Lab Results   Component Value Date    HDL 50 11/15/2022    HDL 60 07/28/2020     Lab Results   Component Value Date    LDLCALC 108 (H) 11/15/2022    LDLCALC 97 07/28/2020     Lab Results   Component Value Date    TRIG 147 11/15/2022    TRIG 213 (H) 07/28/2020     No results found for: CHOLHDL    Review of Systems:  Review of Systems   Constitutional: Negative  Negative for chills, fatigue and fever  HENT: Negative  Eyes: Negative  Respiratory: Negative    Negative for chest tightness, shortness of breath and wheezing  Cardiovascular: Negative for chest pain, palpitations and leg swelling  Gastrointestinal: Negative for nausea and vomiting  Endocrine: Negative  Musculoskeletal: Negative  Neurological: Negative for dizziness, syncope, weakness and light-headedness  Hematological: Negative for adenopathy  Psychiatric/Behavioral: Negative for agitation  All other systems reviewed and are negative  Physical Exam:  Physical Exam  Vitals and nursing note reviewed  Constitutional:       General: She is not in acute distress  Appearance: Normal appearance  She is not ill-appearing or diaphoretic  HENT:      Head: Normocephalic and atraumatic  Eyes:      Extraocular Movements: Extraocular movements intact  Cardiovascular:      Rate and Rhythm: Normal rate and regular rhythm  Heart sounds: No murmur heard  No friction rub  No gallop  Pulmonary:      Effort: Pulmonary effort is normal  No respiratory distress  Breath sounds: Normal breath sounds  Abdominal:      General: There is no distension  Palpations: Abdomen is soft  Musculoskeletal:         General: No swelling  Normal range of motion  Skin:     General: Skin is warm and dry  Capillary Refill: Capillary refill takes less than 2 seconds  Coloration: Skin is not pale  Neurological:      General: No focal deficit present  Mental Status: She is alert and oriented to person, place, and time  Cranial Nerves: No cranial nerve deficit     Psychiatric:         Mood and Affect: Mood normal          Behavior: Behavior normal

## 2023-05-19 DIAGNOSIS — Z80.9 FAMILY HISTORY OF CANCER: Primary | ICD-10-CM

## 2023-05-19 DIAGNOSIS — R19.7 DIARRHEA, UNSPECIFIED TYPE: ICD-10-CM

## 2023-05-19 RX ORDER — OCTISALATE, AVOBENZONE, HOMOSALATE, AND OCTOCRYLENE 29.4; 29.4; 49; 25.48 MG/ML; MG/ML; MG/ML; MG/ML
4 LOTION TOPICAL DAILY
Qty: 30 CAPSULE | Refills: 11 | Status: SHIPPED | OUTPATIENT
Start: 2023-05-19 | End: 2023-08-09

## 2023-05-22 ENCOUNTER — TELEPHONE (OUTPATIENT)
Dept: HEMATOLOGY ONCOLOGY | Facility: CLINIC | Age: 48
End: 2023-05-22

## 2023-05-22 NOTE — TELEPHONE ENCOUNTER
I called Carrie Cooney to schedule a new patient appointment with the Cancer Risk and Genetics Program       Outcome:  Genetics appointment scheduled for 11/7/23 1pm     Personal/Family History Related to Appointment:   Fhx Liver ca (sister) (broother)  Fhx kidney ca (sister)  Fhx colon ca(sister) (Brother)(m  Uncle)  Fhx leukemia (brother)  Fhx Breast ca ([place])  Fhx lung ca (brother)  Fhx ovarian ca (m cousin)  No personal h/o ca  Non Worship       History of Genetic Testing:  Patient reports no personal or family history of genetic testing    Genetics Family History Questionnaire:  I confirmed the patient's e-mail on file as the best e-mail to send an invite link for our genetics family history intake

## 2023-05-24 ENCOUNTER — TELEPHONE (OUTPATIENT)
Dept: GENETICS | Facility: CLINIC | Age: 48
End: 2023-05-24

## 2023-05-24 NOTE — TELEPHONE ENCOUNTER
Patient called to let us know that she was unable to complete the progeny questionnaire as it would not allow her to move forward and correct some of the information she entered  I advised we will completed during visit

## 2023-05-30 ENCOUNTER — CLINICAL SUPPORT (OUTPATIENT)
Dept: CARDIOLOGY CLINIC | Facility: CLINIC | Age: 48
End: 2023-05-30

## 2023-05-30 DIAGNOSIS — I20.8 MICROVASCULAR ANGINA (HCC): ICD-10-CM

## 2023-06-07 ENCOUNTER — TELEPHONE (OUTPATIENT)
Dept: FAMILY MEDICINE CLINIC | Facility: CLINIC | Age: 48
End: 2023-06-07

## 2023-06-07 DIAGNOSIS — R32 URINARY INCONTINENCE, UNSPECIFIED TYPE: Primary | ICD-10-CM

## 2023-06-07 NOTE — TELEPHONE ENCOUNTER
She needs to see a urogynecologist who specializes in this subject   I will make a referral to Dr Nicolette Johnson

## 2023-06-07 NOTE — TELEPHONE ENCOUNTER
Spoke with patient and she is having incontinence issues and wants to know if she should be seen or referred to a urologist

## 2023-06-17 DIAGNOSIS — K21.9 GASTROESOPHAGEAL REFLUX DISEASE, UNSPECIFIED WHETHER ESOPHAGITIS PRESENT: ICD-10-CM

## 2023-06-17 RX ORDER — ESOMEPRAZOLE MAGNESIUM 40 MG/1
CAPSULE, DELAYED RELEASE ORAL
Qty: 60 CAPSULE | Refills: 3 | Status: SHIPPED | OUTPATIENT
Start: 2023-06-17 | End: 2023-06-23 | Stop reason: SDUPTHER

## 2023-06-22 DIAGNOSIS — I20.8 MICROVASCULAR ANGINA (HCC): ICD-10-CM

## 2023-06-22 DIAGNOSIS — R00.1 BRADYCARDIA: ICD-10-CM

## 2023-06-22 RX ORDER — RANOLAZINE 500 MG/1
TABLET, EXTENDED RELEASE ORAL
Qty: 60 TABLET | Refills: 2 | Status: SHIPPED | OUTPATIENT
Start: 2023-06-22

## 2023-06-22 NOTE — TELEPHONE ENCOUNTER
Name from pharmacy: RANOLAZINE  MG TABLET          Will file in chart as: ranolazine (RANEXA) 500 mg 12 hr tablet    Sig: take 1 tablet by mouth twice a day    Disp:  60 tablet    Refills:  2 (Pharmacy requested: Not specified)    Start: 6/22/2023    Class: Normal    Non-formulary For: Bradycardia, Microvascular angina ([address] )    Last ordered: 2 months ago (3/31/2023) by Eric Guan MD    Last refill: 5/24/2023    Rx #: [id number]    Cardiovascular: Ranolazine Failed 06/22/2023 09:47 AM   Protocol Details  This refill cannot be delegated    BP completed in the last 12 months    K in normal range and within 360 days    eGFR is 60 or above and within 360 days    Valid encounter within last 12 months      To be filled at: [facility], [address]   Please review and refill if possible  Thanks!

## 2023-06-22 NOTE — PROGRESS NOTES
Name: Chemo Galarza      : 1975      MRN: [id number]  Encounter Provider: Kem Hernández MD  Encounter Date: 2023   Encounter department: [address] [place] [address] Place     1  Recurrent major depressive disorder, in partial remission [facility])  Assessment & Plan:  Patient to continue utilizing medical therapy as well and counseling sources as applicable for condition  If  suicidal thought or fear of suicide to contact 911 and seek help immediately  Meds reviewed and patient questions answered today      2  Gastroesophageal reflux disease without esophagitis  Assessment & Plan:  Patient to continue with present therapy and decrease caffeine, avoid ETOH and smoking to decrease acid production  Pt should also cease eating prior to bedtime and avoid excessive fluid intake prior to sleep  May use antacids as needed for breakthrough GERD  All pateint questions answered today about this condition  3  Essential hypertension  Assessment & Plan:  Patient is stable with current anti-hypertensive medicine and continue to follow a low sodium diet and take current medication  All questions about this condition were answered today  4  Anxiety  -     ALPRAZolam (XANAX) 0 5 mg tablet; Take 1 tablet (0 5 mg total) by mouth 2 (two) times a day as needed for anxiety    5  Insomnia, unspecified type    6  Tobacco abuse  Assessment & Plan:  Patient encouraged to quit using tobacco for that increases their risk for COPD, lung cancer,stroke, oral cancer and heart disease  If patient does not want to quit they should let me know  when they are interested in quitting  There are numerous options to use to quit and we can discuss them  7  Bipolar 1 disorder ([facility] )    8  Gastroesophageal reflux disease, unspecified whether esophagitis present  -     esomeprazole (NexIUM) 40 MG capsule; Take 1 capsule (40 mg total) by mouth 2 (two) times a day before meals    9   Unstable angina (HCC)  - aspirin (ECOTRIN LOW STRENGTH) 81 mg EC tablet; Take 1 tablet (81 mg total) by mouth daily        Depression Screening and Follow-up Plan: Patient assessed for underlying major depression  Brief counseling provided and recommend additional follow-up/re-evaluation next office visit  Patient advised to follow-up with PCP for further management  Subjective     64-year-old female here today for checkup on multimedical problem patient with some depression anxiety hypertension GERD and recently had some gastroenteritis and she is improving slowly  Patient with insomnia obstructive CAD and bradycardia  Patient is on her Lopressor 25 mg is still having heart rates in the 30s and 40s I have told her to contact her cardiologist and let him know this is happening  In regards to the rest of her medication she is doing very well and needs a refill on her Xanax there is her medicine she is good with for right now  She does have some gastroenteritis recently which she got from a friend that is resolving and I told her about increasing her fluids and withdrawing any kind of solid foods for right now and slowly advance her diet as tolerated she may use medicines to help with the diarrhea but is probably better just let it go and just increase it with fluids and try and resolve this on its own  Review of Systems    Past Medical History:   Diagnosis Date   • Anxiety    • Bipolar 1 disorder ([facility] ) 2020   • Bipolar 1 disorder ([facility] ) 2020   • Bipolar disease, manic ([facility] )    • Depression    • Gastroesophageal reflux disease without esophagitis 2020   • Schizophrenia [facility])      Past Surgical History:   Procedure Laterality Date   • CARDIAC CATHETERIZATION N/A 10/24/2022    Procedure: Cardiac Coronary Angiogram;  Surgeon: Claire Anderson MD;  Location: [address] [place] [address] CATH LAB;   Service: Cardiology   •  SECTION      x2   • HYSTERECTOMY     • KNEE SURGERY Left     4 times   • LEG SURGERY Right     thony placed then removed, broken femur   • NEUROPLASTY / TRANSPOSITION MEDIAN NERVE AT CARPAL TUNNEL       Family History   Problem Relation Age of Onset   • Heart attack Mother    • Heart disease Mother    • Heart failure Father    • COPD Father    • Liver cancer Sister    • Colon cancer Sister 59   • Kidney cancer Sister    • Breast cancer Maternal Grandmother    • Heart disease Brother    • Colon cancer Brother 79   • Liver cancer Brother    • Heart attack Maternal Uncle      Social History     Socioeconomic History   • Marital status: /Civil Union     Spouse name: None   • Number of children: None   • Years of education: None   • Highest education level: None   Occupational History   • None   Tobacco Use   • Smoking status: Every Day     Packs/day: 0 25     Types: Cigarettes   • Smokeless tobacco: Never   Vaping Use   • Vaping Use: Never used   Substance and Sexual Activity   • Alcohol use: Not Currently   • Drug use: Not Currently     Types: Methamphetamines   • Sexual activity: None   Other Topics Concern   • None   Social History Narrative    · Do you currently or have you served in the [employer]:   No      · Were you activated, into active duty, as a member of the [email or web address] or as a Reservist:   No        · Exercise level:   None      · Diet:   Regular      · General stress level:   High       · Caffeine intake: Moderate        · Seat belts used routinely:   Yes      · Smoke alarm in home:   Yes      Social Determinants of Health     Financial Resource Strain: Not on file   Food Insecurity: Food Insecurity Present (10/26/2021)    Hunger Vital Sign    • Worried About Running Out of Food in the Last Year: Sometimes true    • Ran Out of Food in the Last Year: Sometimes true   Transportation Needs: No Transportation Needs (10/26/2021)    PRAPARE - Transportation    • Lack of Transportation (Medical): No    • Lack of Transportation (Non-Medical):  No   Physical Activity: Not on file   Stress: Not on file Social Connections: Not on file   Intimate Partner Violence: Not on file   Housing Stability: High Risk (10/26/2021)    Housing Stability Vital Sign    • Unable to Pay for Housing in the Last Year: Yes    • Number of Places Lived in the Last Year: 2    • Unstable Housing in the Last Year: Yes     Current Outpatient Medications on File Prior to Visit   Medication Sig   • amLODIPine (NORVASC) 5 mg tablet Take 1 tablet (5 mg total) by mouth daily   • atorvastatin (LIPITOR) 20 mg tablet Take 1 tablet (20 mg total) by mouth daily   • buPROPion (WELLBUTRIN XL) 150 mg 24 hr tablet Take 1 tablet (150 mg total) by mouth every morning   • Flowflex COVID-19 Ag Home Test KIT    • fluconazole (DIFLUCAN) 150 mg tablet take 1 tablet by mouth AS A ONE TIME DOSE   • metoprolol tartrate (LOPRESSOR) 25 mg tablet Take 1 tablet (25 mg total) by mouth every 12 (twelve) hours   • PARoxetine (PAXIL) 40 MG tablet Take 1 tablet (40 mg total) by mouth every morning   • Probiotic Product (Align) 4 MG CAPS Take 1 capsule (4 mg total) by mouth in the morning   • psyllium (METAMUCIL) 0 52 g capsule 2 capsules BID   • ranolazine (RANEXA) 500 mg 12 hr tablet take 1 tablet by mouth twice a day   • sacubitril-valsartan (Entresto) 24-26 MG TABS Take 1 tablet by mouth 2 (two) times a day   • zolpidem (AMBIEN) 10 mg tablet Take 1 tablet (10 mg total) by mouth daily at bedtime as needed for sleep   • [DISCONTINUED] ALPRAZolam (XANAX) 0 5 mg tablet Take 1 tablet (0 5 mg total) by mouth 2 (two) times a day as needed for anxiety   • [DISCONTINUED] aspirin (ECOTRIN LOW STRENGTH) 81 mg EC tablet Take 1 tablet (81 mg total) by mouth daily   • [DISCONTINUED] esomeprazole (NexIUM) 40 MG capsule take 1 capsule by mouth twice a day before meals     Allergies   Allergen Reactions   • Ciprofloxacin Hives   • Wound Dressing Adhesive Other (See Comments)     unknown   • Atarax [Hydroxyzine] Palpitations     Immunization History   Administered Date(s) Administered "  • INFLUENZA 12/02/2014   • Tuberculin Skin Test-PPD Intradermal 12/17/2012       Objective     /70 (BP Location: Left arm, Patient Position: Sitting, Cuff Size: Standard)   Pulse (!) 45   Temp 98 2 °F (36 8 °C)   Resp 18   Ht 5' 4\" (1 626 m)   Wt 85 7 kg (189 lb)   SpO2 98%   BMI 32 44 kg/m²     Physical Exam  Marc Ambrocio MD  "

## 2023-06-23 ENCOUNTER — OFFICE VISIT (OUTPATIENT)
Dept: FAMILY MEDICINE CLINIC | Facility: CLINIC | Age: 48
End: 2023-06-23
Payer: COMMERCIAL

## 2023-06-23 VITALS
DIASTOLIC BLOOD PRESSURE: 70 MMHG | RESPIRATION RATE: 18 BRPM | SYSTOLIC BLOOD PRESSURE: 126 MMHG | WEIGHT: 189 LBS | TEMPERATURE: 98.2 F | OXYGEN SATURATION: 98 % | HEIGHT: 64 IN | HEART RATE: 45 BPM | BODY MASS INDEX: 32.27 KG/M2

## 2023-06-23 DIAGNOSIS — F31.9 BIPOLAR 1 DISORDER (HCC): ICD-10-CM

## 2023-06-23 DIAGNOSIS — Z72.0 TOBACCO ABUSE: ICD-10-CM

## 2023-06-23 DIAGNOSIS — K21.9 GASTROESOPHAGEAL REFLUX DISEASE, UNSPECIFIED WHETHER ESOPHAGITIS PRESENT: ICD-10-CM

## 2023-06-23 DIAGNOSIS — G47.00 INSOMNIA, UNSPECIFIED TYPE: ICD-10-CM

## 2023-06-23 DIAGNOSIS — F33.41 RECURRENT MAJOR DEPRESSIVE DISORDER, IN PARTIAL REMISSION (HCC): Primary | ICD-10-CM

## 2023-06-23 DIAGNOSIS — I10 ESSENTIAL HYPERTENSION: ICD-10-CM

## 2023-06-23 DIAGNOSIS — F41.9 ANXIETY: ICD-10-CM

## 2023-06-23 DIAGNOSIS — K21.9 GASTROESOPHAGEAL REFLUX DISEASE WITHOUT ESOPHAGITIS: ICD-10-CM

## 2023-06-23 DIAGNOSIS — I20.0 UNSTABLE ANGINA (HCC): ICD-10-CM

## 2023-06-23 PROCEDURE — 99214 OFFICE O/P EST MOD 30 MIN: CPT | Performed by: FAMILY MEDICINE

## 2023-06-23 RX ORDER — ALPRAZOLAM 0.5 MG/1
0.5 TABLET ORAL 2 TIMES DAILY PRN
Qty: 60 TABLET | Refills: 3 | Status: SHIPPED | OUTPATIENT
Start: 2023-06-23

## 2023-06-23 RX ORDER — ESOMEPRAZOLE MAGNESIUM 40 MG/1
40 CAPSULE, DELAYED RELEASE ORAL
Qty: 60 CAPSULE | Refills: 3 | Status: SHIPPED | OUTPATIENT
Start: 2023-06-23

## 2023-07-12 ENCOUNTER — OFFICE VISIT (OUTPATIENT)
Dept: CARDIOLOGY CLINIC | Facility: CLINIC | Age: 48
End: 2023-07-12
Payer: COMMERCIAL

## 2023-07-12 VITALS
OXYGEN SATURATION: 96 % | RESPIRATION RATE: 16 BRPM | DIASTOLIC BLOOD PRESSURE: 90 MMHG | SYSTOLIC BLOOD PRESSURE: 156 MMHG | WEIGHT: 190 LBS | BODY MASS INDEX: 32.44 KG/M2 | HEIGHT: 64 IN | HEART RATE: 66 BPM

## 2023-07-12 DIAGNOSIS — I42.8 NON-ISCHEMIC CARDIOMYOPATHY (HCC): ICD-10-CM

## 2023-07-12 DIAGNOSIS — R07.9 CHEST PAIN, UNSPECIFIED TYPE: ICD-10-CM

## 2023-07-12 DIAGNOSIS — I10 ESSENTIAL HYPERTENSION: Primary | ICD-10-CM

## 2023-07-12 DIAGNOSIS — I42.8 NONISCHEMIC CARDIOMYOPATHY (HCC): ICD-10-CM

## 2023-07-12 PROCEDURE — 99214 OFFICE O/P EST MOD 30 MIN: CPT | Performed by: INTERNAL MEDICINE

## 2023-07-13 ENCOUNTER — TELEPHONE (OUTPATIENT)
Dept: FAMILY MEDICINE CLINIC | Facility: CLINIC | Age: 48
End: 2023-07-13

## 2023-07-13 DIAGNOSIS — R32 URINARY INCONTINENCE, UNSPECIFIED TYPE: Primary | ICD-10-CM

## 2023-07-13 NOTE — TELEPHONE ENCOUNTER
Patient needs transportation to her urogyn appointment but it is at [facility] and we do not provider transportation to facilities when referred out of [facility]. Can you place a referral for a [facility] doctor she is ok with having to go to [place] or [place] as she needs the transportation.

## 2023-07-17 DIAGNOSIS — I42.8 NONISCHEMIC CARDIOMYOPATHY (HCC): Primary | ICD-10-CM

## 2023-07-18 ENCOUNTER — TELEPHONE (OUTPATIENT)
Dept: LAB | Facility: HOSPITAL | Age: 48
End: 2023-07-18

## 2023-07-19 NOTE — TELEPHONE ENCOUNTER
Is there another facility within network she can go to as they do not take her insurance. I tried searching this on find a doc on the [facility] website and could not find anything.

## 2023-07-19 NOTE — TELEPHONE ENCOUNTER
I know of nobody else in [facility]. They are all through [facility].

## 2023-07-20 ENCOUNTER — APPOINTMENT (OUTPATIENT)
Dept: LAB | Facility: CLINIC | Age: 48
End: 2023-07-20
Payer: COMMERCIAL

## 2023-07-20 DIAGNOSIS — E78.5 DYSLIPIDEMIA, GOAL LDL BELOW 70: Primary | ICD-10-CM

## 2023-07-20 DIAGNOSIS — I20.0 UNSTABLE ANGINA (HCC): ICD-10-CM

## 2023-07-20 DIAGNOSIS — I42.8 NONISCHEMIC CARDIOMYOPATHY (HCC): ICD-10-CM

## 2023-07-20 LAB
ALBUMIN SERPL BCP-MCNC: 3.8 G/DL (ref 3.5–5)
ALP SERPL-CCNC: 91 U/L (ref 46–116)
ALT SERPL W P-5'-P-CCNC: 34 U/L (ref 12–78)
ANION GAP SERPL CALCULATED.3IONS-SCNC: 2 MMOL/L
AST SERPL W P-5'-P-CCNC: 17 U/L (ref 5–45)
BILIRUB SERPL-MCNC: 0.26 MG/DL (ref 0.2–1)
BUN SERPL-MCNC: 19 MG/DL (ref 5–25)
CALCIUM SERPL-MCNC: 9.5 MG/DL (ref 8.3–10.1)
CHLORIDE SERPL-SCNC: 112 MMOL/L (ref 96–108)
CHOLEST SERPL-MCNC: 184 MG/DL
CO2 SERPL-SCNC: 28 MMOL/L (ref 21–32)
CREAT SERPL-MCNC: 0.69 MG/DL (ref 0.6–1.3)
GFR SERPL CREATININE-BSD FRML MDRD: 101 ML/MIN/1.73SQ M
GLUCOSE P FAST SERPL-MCNC: 145 MG/DL (ref 65–99)
HDLC SERPL-MCNC: 62 MG/DL
LDLC SERPL CALC-MCNC: 98 MG/DL (ref 0–100)
POTASSIUM SERPL-SCNC: 4.1 MMOL/L (ref 3.5–5.3)
PROT SERPL-MCNC: 7.4 G/DL (ref 6.4–8.4)
SODIUM SERPL-SCNC: 142 MMOL/L (ref 135–147)
TRIGL SERPL-MCNC: 121 MG/DL

## 2023-07-20 PROCEDURE — 36415 COLL VENOUS BLD VENIPUNCTURE: CPT

## 2023-07-20 PROCEDURE — 82565 ASSAY OF CREATININE: CPT

## 2023-07-20 PROCEDURE — 80061 LIPID PANEL: CPT

## 2023-07-20 PROCEDURE — 80053 COMPREHEN METABOLIC PANEL: CPT

## 2023-07-20 PROCEDURE — 84520 ASSAY OF UREA NITROGEN: CPT

## 2023-07-25 ENCOUNTER — HOSPITAL ENCOUNTER (OUTPATIENT)
Dept: MRI IMAGING | Facility: HOSPITAL | Age: 48
Discharge: HOME | End: 2023-07-25
Attending: INTERNAL MEDICINE
Payer: COMMERCIAL

## 2023-07-25 DIAGNOSIS — I42.8 NONISCHEMIC CARDIOMYOPATHY (HCC): ICD-10-CM

## 2023-07-25 LAB — MISCELLANEOUS LAB TEST RESULT: NORMAL

## 2023-07-25 PROCEDURE — A9585 GADOBUTROL INJECTION: HCPCS | Performed by: INTERNAL MEDICINE

## 2023-07-25 PROCEDURE — 75561 CARDIAC MRI FOR MORPH W/DYE: CPT

## 2023-07-25 PROCEDURE — G1004 CDSM NDSC: HCPCS

## 2023-07-25 RX ADMIN — GADOBUTROL 16 ML: 604.72 INJECTION INTRAVENOUS at 16:58

## 2023-07-27 DIAGNOSIS — R32 URINARY INCONTINENCE, UNSPECIFIED TYPE: Primary | ICD-10-CM

## 2023-07-31 ENCOUNTER — TELEPHONE (OUTPATIENT)
Dept: UROLOGY | Facility: MEDICAL CENTER | Age: 48
End: 2023-07-31

## 2023-07-31 ENCOUNTER — TELEPHONE (OUTPATIENT)
Dept: CARDIOLOGY CLINIC | Facility: CLINIC | Age: 48
End: 2023-07-31

## 2023-07-31 NOTE — TELEPHONE ENCOUNTER
New Patient    What is the reason for the patient’s appointment?:Urinary incontinence, unspecified type    What office location does the patient prefer?:[place]     Does patient have Imaging/Lab Results:    Have patient records been requested?:  If No, are the records showing in Epic:       INSURANCE:   Do we accept the patient's insurance or is the patient Self-Pay?:    Insurance Provider:[employer]   Plan Type/Number:   Member ID#:       HISTORY:   Has the patient had any previous Urologist(s)? :No    Was the patient seen in the ED?:No    Has the patient had any outside testing done?:  No  Does the patient have a personal history of cancer?:No

## 2023-08-01 ENCOUNTER — TELEPHONE (OUTPATIENT)
Dept: UROLOGY | Facility: CLINIC | Age: 48
End: 2023-08-01

## 2023-08-01 NOTE — TELEPHONE ENCOUNTER
Spoke with patient and she verbally understood test isnt not approved because we are still waiting for patient MRI to be result . Patient doesn't want to reschedule she was told she will have a cost out of pocket she would have to discuss with central scheduling.       TT Dr Florencio Henderson waiting answer for MRI

## 2023-08-01 NOTE — TELEPHONE ENCOUNTER
PLEASE ADVISE     auth for stress is waiting on result for MRI CAN YOU PLEASE LOOKING THIS THANK YOU

## 2023-08-01 NOTE — TELEPHONE ENCOUNTER
Please let patient know that I donot read cardiac MRI  Will need to be read by dedicated cardiologist  We have reached out to expedite it but not sure if it will be read before tomorrow  thanks

## 2023-08-02 ENCOUNTER — HOSPITAL ENCOUNTER (OUTPATIENT)
Dept: NON INVASIVE DIAGNOSTICS | Facility: HOSPITAL | Age: 48
Discharge: HOME | End: 2023-08-02

## 2023-08-04 ENCOUNTER — TELEPHONE (OUTPATIENT)
Dept: CARDIOLOGY CLINIC | Facility: CLINIC | Age: 48
End: 2023-08-04

## 2023-08-04 DIAGNOSIS — R21 RASH: Primary | ICD-10-CM

## 2023-08-04 DIAGNOSIS — H01.001 BLEPHARITIS OF RIGHT UPPER EYELID, UNSPECIFIED TYPE: ICD-10-CM

## 2023-08-04 RX ORDER — CLOTRIMAZOLE AND BETAMETHASONE DIPROPIONATE 10; .64 MG/G; MG/G
CREAM TOPICAL 2 TIMES DAILY
Qty: 30 G | Refills: 0 | Status: SHIPPED | OUTPATIENT
Start: 2023-08-04

## 2023-08-04 RX ORDER — ERYTHROMYCIN 5 MG/G
0.5 OINTMENT OPHTHALMIC
Qty: 3.5 G | Refills: 0 | Status: SHIPPED | OUTPATIENT
Start: 2023-08-04

## 2023-08-04 NOTE — TELEPHONE ENCOUNTER
Spoke with pt. Patient verbally understood the result of her MRI cardiac  w wo contrast / Dr. Tenzin Goode message.

## 2023-08-04 NOTE — TELEPHONE ENCOUNTER
----- Message from Godfrey Joshi MD sent at 8/4/2023 12:20 PM EDT -----  Please let patient know that cardiac MRI showed slightly reduced heart function, but still better than her last echocardiogram  Otherwise unremarkable

## 2023-08-07 ENCOUNTER — APPOINTMENT (EMERGENCY)
Dept: CT IMAGING | Facility: HOSPITAL | Age: 48
End: 2023-08-07
Payer: COMMERCIAL

## 2023-08-07 ENCOUNTER — APPOINTMENT (EMERGENCY)
Dept: VASCULAR ULTRASOUND | Facility: HOSPITAL | Age: 48
End: 2023-08-07
Payer: COMMERCIAL

## 2023-08-07 ENCOUNTER — HOSPITAL ENCOUNTER (OUTPATIENT)
Facility: HOSPITAL | Age: 48
Setting detail: OBSERVATION
Discharge: HOME | End: 2023-08-09
Attending: EMERGENCY MEDICINE | Admitting: INTERNAL MEDICINE
Payer: COMMERCIAL

## 2023-08-07 DIAGNOSIS — R21 RASH: ICD-10-CM

## 2023-08-07 DIAGNOSIS — R07.9 CHEST PAIN: Primary | ICD-10-CM

## 2023-08-07 DIAGNOSIS — R06.00 DYSPNEA: ICD-10-CM

## 2023-08-07 LAB
2HR DELTA HS TROPONIN: -1 NG/L
4HR DELTA HS TROPONIN: -2 NG/L
ALBUMIN SERPL BCP-MCNC: 4.3 G/DL (ref 3.5–5)
ALP SERPL-CCNC: 84 U/L (ref 34–104)
ALT SERPL W P-5'-P-CCNC: 19 U/L (ref 7–52)
ANION GAP SERPL CALCULATED.3IONS-SCNC: 5 MMOL/L
APTT PPP: 28 SECONDS (ref 23–37)
AST SERPL W P-5'-P-CCNC: 21 U/L (ref 13–39)
ATRIAL RATE: 58 BPM
ATRIAL RATE: 65 BPM
BASOPHILS # BLD AUTO: 0.06 THOUSANDS/ÂΜL (ref 0–0.1)
BASOPHILS NFR BLD AUTO: 1 % (ref 0–1)
BILIRUB SERPL-MCNC: 0.37 MG/DL (ref 0.2–1)
BUN SERPL-MCNC: 22 MG/DL (ref 5–25)
CALCIUM SERPL-MCNC: 9.4 MG/DL (ref 8.4–10.2)
CARDIAC TROPONIN I PNL SERPL HS: 7 NG/L
CARDIAC TROPONIN I PNL SERPL HS: 8 NG/L
CARDIAC TROPONIN I PNL SERPL HS: 9 NG/L
CHLORIDE SERPL-SCNC: 107 MMOL/L (ref 96–108)
CO2 SERPL-SCNC: 26 MMOL/L (ref 21–32)
CREAT SERPL-MCNC: 0.75 MG/DL (ref 0.6–1.3)
EOSINOPHIL # BLD AUTO: 0.25 THOUSAND/ÂΜL (ref 0–0.61)
EOSINOPHIL NFR BLD AUTO: 3 % (ref 0–6)
ERYTHROCYTE [DISTWIDTH] IN BLOOD BY AUTOMATED COUNT: 12.1 % (ref 11.6–15.1)
GFR SERPL CREATININE-BSD FRML MDRD: 93 ML/MIN/1.73SQ M
GLUCOSE SERPL-MCNC: 161 MG/DL (ref 65–140)
HCT VFR BLD AUTO: 40.1 % (ref 34.8–46.1)
HGB BLD-MCNC: 13.5 G/DL (ref 11.5–15.4)
IMM GRANULOCYTES # BLD AUTO: 0.02 THOUSAND/UL (ref 0–0.2)
IMM GRANULOCYTES NFR BLD AUTO: 0 % (ref 0–2)
INR PPP: 0.93 (ref 0.84–1.19)
LYMPHOCYTES # BLD AUTO: 1.92 THOUSANDS/ÂΜL (ref 0.6–4.47)
LYMPHOCYTES NFR BLD AUTO: 25 % (ref 14–44)
MCH RBC QN AUTO: 30.3 PG (ref 26.8–34.3)
MCHC RBC AUTO-ENTMCNC: 33.7 G/DL (ref 31.4–37.4)
MCV RBC AUTO: 90 FL (ref 82–98)
MONOCYTES # BLD AUTO: 0.55 THOUSAND/ÂΜL (ref 0.17–1.22)
MONOCYTES NFR BLD AUTO: 7 % (ref 4–12)
NEUTROPHILS # BLD AUTO: 4.81 THOUSANDS/ÂΜL (ref 1.85–7.62)
NEUTS SEG NFR BLD AUTO: 64 % (ref 43–75)
NRBC BLD AUTO-RTO: 0 /100 WBCS
P AXIS: 55 DEGREES
P AXIS: 55 DEGREES
PLATELET # BLD AUTO: 203 THOUSANDS/UL (ref 149–390)
PMV BLD AUTO: 9.4 FL (ref 8.9–12.7)
POTASSIUM SERPL-SCNC: 4.4 MMOL/L (ref 3.5–5.3)
PR INTERVAL: 162 MS
PR INTERVAL: 178 MS
PROT SERPL-MCNC: 7.2 G/DL (ref 6.4–8.4)
PROTHROMBIN TIME: 12.3 SECONDS (ref 11.6–14.5)
QRS AXIS: 74 DEGREES
QRS AXIS: 75 DEGREES
QRSD INTERVAL: 150 MS
QRSD INTERVAL: 150 MS
QT INTERVAL: 490 MS
QT INTERVAL: 510 MS
QTC INTERVAL: 500 MS
QTC INTERVAL: 509 MS
RBC # BLD AUTO: 4.46 MILLION/UL (ref 3.81–5.12)
SODIUM SERPL-SCNC: 138 MMOL/L (ref 135–147)
T WAVE AXIS: 107 DEGREES
T WAVE AXIS: 109 DEGREES
VENTRICULAR RATE: 58 BPM
VENTRICULAR RATE: 65 BPM
WBC # BLD AUTO: 7.61 THOUSAND/UL (ref 4.31–10.16)

## 2023-08-07 PROCEDURE — 93971 EXTREMITY STUDY: CPT

## 2023-08-07 PROCEDURE — 93971 EXTREMITY STUDY: CPT | Performed by: SURGERY

## 2023-08-07 PROCEDURE — 71275 CT ANGIOGRAPHY CHEST: CPT

## 2023-08-07 PROCEDURE — G1004 CDSM NDSC: HCPCS

## 2023-08-07 PROCEDURE — 85025 COMPLETE CBC W/AUTO DIFF WBC: CPT | Performed by: PHYSICIAN ASSISTANT

## 2023-08-07 PROCEDURE — 93005 ELECTROCARDIOGRAM TRACING: CPT

## 2023-08-07 PROCEDURE — 36415 COLL VENOUS BLD VENIPUNCTURE: CPT | Performed by: PHYSICIAN ASSISTANT

## 2023-08-07 PROCEDURE — 99285 EMERGENCY DEPT VISIT HI MDM: CPT

## 2023-08-07 PROCEDURE — 84484 ASSAY OF TROPONIN QUANT: CPT | Performed by: PHYSICIAN ASSISTANT

## 2023-08-07 PROCEDURE — 99223 1ST HOSP IP/OBS HIGH 75: CPT | Performed by: INTERNAL MEDICINE

## 2023-08-07 PROCEDURE — 93010 ELECTROCARDIOGRAM REPORT: CPT | Performed by: INTERNAL MEDICINE

## 2023-08-07 PROCEDURE — 99406 BEHAV CHNG SMOKING 3-10 MIN: CPT | Performed by: INTERNAL MEDICINE

## 2023-08-07 PROCEDURE — 85610 PROTHROMBIN TIME: CPT | Performed by: PHYSICIAN ASSISTANT

## 2023-08-07 PROCEDURE — 85730 THROMBOPLASTIN TIME PARTIAL: CPT | Performed by: PHYSICIAN ASSISTANT

## 2023-08-07 PROCEDURE — 80053 COMPREHEN METABOLIC PANEL: CPT | Performed by: PHYSICIAN ASSISTANT

## 2023-08-07 RX ORDER — ZOLPIDEM TARTRATE 5 MG/1
10 TABLET ORAL
Status: DISCONTINUED | OUTPATIENT
Start: 2023-08-07 | End: 2023-08-09 | Stop reason: HOSPADM

## 2023-08-07 RX ORDER — ACETAMINOPHEN 325 MG/1
650 TABLET ORAL EVERY 6 HOURS PRN
Status: DISCONTINUED | OUTPATIENT
Start: 2023-08-07 | End: 2023-08-09 | Stop reason: HOSPADM

## 2023-08-07 RX ORDER — ONDANSETRON 2 MG/ML
4 INJECTION INTRAMUSCULAR; INTRAVENOUS EVERY 6 HOURS PRN
Status: DISCONTINUED | OUTPATIENT
Start: 2023-08-07 | End: 2023-08-07

## 2023-08-07 RX ORDER — ALPRAZOLAM 0.5 MG/1
0.5 TABLET ORAL 2 TIMES DAILY PRN
Status: DISCONTINUED | OUTPATIENT
Start: 2023-08-07 | End: 2023-08-09 | Stop reason: HOSPADM

## 2023-08-07 RX ORDER — RANOLAZINE 500 MG/1
500 TABLET, EXTENDED RELEASE ORAL 2 TIMES DAILY
Status: DISCONTINUED | OUTPATIENT
Start: 2023-08-07 | End: 2023-08-09 | Stop reason: HOSPADM

## 2023-08-07 RX ORDER — PANTOPRAZOLE SODIUM 20 MG/1
20 TABLET, DELAYED RELEASE ORAL
Status: DISCONTINUED | OUTPATIENT
Start: 2023-08-07 | End: 2023-08-09 | Stop reason: HOSPADM

## 2023-08-07 RX ORDER — AMLODIPINE BESYLATE 5 MG/1
5 TABLET ORAL DAILY
Status: DISCONTINUED | OUTPATIENT
Start: 2023-08-07 | End: 2023-08-09 | Stop reason: HOSPADM

## 2023-08-07 RX ORDER — ENOXAPARIN SODIUM 100 MG/ML
40 INJECTION SUBCUTANEOUS DAILY
Status: DISCONTINUED | OUTPATIENT
Start: 2023-08-07 | End: 2023-08-09 | Stop reason: HOSPADM

## 2023-08-07 RX ORDER — NICOTINE 21 MG/24HR
1 PATCH, TRANSDERMAL 24 HOURS TRANSDERMAL DAILY
Status: DISCONTINUED | OUTPATIENT
Start: 2023-08-07 | End: 2023-08-09 | Stop reason: HOSPADM

## 2023-08-07 RX ORDER — PAROXETINE HYDROCHLORIDE 20 MG/1
40 TABLET, FILM COATED ORAL EVERY MORNING
Status: DISCONTINUED | OUTPATIENT
Start: 2023-08-08 | End: 2023-08-09 | Stop reason: HOSPADM

## 2023-08-07 RX ORDER — ATORVASTATIN CALCIUM 20 MG/1
20 TABLET, FILM COATED ORAL DAILY
Status: DISCONTINUED | OUTPATIENT
Start: 2023-08-07 | End: 2023-08-09 | Stop reason: HOSPADM

## 2023-08-07 RX ORDER — BUPROPION HYDROCHLORIDE 150 MG/1
150 TABLET ORAL EVERY MORNING
Status: DISCONTINUED | OUTPATIENT
Start: 2023-08-08 | End: 2023-08-09 | Stop reason: HOSPADM

## 2023-08-07 RX ADMIN — ALPRAZOLAM 0.5 MG: 0.5 TABLET ORAL at 22:04

## 2023-08-07 RX ADMIN — ENOXAPARIN SODIUM 40 MG: 40 INJECTION SUBCUTANEOUS at 17:32

## 2023-08-07 RX ADMIN — ZOLPIDEM TARTRATE 10 MG: 5 TABLET, COATED ORAL at 22:02

## 2023-08-07 RX ADMIN — PANTOPRAZOLE SODIUM 20 MG: 20 TABLET, DELAYED RELEASE ORAL at 22:02

## 2023-08-07 RX ADMIN — IOHEXOL 85 ML: 350 INJECTION, SOLUTION INTRAVENOUS at 11:45

## 2023-08-07 RX ADMIN — RANOLAZINE 500 MG: 500 TABLET, EXTENDED RELEASE ORAL at 22:02

## 2023-08-07 RX ADMIN — SACUBITRIL AND VALSARTAN 1 TABLET: 49; 51 TABLET, FILM COATED ORAL at 22:04

## 2023-08-07 RX ADMIN — METOPROLOL TARTRATE 25 MG: 25 TABLET, FILM COATED ORAL at 22:02

## 2023-08-07 NOTE — ED PROVIDER NOTES
History  Chief Complaint   Patient presents with   • Leg Swelling     Leg swelling, sob, reports chest pain yesterday      Mike Neri is a 46year-old female with PMHx including microvascular angina, LBBB, tobacco use, bipolar disorder presenting to the ED with complaint of chest pain & shortness of breath. She states that she has been experiencing right calf pain/swelling x several weeks, which has been atraumatic in onset. She additionally reports increasingly worsening exertional dyspnea over the past few weeks as well. On my assessment, the patient pauses in conversation to catch her breath, noting that more recently it takes minimal activity for her to become symptomatic. She reports acute onset of chest pain radiating across her chest last night that has been waxing and waning from time of onset. She does not find that her chest discomfort is brought upon by deep breathing or exertion, noting that symptoms occur more often at rest.  She additionally reports that she has been experiencing episodes of diaphoresis from time to time as well. She denies any near syncope or syncopal event. She denies any congestion, cough, fevers or chills, recent illness or sick contact. She denies recent travel, recent surgical procedures, personal or family history of VTE. She offers no other complaints or concerns at this time. Prior to Admission Medications   Prescriptions Last Dose Informant Patient Reported? Taking?    ALPRAZolam (XANAX) 0.5 mg tablet   No No   Sig: Take 1 tablet (0.5 mg total) by mouth 2 (two) times a day as needed for anxiety   Flowflex COVID-19 Ag Home Test KIT   Yes No   Patient not taking: Reported on 7/12/2023   PARoxetine (PAXIL) 40 MG tablet  Self No No   Sig: Take 1 tablet (40 mg total) by mouth every morning   Probiotic Product (Align) 4 MG CAPS   No No   Sig: Take 1 capsule (4 mg total) by mouth in the morning   Patient not taking: Reported on 7/12/2023   amLODIPine (NORVASC) 5 mg tablet  Self No No   Sig: Take 1 tablet (5 mg total) by mouth daily   aspirin (ECOTRIN LOW STRENGTH) 81 mg EC tablet   No No   Sig: Take 1 tablet (81 mg total) by mouth daily   atorvastatin (LIPITOR) 20 mg tablet  Self No No   Sig: Take 1 tablet (20 mg total) by mouth daily   buPROPion (WELLBUTRIN XL) 150 mg 24 hr tablet  Self No No   Sig: Take 1 tablet (150 mg total) by mouth every morning   clotrimazole-betamethasone (LOTRISONE) 1-0.05 % cream   No No   Sig: Apply topically 2 (two) times a day   erythromycin (ILOTYCIN) ophthalmic ointment   No No   Sig: Administer 0.5 inches to both eyes daily at bedtime   esomeprazole (NexIUM) 40 MG capsule   No No   Sig: Take 1 capsule (40 mg total) by mouth 2 (two) times a day before meals   fluconazole (DIFLUCAN) 150 mg tablet   Yes No   Sig: take 1 tablet by mouth AS A ONE TIME DOSE   Patient not taking: Reported on 2023   metoprolol tartrate (LOPRESSOR) 25 mg tablet   No No   Sig: Take 1 tablet (25 mg total) by mouth every 12 (twelve) hours   psyllium (METAMUCIL) 0.52 g capsule   No No   Si capsules BID   Patient not taking: Reported on 2023   ranolazine (RANEXA) 500 mg 12 hr tablet   No No   Sig: take 1 tablet by mouth twice a day   sacubitril-valsartan (ENTRESTO) 49-51 MG TABS   No No   Sig: Take 1 tablet by mouth 2 (two) times a day   zolpidem (AMBIEN) 10 mg tablet  Self No No   Sig: Take 1 tablet (10 mg total) by mouth daily at bedtime as needed for sleep      Facility-Administered Medications: None       Past Medical History:   Diagnosis Date   • Anxiety    • Bipolar 1 disorder ([address]) 2020   • Bipolar 1 disorder ([address]) 2020   • Bipolar disease, manic ([address])    • Depression    • Gastroesophageal reflux disease without esophagitis 2020   • Schizophrenia [facility])        Past Surgical History:   Procedure Laterality Date   • CARDIAC CATHETERIZATION N/A 10/24/2022    Procedure: Cardiac Coronary Angiogram;  Surgeon: Miracle Beasley MD;  Location: [facility] CARDIAC CATH LAB; Service: Cardiology   •  SECTION      x2   • HYSTERECTOMY     • KNEE SURGERY Left     4 times   • LEG SURGERY Right     thony placed then removed, broken femur   • NEUROPLASTY / TRANSPOSITION MEDIAN NERVE AT CARPAL TUNNEL         Family History   Problem Relation Age of Onset   • Heart attack Mother    • Heart disease Mother    • Heart failure Father    • COPD Father    • Liver cancer Sister    • Colon cancer Sister 59   • Kidney cancer Sister    • Breast cancer Maternal Grandmother    • Heart disease Brother    • Colon cancer Brother 79   • Liver cancer Brother    • Heart attack Maternal Uncle      I have reviewed and agree with the history as documented. E-Cigarette/Vaping   • E-Cigarette Use Never User      E-Cigarette/Vaping Substances   • Nicotine No    • THC No    • CBD No    • Flavoring No    • Other No    • Unknown No      Social History     Tobacco Use   • Smoking status: Every Day     Packs/day: 0.25     Types: Cigarettes   • Smokeless tobacco: Never   Vaping Use   • Vaping Use: Never used   Substance Use Topics   • Alcohol use: Not Currently   • Drug use: Not Currently     Types: Methamphetamines       Review of Systems   Constitutional: Negative for chills and fever. Respiratory: Positive for shortness of breath. Cardiovascular: Positive for chest pain. Gastrointestinal: Negative for nausea and vomiting. Musculoskeletal: Negative for back pain. Neurological: Negative for dizziness and syncope. All other systems reviewed and are negative. Physical Exam  Physical Exam  Vitals and nursing note reviewed. Constitutional:       General: She is not in acute distress. Appearance: Normal appearance. She is well-developed. She is not ill-appearing, toxic-appearing or diaphoretic. HENT:      Head: Normocephalic and atraumatic.       Right Ear: External ear normal.      Left Ear: External ear normal.   Eyes:      Conjunctiva/sclera: Conjunctivae normal. Cardiovascular:      Rate and Rhythm: Normal rate and regular rhythm. Pulses: Normal pulses. Pulmonary:      Effort: Tachypnea present. No respiratory distress. Breath sounds: Normal breath sounds. No wheezing, rhonchi or rales. Chest:      Chest wall: No tenderness. Abdominal:      General: There is no distension. Palpations: Abdomen is soft. Tenderness: There is no abdominal tenderness. Musculoskeletal:         General: Normal range of motion. Cervical back: Normal range of motion and neck supple. Skin:     General: Skin is warm and dry. Capillary Refill: Capillary refill takes less than 2 seconds. Neurological:      Mental Status: She is alert. Motor: Motor function is intact. No weakness. Gait: Gait is intact.    Psychiatric:         Mood and Affect: Mood normal.         Vital Signs  ED Triage Vitals [08/07/23 1008]   Temperature Pulse Respirations Blood Pressure SpO2   98 °F (36.7 °C) 68 (!) 24 148/77 97 %      Temp src Heart Rate Source Patient Position - Orthostatic VS BP Location FiO2 (%)   -- -- -- -- --      Pain Score       --           Vitals:    08/07/23 1008   BP: 148/77   Pulse: 68         Visual Acuity      ED Medications  Medications   iohexol (OMNIPAQUE) 350 MG/ML injection (MULTI-DOSE) 85 mL (85 mL Intravenous Given 8/7/23 1145)       Diagnostic Studies  Results Reviewed     Procedure Component Value Units Date/Time    HS Troponin I 2hr [[id number]]  (Normal) Collected: 08/07/23 1402    Lab Status: Final result Specimen: Blood from Arm, Left Updated: 08/07/23 1431     hs TnI 2hr 8 ng/L      Delta 2hr hsTnI -1 ng/L     HS Troponin I 4hr [[id number]]     Lab Status: No result Specimen: Blood     HS Troponin 0hr (reflex protocol) [[id number]]  (Normal) Collected: 08/07/23 1039    Lab Status: Final result Specimen: Blood from Arm, Left Updated: 08/07/23 1111     hs TnI 0hr 9 ng/L     Comprehensive metabolic panel [[id number]]  (Abnormal) Collected: 08/07/23 1039    Lab Status: Final result Specimen: Blood from Arm, Left Updated: 08/07/23 1103     Sodium 138 mmol/L      Potassium 4.4 mmol/L      Chloride 107 mmol/L      CO2 26 mmol/L      ANION GAP 5 mmol/L      BUN 22 mg/dL      Creatinine 0.75 mg/dL      Glucose 161 mg/dL      Calcium 9.4 mg/dL      AST 21 U/L      ALT 19 U/L      Alkaline Phosphatase 84 U/L      Total Protein 7.2 g/dL      Albumin 4.3 g/dL      Total Bilirubin 0.37 mg/dL      eGFR 93 ml/min/1.73sq m     Narrative:      National Kidney Disease Foundation guidelines for Chronic Kidney Disease (CKD):   •  Stage 1 with normal or high GFR (GFR > 90 mL/min/1.73 square meters)  •  Stage 2 Mild CKD (GFR = 60-89 mL/min/1.73 square meters)  •  Stage 3A Moderate CKD (GFR = 45-59 mL/min/1.73 square meters)  •  Stage 3B Moderate CKD (GFR = 30-44 mL/min/1.73 square meters)  •  Stage 4 Severe CKD (GFR = 15-29 mL/min/1.73 square meters)  •  Stage 5 End Stage CKD (GFR <15 mL/min/1.73 square meters)  Note: GFR calculation is accurate only with a steady state creatinine    Protime-INR [[id number]]  (Normal) Collected: 08/07/23 1039    Lab Status: Final result Specimen: Blood from Arm, Left Updated: 08/07/23 1100     Protime 12.3 seconds      INR 0.93    APTT [[id number]]  (Normal) Collected: 08/07/23 1039    Lab Status: Final result Specimen: Blood from Arm, Left Updated: 08/07/23 1100     PTT 28 seconds     CBC and differential [[id number]] Collected: 08/07/23 1039    Lab Status: Final result Specimen: Blood from Arm, Left Updated: 08/07/23 1045     WBC 7.61 Thousand/uL      RBC 4.46 Million/uL      Hemoglobin 13.5 g/dL      Hematocrit 40.1 %      MCV 90 fL      MCH 30.3 pg      MCHC 33.7 g/dL      RDW 12.1 %      MPV 9.4 fL      Platelets 863 Thousands/uL      nRBC 0 /100 WBCs      Neutrophils Relative 64 %      Immat GRANS % 0 %      Lymphocytes Relative 25 %      Monocytes Relative 7 %      Eosinophils Relative 3 %      Basophils Relative 1 %      Neutrophils Absolute 4.81 Thousands/µL      Immature Grans Absolute 0.02 Thousand/uL      Lymphocytes Absolute 1.92 Thousands/µL      Monocytes Absolute 0.55 Thousand/µL      Eosinophils Absolute 0.25 Thousand/µL      Basophils Absolute 0.06 Thousands/µL                  CTA ED chest PE Study   Final Result by Sunni Hammond MD (08/07 1311)      No evidence of pulmonary embolus. Workstation performed: [id number]         VAS lower limb venous duplex study, unilateral/limited   Final Result by Cortney Zhou MD (08/07 1968)                 Procedures  ECG 12 Lead Documentation Only    Date/Time: 8/7/2023 10:17 AM    Performed by: Eun Dunaway PA-C  Authorized by: Eun Dunaway PA-C    Indications / Diagnosis:  Chest pain  ECG reviewed by me, the ED Provider: yes    Interpretation:     Interpretation: abnormal    Rate:     ECG rate:  65    ECG rate assessment: normal    Rhythm:     Rhythm: sinus rhythm    Ectopy:     Ectopy: none    QRS:     QRS axis:  Normal  Conduction:     Conduction: abnormal      Abnormal conduction comment:  LBBB  Comments:      No ischemic ST/T wave changes             ED Course                               SBIRT 20yo+    Flowsheet Row Most Recent Value   Initial Alcohol Screen: US AUDIT-C     1. How often do you have a drink containing alcohol? 0 Filed at: 08/07/2023 1009   2. How many drinks containing alcohol do you have on a typical day you are drinking? 0 Filed at: 08/07/2023 1009   3a. Male UNDER 65: How often do you have five or more drinks on one occasion? 0 Filed at: 08/07/2023 1009   3b. FEMALE Any Age, or MALE 65+: How often do you have 4 or more drinks on one occassion? 0 Filed at: 08/07/2023 1009   Audit-C Score 0 Filed at: 08/07/2023 1009   CHARISMA: How many times in the past year have you. .. Used an illegal drug or used a prescription medication for non-medical reasons?  Never Filed at: 08/07/2023 1009                    Medical Decision Making  This is a 57-year-old female presenting for evaluation with complaint of chest pain and shortness of breath. Reports she has been experiencing right calf pain and swelling for several weeks, also finding that she has been experiencing worsening exertional dyspnea over that same timeframe. She developed diffuse chest pain last night as well, stating that her episodes of chest pain have been waxing and waning from time of onset. Differential diagnosis includes but is not limited to: ACS/anginal equivalent, arrhythmia, DVT/PE, pneumothorax, cardiomyopathy, congestive heart failure, pneumonia, bronchitis, lymphedema, Baker's cyst, muscle spasm    Initial ED plan: ECG, labs, CTA chest and duplex    Final ED Assessment: Vital signs reviewed on ED presentation, examination as above. All labs and imaging independently reviewed with imaging interpreted by the Radiologist.  ECG without ischemic change, troponin within normal limits. Duplex negative for DVT and CTA chest also reports no evidence of pulmonary embolus. The patient has continued to be symptomatic throughout ED course. Discussed with Dr. Angelique Adam, Cardiology. We will proceed with admission for observation and stress test during hospital course. Test results reviewed at bedside with the patient as well as plan to proceed with hospital admission which she is understanding of and agreeable with. Case discussed with Dr. Manjinder Pulliam slim, who accepts patient for admission to the internal medicine service for continued care and further management. Patient hemodynamically stable at time of admission, bridging orders placed. Chest pain: acute illness or injury  Amount and/or Complexity of Data Reviewed  Labs: ordered. Radiology: ordered. ECG/medicine tests: ordered. Risk  Prescription drug management. Decision regarding hospitalization.           Disposition  Final diagnoses:   Chest pain   Dyspnea     Time reflects when diagnosis was documented in both MDM as applicable and the Disposition within this note     Time User Action Codes Description Comment    8/7/2023  2:55 PM Drdayron Lover Add [R07.9] Chest pain     8/7/2023  2:56 PM Druscfestus Lover Add [R06.00] Dyspnea     8/9/2023  9:16 AM Scottie Quintero Add [R21] Rash       ED Disposition     ED Disposition   Admit    Condition   Stable    Date/Time   Mon Aug 7, 2023  2:55 PM    Comment   Case was discussed with Dr. Imelda Serrano and the patient's admission status was agreed to be Admission Status: observation status to the service of Dr. Imelda Serrano . Follow-up Information    None         Patient's Medications   Discharge Prescriptions    No medications on file       No discharge procedures on file.     PDMP Review       Value Time User    PDMP Reviewed  Yes 10/12/2022  1:49 AM Kenney Kimbrough MD          ED Provider  Electronically Signed by           Carmelo Green PA-C  08/12/23 7735

## 2023-08-07 NOTE — PLAN OF CARE
Problem: CARDIOVASCULAR - ADULT  Goal: Maintains optimal cardiac output and hemodynamic stability  Description: INTERVENTIONS:  - Monitor I/O, vital signs and rhythm  - Monitor for S/S and trends of decreased cardiac output  - Administer and titrate ordered vasoactive medications to optimize hemodynamic stability  - Assess quality of pulses, skin color and temperature  - Assess for signs of decreased coronary artery perfusion  - Instruct patient to report change in severity of symptoms  Outcome: Progressing     Problem: PAIN - ADULT  Goal: Verbalizes/displays adequate comfort level or baseline comfort level  Description: Interventions:  - Encourage patient to monitor pain and request assistance  - Assess pain using appropriate pain scale  - Administer analgesics based on type and severity of pain and evaluate response  - Implement non-pharmacological measures as appropriate and evaluate response  - Consider cultural and social influences on pain and pain management  - Notify physician/advanced practitioner if interventions unsuccessful or patient reports new pain  Outcome: Progressing

## 2023-08-07 NOTE — ASSESSMENT & PLAN NOTE
· Stable  · Continue bupropion, alprazolam Alvarez Peña), Cardiovascular Disease; Critical Care Medicine; Internal Medicine  [address] [place] [address]  Phone: [phone]  Fax: [phone]

## 2023-08-07 NOTE — ASSESSMENT & PLAN NOTE
· 52yo F who follows with Dr Radhika Shine presented with episodic chest pain and SOB.   · CTA negative for PE  · EKG with LBBB, chronic  · First troponin negative  · TIMI1. ED discussed with cardiology who recommends observation    · Cardiology consult  · Stress test diet in case they want to stress her  · Trend troponins  · Telemetry monitoring

## 2023-08-07 NOTE — H&P
[address]  H&P  Name: Rosemarie Bernabe 46 y.o. female I MRN: [id number]  Unit/Bed#: -01 I Date of Admission: 8/7/2023   Date of Service: 8/7/2023 I Hospital Day: 0      Assessment/Plan   * Chest pain  Assessment & Plan  · 52yo F who follows with Dr Kathleen Doll presented with episodic chest pain and SOB. · CTA negative for PE  · EKG with LBBB, chronic  · First troponin negative  · TIMI1. ED discussed with cardiology who recommends observation    · Cardiology consult  · Stress test diet in case they want to stress her  · Trend troponins  · Telemetry monitoring    Rash  Assessment & Plan  · Patient has bilateral lower extremity swelling and a red-dotted/macular nonblanchable rash which she states has been getting worse, associated with edema. Also mentions joint stiffness. She does not appear septic. · Will check ESR, CRP, NILS, RF. If it continues to spread may need follow up with dermatology for bx. Tobacco abuse  Assessment & Plan  · Smoking cessation discussed and recommended >3m  · NRT to be provided while in the hospital    Essential hypertension  Assessment & Plan  · Continue amlodipine, metoprolol  · Monitor BP    Bipolar 1 disorder (HCC)  Assessment & Plan  · Stable  · Continue bupropion, alprazolam           VTE Prophylaxis: Enoxaparin (Lovenox)  / sequential compression device   Code Status: FC  POLST: POLST form is not discussed and not completed at this time. Discussion with family: Patient    Anticipated Length of Stay:  Patient will be admitted on an Observation basis with an anticipated length of stay of  Less than 2 midnights. Justification for Hospital Stay: chest pain    Total Time for Visit, including Counseling / Coordination of Care: 90 minutes. Greater than 50% of this total time spent on direct patient counseling and coordination of care.     Chief Complaint:   Chest pain    History of Present Illness:    Rosemarie Bernabe is a 46 y.o. female who presents with shortness of breath and chest pain. Patient does mention some exertional dyspnea and episodic chest discomfort described as tightness. She denies any persistent chest pain although she does mention that these episodes occur on occasion. She denies any presyncope or syncope. She states the episodes have been happening more frequently. She follows with cardiologist Dr. Concepcion Bray and was planned for a stress test in the outpatient setting but ED has discussed with cardiology and the plan to potentially do that in the inpatient setting now. A second main complaint that she has is lower extremity swelling, she does mention a rash composed primarily by red dots in the lower extremities and some episodic swelling and joint stiffness. She denies any fever. A chest CTA did not reveal any PE and a venous duplex did not reveal any DVT    Review of Systems:    Review of Systems   Constitutional: Positive for fatigue. Respiratory: Positive for chest tightness and shortness of breath. Skin: Positive for rash. All other systems reviewed and are negative. Past Medical and Surgical History:     Past Medical History:   Diagnosis Date   • Anxiety    • Bipolar 1 disorder ([facility]) 2020   • Bipolar 1 disorder ([address]) 2020   • Bipolar disease, manic ([address] )    • Depression    • Gastroesophageal reflux disease without esophagitis 2020   • Schizophrenia [facility])        Past Surgical History:   Procedure Laterality Date   • CARDIAC CATHETERIZATION N/A 10/24/2022    Procedure: Cardiac Coronary Angiogram;  Surgeon: Nelly Chicas MD;  Location: [address] [place] [address] CATH LAB; Service: Cardiology   •  SECTION      x2   • HYSTERECTOMY     • KNEE SURGERY Left     4 times   • LEG SURGERY Right     thony placed then removed, broken femur   • NEUROPLASTY / TRANSPOSITION MEDIAN NERVE AT CARPAL TUNNEL         Meds/Allergies:    Prior to Admission medications    Medication Sig Start Date End Date Taking?  Authorizing Provider ALPRAZolam (XANAX) 0.5 mg tablet Take 1 tablet (0.5 mg total) by mouth 2 (two) times a day as needed for anxiety 6/23/23   Jackie Chapa MD   amLODIPine (NORVASC) 5 mg tablet Take 1 tablet (5 mg total) by mouth daily 4/6/23   Jackie Chapa MD   aspirin (ECOTRIN LOW STRENGTH) 81 mg EC tablet Take 1 tablet (81 mg total) by mouth daily 6/23/23   Jackie Chapa MD   atorvastatin (LIPITOR) 20 mg tablet Take 1 tablet (20 mg total) by mouth daily 11/9/22   Tye Nageotte, MD   buPROPion (WELLBUTRIN XL) 150 mg 24 hr tablet Take 1 tablet (150 mg total) by mouth every morning 4/6/23 3/31/24  Jackie Chapa MD   clotrimazole-betamethasone (LOTRISONE) 1-0.05 % cream Apply topically 2 (two) times a day 8/4/23   Jackie Chapa MD   erythromycin (ILOTYCIN) ophthalmic ointment Administer 0.5 inches to both eyes daily at bedtime 8/4/23   Jackie Chapa MD   esomeprazole (NexIUM) 40 MG capsule Take 1 capsule (40 mg total) by mouth 2 (two) times a day before meals 6/23/23   Jackie Chapa MD   Flowflex COVID-19 Ag Home Test KIT  4/8/23   Historical Provider, MD   fluconazole (DIFLUCAN) 150 mg tablet take 1 tablet by mouth AS A ONE TIME DOSE  Patient not taking: Reported on 7/12/2023 4/13/23   Historical Provider, MD   metoprolol tartrate (LOPRESSOR) 25 mg tablet Take 1 tablet (25 mg total) by mouth every 12 (twelve) hours 7/20/23   Micheal Freitas MD   PARoxetine (PAXIL) 40 MG tablet Take 1 tablet (40 mg total) by mouth every morning 2/20/23   Jackie Chapa MD   Probiotic Product (Align) 4 MG CAPS Take 1 capsule (4 mg total) by mouth in the morning  Patient not taking: Reported on 7/12/2023 5/19/23   Bert Barker PA-C   psyllium (METAMUCIL) 0.52 g capsule 2 capsules BID  Patient not taking: Reported on 7/12/2023 5/19/23   Bert Barker PA-C   ranolazine (RANEXA) 500 mg 12 hr tablet take 1 tablet by mouth twice a day 6/22/23   Micheal Freitas MD   sacubitril-valsartan (ENTRESTO) 49-51 MG TABS Take 1 tablet by mouth 2 (two) times a day 7/12/23   Miracle Beasley MD   zolpidem (AMBIEN) 10 mg tablet Take 1 tablet (10 mg total) by mouth daily at bedtime as needed for sleep 4/6/23   Rich Castillo MD     I have reviewed home medications with patient personally. Allergies: Allergies   Allergen Reactions   • Ciprofloxacin Hives   • Wound Dressing Adhesive Other (See Comments)     unknown   • Atarax [Hydroxyzine] Palpitations       Social History:     Marital Status: /Civil Union     Substance Use History:   Social History     Substance and Sexual Activity   Alcohol Use Not Currently     Social History     Tobacco Use   Smoking Status Every Day   • Packs/day: 0.25   • Types: Cigarettes   Smokeless Tobacco Never     Social History     Substance and Sexual Activity   Drug Use Not Currently   • Types: Methamphetamines       Family History:    non-contributory    Physical Exam:     Vitals:   Blood Pressure: 135/70 (08/07/23 1510)  Pulse: 62 (08/07/23 1510)  Temperature: 98 °F (36.7 °C) (08/07/23 1008)  Respirations: 22 (08/07/23 1510)  SpO2: 96 % (08/07/23 1510)    Physical Exam  Vitals and nursing note reviewed. Constitutional:       General: She is not in acute distress. Appearance: Normal appearance. She is normal weight. She is not ill-appearing, toxic-appearing or diaphoretic. HENT:      Head: Normocephalic and atraumatic. Right Ear: External ear normal.      Left Ear: External ear normal.      Nose: Nose normal. No congestion. Mouth/Throat:      Mouth: Mucous membranes are moist.      Pharynx: Oropharynx is clear. No oropharyngeal exudate or posterior oropharyngeal erythema. Eyes:      General: No scleral icterus. Right eye: No discharge. Left eye: No discharge. Extraocular Movements: Extraocular movements intact. Conjunctiva/sclera: Conjunctivae normal.      Pupils: Pupils are equal, round, and reactive to light.    Cardiovascular:      Rate and Rhythm: Normal rate and regular rhythm. Pulses: Normal pulses. Heart sounds: Normal heart sounds. No murmur heard. No friction rub. No gallop. Pulmonary:      Effort: Pulmonary effort is normal. No respiratory distress. Breath sounds: Normal breath sounds. No stridor. No wheezing, rhonchi or rales. Chest:      Chest wall: No tenderness. Abdominal:      General: Abdomen is flat. Bowel sounds are normal. There is no distension. Palpations: Abdomen is soft. There is no mass. Tenderness: There is no abdominal tenderness. There is no guarding or rebound. Musculoskeletal:         General: No swelling, tenderness, deformity or signs of injury. Normal range of motion. Cervical back: Normal range of motion and neck supple. No rigidity. No muscular tenderness. Right lower leg: Edema present. Left lower leg: Edema present. Skin:     General: Skin is warm and dry. Capillary Refill: Capillary refill takes less than 2 seconds. Coloration: Skin is not jaundiced or pale. Findings: Rash present. No bruising, erythema or lesion. Comments: In the bilateral lower extremities but R>L there is a rash composed primarily by small circumferential bright red macules, nonblanchable to palpation  There is also some associated edema with it   Neurological:      General: No focal deficit present. Mental Status: She is alert and oriented to person, place, and time. Mental status is at baseline. Cranial Nerves: No cranial nerve deficit. Sensory: No sensory deficit. Motor: No weakness. Coordination: Coordination normal.   Psychiatric:         Mood and Affect: Mood normal.         Behavior: Behavior normal.         Thought Content: Thought content normal.         Judgment: Judgment normal.           Additional Data:     Lab Results: I have personally reviewed pertinent reports.       Results from last 7 days   Lab Units 08/07/23  1039   WBC Thousand/uL 7.61   HEMOGLOBIN g/dL 13.5   HEMATOCRIT % 40.1   PLATELETS Thousands/uL 203   NEUTROS PCT % 64   LYMPHS PCT % 25   MONOS PCT % 7   EOS PCT % 3     Results from last 7 days   Lab Units 08/07/23  1039   SODIUM mmol/L 138   POTASSIUM mmol/L 4.4   CHLORIDE mmol/L 107   CO2 mmol/L 26   BUN mg/dL 22   CREATININE mg/dL 0.75   ANION GAP mmol/L 5   CALCIUM mg/dL 9.4   ALBUMIN g/dL 4.3   TOTAL BILIRUBIN mg/dL 0.37   ALK PHOS U/L 84   ALT U/L 19   AST U/L 21   GLUCOSE RANDOM mg/dL 161*     Results from last 7 days   Lab Units 08/07/23  1039   INR  0.93                   Imaging: I have personally reviewed pertinent reports. CTA ED chest PE Study   Final Result by Anton Recinos MD (08/07 1311)      No evidence of pulmonary embolus. Workstation performed: [id number]         VAS lower limb venous duplex study, unilateral/limited   Final Result by Estella Meneses MD (08/07 5898)              [facility] Records Reviewed: Yes     ** Please Note: This note has been constructed using a voice recognition system.  **

## 2023-08-07 NOTE — ASSESSMENT & PLAN NOTE
· Patient has bilateral lower extremity swelling and a red-dotted/macular nonblanchable rash which she states has been getting worse, associated with edema. Also mentions joint stiffness. She does not appear septic. · Will check ESR, CRP, NILS, RF. If it continues to spread may need follow up with dermatology for bx.

## 2023-08-08 ENCOUNTER — APPOINTMENT (OUTPATIENT)
Dept: NON INVASIVE DIAGNOSTICS | Facility: HOSPITAL | Age: 48
End: 2023-08-08
Payer: COMMERCIAL

## 2023-08-08 ENCOUNTER — APPOINTMENT (OUTPATIENT)
Dept: NUCLEAR MEDICINE | Facility: HOSPITAL | Age: 48
End: 2023-08-08
Payer: COMMERCIAL

## 2023-08-08 LAB
ALBUMIN SERPL BCP-MCNC: 4 G/DL (ref 3.5–5)
ALP SERPL-CCNC: 63 U/L (ref 34–104)
ALT SERPL W P-5'-P-CCNC: 20 U/L (ref 7–52)
ANION GAP SERPL CALCULATED.3IONS-SCNC: 4 MMOL/L
ARRHY DURING EX: NORMAL
AST SERPL W P-5'-P-CCNC: 17 U/L (ref 13–39)
BASOPHILS # BLD AUTO: 0.03 THOUSANDS/ÂΜL (ref 0–0.1)
BASOPHILS NFR BLD AUTO: 1 % (ref 0–1)
BILIRUB SERPL-MCNC: 0.34 MG/DL (ref 0.2–1)
BNP SERPL-MCNC: 17 PG/ML (ref 0–100)
BUN SERPL-MCNC: 18 MG/DL (ref 5–25)
CALCIUM SERPL-MCNC: 9.3 MG/DL (ref 8.4–10.2)
CHEST PAIN STATEMENT: NORMAL
CHLORIDE SERPL-SCNC: 107 MMOL/L (ref 96–108)
CO2 SERPL-SCNC: 27 MMOL/L (ref 21–32)
CREAT SERPL-MCNC: 0.67 MG/DL (ref 0.6–1.3)
CRP SERPL QL: 4 MG/L
EOSINOPHIL # BLD AUTO: 0.2 THOUSAND/ÂΜL (ref 0–0.61)
EOSINOPHIL NFR BLD AUTO: 4 % (ref 0–6)
ERYTHROCYTE [DISTWIDTH] IN BLOOD BY AUTOMATED COUNT: 12 % (ref 11.6–15.1)
ERYTHROCYTE [SEDIMENTATION RATE] IN BLOOD: 11 MM/HOUR (ref 0–29)
GFR SERPL CREATININE-BSD FRML MDRD: 102 ML/MIN/1.73SQ M
GLUCOSE SERPL-MCNC: 145 MG/DL (ref 65–140)
HCT VFR BLD AUTO: 40.1 % (ref 34.8–46.1)
HGB BLD-MCNC: 13.3 G/DL (ref 11.5–15.4)
IMM GRANULOCYTES # BLD AUTO: 0.02 THOUSAND/UL (ref 0–0.2)
IMM GRANULOCYTES NFR BLD AUTO: 0 % (ref 0–2)
LYMPHOCYTES # BLD AUTO: 1.61 THOUSANDS/ÂΜL (ref 0.6–4.47)
LYMPHOCYTES NFR BLD AUTO: 29 % (ref 14–44)
MAX DIASTOLIC BP: 71 MMHG
MAX HEART RATE: 88 BPM
MAX HR: 88 BPM
MAX PREDICTED HEART RATE: 170 BPM
MAX. SYSTOLIC BP: 149 MMHG
MCH RBC QN AUTO: 29.8 PG (ref 26.8–34.3)
MCHC RBC AUTO-ENTMCNC: 33.2 G/DL (ref 31.4–37.4)
MCV RBC AUTO: 90 FL (ref 82–98)
MONOCYTES # BLD AUTO: 0.58 THOUSAND/ÂΜL (ref 0.17–1.22)
MONOCYTES NFR BLD AUTO: 11 % (ref 4–12)
NEUTROPHILS # BLD AUTO: 3.1 THOUSANDS/ÂΜL (ref 1.85–7.62)
NEUTS SEG NFR BLD AUTO: 55 % (ref 43–75)
NRBC BLD AUTO-RTO: 0 /100 WBCS
PLATELET # BLD AUTO: 189 THOUSANDS/UL (ref 149–390)
PMV BLD AUTO: 9.6 FL (ref 8.9–12.7)
POTASSIUM SERPL-SCNC: 4.5 MMOL/L (ref 3.5–5.3)
PROT SERPL-MCNC: 6.8 G/DL (ref 6.4–8.4)
PROTOCOL NAME: NORMAL
RATE PRESSURE PRODUCT: NORMAL
RBC # BLD AUTO: 4.47 MILLION/UL (ref 3.81–5.12)
REASON FOR TERMINATION: NORMAL
RHEUMATOID FACT SER QL LA: NEGATIVE
SL CV REST NUCLEAR ISOTOPE DOSE: 11 MCI
SL CV STRESS NUCLEAR ISOTOPE DOSE: 32.7 MCI
SL CV STRESS RECOVERY BP: NORMAL MMHG
SL CV STRESS RECOVERY HR: 67 BPM
SODIUM SERPL-SCNC: 138 MMOL/L (ref 135–147)
STRESS BASELINE BP: NORMAL MMHG
STRESS BASELINE HR: 59 BPM
STRESS O2 SAT REST: 100 %
STRESS PEAK HR: 88 BPM
STRESS POST O2 SAT PEAK: 99 %
STRESS POST PEAK BP: 146 MMHG
TARGET HR FORMULA: NORMAL
TEST INDICATION: NORMAL
TIME IN EXERCISE PHASE: NORMAL
WBC # BLD AUTO: 5.54 THOUSAND/UL (ref 4.31–10.16)

## 2023-08-08 PROCEDURE — 93017 CV STRESS TEST TRACING ONLY: CPT

## 2023-08-08 PROCEDURE — 86430 RHEUMATOID FACTOR TEST QUAL: CPT | Performed by: INTERNAL MEDICINE

## 2023-08-08 PROCEDURE — 78452 HT MUSCLE IMAGE SPECT MULT: CPT

## 2023-08-08 PROCEDURE — 86038 ANTINUCLEAR ANTIBODIES: CPT | Performed by: INTERNAL MEDICINE

## 2023-08-08 PROCEDURE — 99232 SBSQ HOSP IP/OBS MODERATE 35: CPT | Performed by: FAMILY MEDICINE

## 2023-08-08 PROCEDURE — 86140 C-REACTIVE PROTEIN: CPT | Performed by: INTERNAL MEDICINE

## 2023-08-08 PROCEDURE — 80053 COMPREHEN METABOLIC PANEL: CPT | Performed by: INTERNAL MEDICINE

## 2023-08-08 PROCEDURE — 93018 CV STRESS TEST I&R ONLY: CPT | Performed by: INTERNAL MEDICINE

## 2023-08-08 PROCEDURE — 85652 RBC SED RATE AUTOMATED: CPT | Performed by: INTERNAL MEDICINE

## 2023-08-08 PROCEDURE — A9502 TC99M TETROFOSMIN: HCPCS

## 2023-08-08 PROCEDURE — 78452 HT MUSCLE IMAGE SPECT MULT: CPT | Performed by: INTERNAL MEDICINE

## 2023-08-08 PROCEDURE — 82595 ASSAY OF CRYOGLOBULIN: CPT | Performed by: INTERNAL MEDICINE

## 2023-08-08 PROCEDURE — 93016 CV STRESS TEST SUPVJ ONLY: CPT | Performed by: INTERNAL MEDICINE

## 2023-08-08 PROCEDURE — 99245 OFF/OP CONSLTJ NEW/EST HI 55: CPT | Performed by: INTERNAL MEDICINE

## 2023-08-08 PROCEDURE — 83880 ASSAY OF NATRIURETIC PEPTIDE: CPT | Performed by: INTERNAL MEDICINE

## 2023-08-08 PROCEDURE — 85025 COMPLETE CBC W/AUTO DIFF WBC: CPT | Performed by: INTERNAL MEDICINE

## 2023-08-08 PROCEDURE — G1004 CDSM NDSC: HCPCS

## 2023-08-08 PROCEDURE — 93005 ELECTROCARDIOGRAM TRACING: CPT

## 2023-08-08 RX ORDER — AMINOPHYLLINE DIHYDRATE 25 MG/ML
50 INJECTION, SOLUTION INTRAVENOUS ONCE
Status: COMPLETED | OUTPATIENT
Start: 2023-08-08 | End: 2023-08-08

## 2023-08-08 RX ORDER — REGADENOSON 0.08 MG/ML
0.4 INJECTION, SOLUTION INTRAVENOUS ONCE
Status: COMPLETED | OUTPATIENT
Start: 2023-08-08 | End: 2023-08-08

## 2023-08-08 RX ADMIN — PANTOPRAZOLE SODIUM 20 MG: 20 TABLET, DELAYED RELEASE ORAL at 17:26

## 2023-08-08 RX ADMIN — ZOLPIDEM TARTRATE 10 MG: 5 TABLET, COATED ORAL at 22:10

## 2023-08-08 RX ADMIN — PANTOPRAZOLE SODIUM 20 MG: 20 TABLET, DELAYED RELEASE ORAL at 09:19

## 2023-08-08 RX ADMIN — REGADENOSON 0.4 MG: 0.08 INJECTION, SOLUTION INTRAVENOUS at 10:47

## 2023-08-08 RX ADMIN — METOPROLOL TARTRATE 25 MG: 25 TABLET, FILM COATED ORAL at 09:13

## 2023-08-08 RX ADMIN — ACETAMINOPHEN 650 MG: 325 TABLET ORAL at 09:46

## 2023-08-08 RX ADMIN — ENOXAPARIN SODIUM 40 MG: 40 INJECTION SUBCUTANEOUS at 09:13

## 2023-08-08 RX ADMIN — SACUBITRIL AND VALSARTAN 1 TABLET: 49; 51 TABLET, FILM COATED ORAL at 11:59

## 2023-08-08 RX ADMIN — ASPIRIN 81 MG: 81 TABLET, COATED ORAL at 09:14

## 2023-08-08 RX ADMIN — RANOLAZINE 500 MG: 500 TABLET, EXTENDED RELEASE ORAL at 22:10

## 2023-08-08 RX ADMIN — RANOLAZINE 500 MG: 500 TABLET, EXTENDED RELEASE ORAL at 09:13

## 2023-08-08 RX ADMIN — METOPROLOL TARTRATE 25 MG: 25 TABLET, FILM COATED ORAL at 22:10

## 2023-08-08 RX ADMIN — SACUBITRIL AND VALSARTAN 1 TABLET: 49; 51 TABLET, FILM COATED ORAL at 22:10

## 2023-08-08 RX ADMIN — ATORVASTATIN CALCIUM 20 MG: 20 TABLET, FILM COATED ORAL at 09:13

## 2023-08-08 RX ADMIN — AMINOPHYLLINE 50 MG: 25 INJECTION, SOLUTION INTRAVENOUS at 10:51

## 2023-08-08 RX ADMIN — ALPRAZOLAM 0.5 MG: 0.5 TABLET ORAL at 22:10

## 2023-08-08 RX ADMIN — PAROXETINE 40 MG: 20 TABLET, FILM COATED ORAL at 09:14

## 2023-08-08 RX ADMIN — BUPROPION HYDROCHLORIDE 150 MG: 150 TABLET, EXTENDED RELEASE ORAL at 09:13

## 2023-08-08 RX ADMIN — AMLODIPINE BESYLATE 5 MG: 5 TABLET ORAL at 09:14

## 2023-08-08 RX ADMIN — ALPRAZOLAM 0.5 MG: 0.5 TABLET ORAL at 09:19

## 2023-08-08 NOTE — ASSESSMENT & PLAN NOTE
· 50yo F who follows with Dr Waqar Escobar presented with episodic chest pain and SOB.   · CTA negative for PE  · EKG with LBBB, chronic  · Troponins negative x 3  · Cardiac catheterization in October 2022 revealed no significant obstructive CAD  · Continue Lopressor, amlodipine, and Ranexa  · Pharmacological MPI stress test ordered

## 2023-08-08 NOTE — CONSULTS
Consultation - Cardiology   Rohini Alvarez 46 y.o. female MRN: [id number]  Unit/Bed#: -01 Encounter: [id number]  08/08/23  11:53 AM    Assessment/ Plan:  1. Chest pain, microvascular angina  • Troponins negative x 3  • EKG shows known LBBB without acute ischemic abnormalities  • Cardiac catheterization in October 2022 revealed no significant obstructive CAD  • Continue Lopressor, amlodipine, and Ranexa  • Pharmacological MPI stress test ordered    2. NICM with EF 40%  • Euvolemic on exam  • Cardiac MRI suggests LV EF 47%, RV EF 43%, otherwise unremarkable (7/25/2022)  • Recent echo shows EF 40% with global hypokinesis and regional variation, mild to moderate MR, trace to mild TR (1/3/2023)  • Continue Lopressor and Entresto    3. LBBB  • Noted in history    4. Bradycardia  • Noted in history, HR is well-controlled, continue to monitor    5. Tobacco abuse  • Strongly encourage cessation    6. Nonobstructive CAD  • Endorses chest pain, see plan above  • Continue ASA, atorvastatin, Lopressor, and Ranexa    7. Hypertension  • Currently 150/82, continue to monitor  • Continue amlodipine, Lopressor, Entresto, and Ranexa        History of Present Illness   Physician Requesting Consult: Daphne Guerra MD    Reason for Consult / Principal Problem: Chest pain    HPI: Efren Baker is a 46y.o. year old female with history of microvascular angina, NICM with EF 40%, LBBB, bradycardia, tobacco abuse, nonobstructive CAD, and hypertension who presents with chest pain. Patient follows with primary cardiologist Dr. Nidhi Cervantes who recently ordered outpatient pharmacological MPI stress test.  However, given persistence of symptoms patient decided to visit ED for further evaluation. Endorses associated diaphoresis at times. Troponins were found to be negative and EKG without acute ischemic abnormalities. Cardiology consulted for further evaluation and management.   Endorses ongoing tobacco use, smokes approximately 1 pack every 3-4 days. Reports strict compliance to all medications. Endorses intermittent LE edema. Notes she will continue to work on reducing dietary sodium intake. Denies SOB, palpitations, or syncope. Consults    EKG: Sinus bradycardia; left bundle branch block      Review of Systems   Constitutional: Negative for chills and fever. Diaphoresis: intermittent. HENT: Negative for ear pain and sore throat. Eyes: Negative for pain and visual disturbance. Respiratory: Negative for cough and shortness of breath. Cardiovascular: Positive for chest pain. Negative for palpitations. Leg swelling: intermittent. Gastrointestinal: Negative for abdominal pain and vomiting. Genitourinary: Negative for dysuria and hematuria. Musculoskeletal: Negative for arthralgias and back pain. Skin: Positive for rash (BL lower extremity). Negative for color change. Neurological: Negative for seizures and syncope. All other systems reviewed and are negative. Historical Information   Past Medical History:   Diagnosis Date   • Anxiety    • Bipolar 1 disorder ([address]) 2020   • Bipolar 1 disorder ([address]) 2020   • Bipolar disease, manic ([address])    • Depression    • Gastroesophageal reflux disease without esophagitis 2020   • Schizophrenia [facility])      Past Surgical History:   Procedure Laterality Date   • CARDIAC CATHETERIZATION N/A 10/24/2022    Procedure: Cardiac Coronary Angiogram;  Surgeon: Richi Nunez MD;  Location: [address] [place] [address] CATH LAB;   Service: Cardiology   •  SECTION      x2   • HYSTERECTOMY     • KNEE SURGERY Left     4 times   • LEG SURGERY Right     thony placed then removed, broken femur   • NEUROPLASTY / TRANSPOSITION MEDIAN NERVE AT CARPAL TUNNEL       Social History     Substance and Sexual Activity   Alcohol Use Not Currently     Social History     Substance and Sexual Activity   Drug Use Not Currently   • Types: Methamphetamines     Social History     Tobacco Use   Smoking Status Every Day   • Packs/day: 0.25   • Types: Cigarettes   Smokeless Tobacco Never       Family History:   Family History   Problem Relation Age of Onset   • Heart attack Mother    • Heart disease Mother    • Heart failure Father    • COPD Father    • Liver cancer Sister    • Colon cancer Sister 59   • Kidney cancer Sister    • Breast cancer Maternal Grandmother    • Heart disease Brother    • Colon cancer Brother 79   • Liver cancer Brother    • Heart attack Maternal Uncle        Meds/Allergies   all current active meds have been reviewed  Allergies   Allergen Reactions   • Ciprofloxacin Hives   • Wound Dressing Adhesive Other (See Comments)     unknown   • Atarax [Hydroxyzine] Palpitations       Objective   Vitals: Blood pressure 153/82, pulse 66, temperature 97.9 °F (36.6 °C), resp. rate 18, height 5' 4" (1.626 m), weight 86.2 kg (190 lb), SpO2 97 %. , Body mass index is 32.61 kg/m².,   Orthostatic Blood Pressures    Flowsheet Row Most Recent Value   Blood Pressure 153/82 filed at 08/08/2023 0714   Patient Position - Orthostatic VS Sitting filed at 08/07/2023 6517          Systolic (63HZL), IOB:145 , Min:135 , TXS:193     Diastolic (49MVU), GMQ:56, Min:70, Max:83        Intake/Output Summary (Last 24 hours) at 8/8/2023 1153  Last data filed at 8/8/2023 0946  Gross per 24 hour   Intake 520 ml   Output --   Net 520 ml       Invasive Devices     Peripheral Intravenous Line  Duration           Peripheral IV 08/07/23 Left;Ventral (anterior) Forearm 1 day    Long-Dwell Peripheral IV (Midline) 51/44/49 Right Cephalic Vein <1 day                    Physical Exam:  Physical Exam  Vitals reviewed. Constitutional:       General: She is not in acute distress. Appearance: She is obese. She is not diaphoretic. HENT:      Head: Normocephalic and atraumatic. Nose: Nose normal.   Eyes:      Conjunctiva/sclera: Conjunctivae normal.   Cardiovascular:      Rate and Rhythm: Normal rate and regular rhythm.       Pulses: Normal pulses. Heart sounds: Normal heart sounds. No murmur heard. No friction rub. Pulmonary:      Effort: Pulmonary effort is normal. No respiratory distress. Breath sounds: Normal breath sounds. No wheezing or rales. Chest:      Chest wall: No tenderness. Abdominal:      Palpations: Abdomen is soft. Tenderness: There is no abdominal tenderness. Musculoskeletal:         General: No swelling. Cervical back: Neck supple. Right lower leg: No edema. Left lower leg: No edema. Skin:     General: Skin is warm and dry. Capillary Refill: Capillary refill takes less than 2 seconds. Findings: Rash (BL lower extremity) present. Neurological:      Mental Status: She is alert and oriented to person, place, and time.    Psychiatric:         Mood and Affect: Mood normal.          Lab Results:     Cardiac Profile:   Results from last 7 days   Lab Units 08/07/23  1620 08/07/23  1402 08/07/23  1039   HS TNI 0HR ng/L  --   --  9   HS TNI 2HR ng/L  --  8  --    HSTNI D2 ng/L  --  -1  --    HS TNI 4HR ng/L 7  --   --    HSTNI D4 ng/L -2  --   --        CBC with diff:   Results from last 7 days   Lab Units 08/08/23  0642 08/07/23  1039   WBC Thousand/uL 5.54 7.61   HEMOGLOBIN g/dL 13.3 13.5   HEMATOCRIT % 40.1 40.1   MCV fL 90 90   PLATELETS Thousands/uL 189 203   RBC Million/uL 4.47 4.46   MCH pg 29.8 30.3   MCHC g/dL 33.2 33.7   RDW % 12.0 12.1   MPV fL 9.6 9.4   NRBC AUTO /100 WBCs 0 0         CMP:   Results from last 7 days   Lab Units 08/08/23  0642 08/07/23  1039   POTASSIUM mmol/L 4.5 4.4   CHLORIDE mmol/L 107 107   CO2 mmol/L 27 26   BUN mg/dL 18 22   CREATININE mg/dL 0.67 0.75   CALCIUM mg/dL 9.3 9.4   AST U/L 17 21   ALT U/L 20 19   ALK PHOS U/L 63 84   EGFR ml/min/1.73sq m 102 93

## 2023-08-08 NOTE — CASE MANAGEMENT
Case Management Assessment & Discharge Planning Note    Patient name Jeanine Millan  Location /-01 MRN [id number]  : 1975 Date 2023       Current Admission Date: 2023  Current Admission Diagnosis:Chest pain   Patient Active Problem List    Diagnosis Date Noted   • Chest pain 2023   • Rash 2023   • Non-ischemic cardiomyopathy ([address]) 2022   • Unstable angina ([address]) 10/11/2022   • Hematoma 10/24/2021   • C. difficile colitis 10/24/2021   • Finger infection 2021   • Recurrent major depressive disorder, in partial remission ([address]) 2020   • Dysuria 2020   • Gastroesophageal reflux disease without esophagitis 2020   • Genital labial ulcer 2020   • Bipolar 1 disorder ([address]) 2020   • Schizophrenia ([address]) 2020   • Essential hypertension 2020   • Other fatigue 2020   • Tobacco abuse 2020   • Dyslipidemia, goal LDL below 70 2020   • Well adult exam 2020      LOS (days): 0  Geometric Mean LOS (GMLOS) (days):   Days to GMLOS:     OBJECTIVE:              Current admission status: Observation       [facility]acy:   [facility] [address] [address]  Phone: [phone] Fax: [phone]    Primary Care Provider: Sandra Mathis MD    Primary Insurance: Andie Serna  Secondary Insurance:     ASSESSMENT:  [address] [place] [address] Representative - Spouse   Primary Phone: [phone] [facility])  Mobile Phone: [phone]                    Obs Notice Signed: 23 (CM reviewed OBS notice with patient at bedside and patient reported understanding.  Patient denied having any questions and was given a copy signed copy was placed in  medical records.)    Readmission Root Cause  30 Day Readmission: No    Patient Information  Admitted from[de-identified] Home  Mental Status: Alert  During Assessment patient was accompanied by: Not accompanied during assessment  Assessment information provided by[de-identified] Patient  Primary Caregiver: Self  Support Systems: Self, Spouse/significant other, Family members  [place] of Residence: [facility] do you live in?: [address] [place] [address] entry access options.  Select all that apply.: Stairs  Number of steps to enter home.: 10  Do the steps have railings?: Yes  Type of Current Residence: 2 story home  Upon entering residence, is there a bedroom on the main floor (no further steps)?: Yes  Upon entering residence, is there a bathroom on the main floor (no further steps)?: Yes  In the last 12 months, was there a time when you were not able to pay the mortgage or rent on time?: Yes  In the last 12 months, how many places have you lived?: 2 (brother )  In the last 12 months, was there a time when you did not have a steady place to sleep or slept in a shelter (including now)?: Yes (patient lives with brother and  now.)  Homeless/housing insecurity resource given?: N/A  Living Arrangements: Lives w/ Spouse/significant other, Lives w/ Extended Family  Is patient a ?: No    Activities of Daily Living Prior to Admission  Functional Status: Independent  Completes ADLs independently?: Yes  Ambulates independently?: Yes  Does patient use assisted devices?: No  Does patient currently own DME?: No  Does patient have a history of Outpatient Therapy (PT/OT)?: No  Does the patient have a history of Short-Term Rehab?: No  Does patient have a history of HHC?: No  Does patient currently have [address] [facility]?: No         Patient Information Continued  Income Source: SSI/SSD  Does patient have prescription coverage?: Yes  Within the past 12 months, you worried that your food would run out before you got the money to buy more.: Never true  Within the past 12 months, the food you bought just didn't last and you didn't have money to get more.: Never true  Food insecurity resource given?: N/A  Does patient receive dialysis treatments?: No  Does patient have a history of substance abuse?: Yes  Historical substance use preference: Methamphetamines  History of Withdrawal Symptoms: Denies past symptoms  Is patient currently in treatment for substance abuse?: Yes, N/A - sober  Does patient have a history of Mental Health Diagnosis?: Yes  Is patient receiving treatment for mental health?: Yes  Has patient received inpatient treatment related to mental health in the last 2 years?:  (Patient stated they have history of inpatient psych as well as inpatient substance abuse facilities but did not state timeframe or names. Inpatient psych was in [facility].)    [facility] 2/9 Screening   Reviewed PHQ 2/9 Depression Screening Score?: No    Means of Transportation  Means of Transport to Appts[de-identified] Other (Comment) (ride share)  In the past 12 months, has lack of transportation kept you from medical appointments or from getting medications?: No  In the past 12 months, has lack of transportation kept you from meetings, work, or from getting things needed for daily living?: No  Was application for public transport provided?: N/A        DISCHARGE DETAILS:    Discharge planning discussed with[de-identified] Patient at bedside  Freedom of Choice: Yes  Comments - Freedom of Choice: CM discussed freedom of choice as it pertains to discharge planning. Patient denied having any needs.   CM contacted family/caregiver?: No- see comments (declined)  Were Treatment Team discharge recommendations reviewed with patient/caregiver?: Yes  Did patient/caregiver verbalize understanding of patient care needs?: Yes  Were patient/caregiver advised of the risks associated with not following Treatment Team discharge recommendations?: Yes         Requested [address]          Is the patient interested in [facility] at discharge?: No    DME Referral Provided  Referral made for DME?: No    Other Referral/Resources/Interventions Provided:  Interventions: None Indicated    Would you like to participate in our [facility] service program?  : No - Declined    Treatment Team Recommendation: Home  Discharge Destination Plan[de-identified] Home  Transport at Discharge : [address], Other (Comment) (Patient stated that if patient is out by 11:45, patient can go next door to infusion center and leave with brother.)

## 2023-08-08 NOTE — ASSESSMENT & PLAN NOTE
** Patient has bilateral lower extremity swelling and a red macular nonblanchable rash which she states has been getting worse, associated with edema. Also mentions joint stiffness. She does not appear septic. · ESR/CRP negative  · This a.m. patient swelling much improved/rash much improved without any intervention. · Question underlying lymphedema versus neuropathy versus rheumatological issue  · Ambulatory referral placed to rheumatology.

## 2023-08-08 NOTE — UTILIZATION REVIEW
Initial Clinical Review    Admission: Date/Time/Statement:   Admission Orders (From admission, onward)     Ordered        08/07/23 1512  Place in Observation  Once                      Orders Placed This Encounter   Procedures   • Place in Observation     Standing Status:   Standing     Number of Occurrences:   1     Order Specific Question:   Level of Care     Answer:   Med Surg [16]     ED Arrival Information     Expected   -    Arrival   8/7/2023 10:04    Acuity   Emergent            Means of arrival   Walk-In    Escorted by   Self    Service   Hospitalist    Admission type   Emergency            Arrival complaint   Chest pain, leg swelling           Chief Complaint   Patient presents with   • Leg Swelling     Leg swelling, sob, reports chest pain yesterday        Initial Presentation: 46 y.o. female to ED from home w/ SOB and CP . Was planned for OP stress test . + LE swelling . Rash on LE and joint stiffness. CTA chest neg for PE , venous duplex neg for DVT . EKG LBBB, chronic . Trop neg . Admitted OBS status w/ CP and rash plan for cardiology consult , serial trop , tele . check ESR, CRP, NILS, RF. Hx HTN and bipolar cont home meds . PE: BLE edema , rash BLE R>L .          ED Triage Vitals   Temperature Pulse Respirations Blood Pressure SpO2   08/07/23 1008 08/07/23 1008 08/07/23 1008 08/07/23 1008 08/07/23 1008   98 °F (36.7 °C) 68 (!) 24 148/77 97 %      Temp src Heart Rate Source Patient Position - Orthostatic VS BP Location FiO2 (%)   -- 08/07/23 1510 08/07/23 1510 08/07/23 1510 --    Monitor Sitting Right arm       Pain Score       08/07/23 1651       2          Wt Readings from Last 1 Encounters:   08/07/23 86.5 kg (190 lb 11.2 oz)     Additional Vital Signs:   08/07/23 22:01:45 97.9 °F (36.6 °C) 63 17 155/83 107 96 % -- --   08/07/23 1658 -- -- -- -- -- 97 % None (Room air) --   08/07/23 1643 97.7 °F (36.5 °C) 63 -- 158/82 107 96 % -- --   08/07/23 1510 -- 62 22 135/70 97 96 % None (Room air) Sitting Pertinent Labs/Diagnostic Test Results:   8/7 EKG SB LBBB  CTA ED chest PE Study   Final Result by Lisa Toribio MD (08/07 1311)      No evidence of pulmonary embolus.                   Workstation performed: [id number]         VAS lower limb venous duplex study, unilateral/limited   Final Result by Jignesh Betancourt MD (08/07 1418)            Results from last 7 days   Lab Units 08/07/23  1039   WBC Thousand/uL 7.61   HEMOGLOBIN g/dL 13.5   HEMATOCRIT % 40.1   PLATELETS Thousands/uL 203   NEUTROS ABS Thousands/µL 4.81         Results from last 7 days   Lab Units 08/07/23  1039   SODIUM mmol/L 138   POTASSIUM mmol/L 4.4   CHLORIDE mmol/L 107   CO2 mmol/L 26   ANION GAP mmol/L 5   BUN mg/dL 22   CREATININE mg/dL 0.75   EGFR ml/min/1.73sq m 93   CALCIUM mg/dL 9.4     Results from last 7 days   Lab Units 08/07/23  1039   AST U/L 21   ALT U/L 19   ALK PHOS U/L 84   TOTAL PROTEIN g/dL 7.2   ALBUMIN g/dL 4.3   TOTAL BILIRUBIN mg/dL 0.37     Results from last 7 days   Lab Units 08/07/23  1039   GLUCOSE RANDOM mg/dL 161*       Results from last 7 days   Lab Units 08/07/23  1620 08/07/23  1402 08/07/23  1039   HS TNI 0HR ng/L  --   --  9   HS TNI 2HR ng/L  --  8  --    HSTNI D2 ng/L  --  -1  --    HS TNI 4HR ng/L 7  --   --    HSTNI D4 ng/L -2  --   --      Results from last 7 days   Lab Units 08/07/23  1039   PROTIME seconds 12.3   INR  0.93   PTT seconds 28     ED Treatment:   Medication Administration from 08/07/2023 1004 to 08/07/2023 1634       Date/Time Order Dose Route Action Action by Comments     08/07/2023 1145 EDT iohexol (OMNIPAQUE) 350 MG/ML injection (MULTI-DOSE) 85 mL 85 mL Intravenous Given Sarah Lot --        Past Medical History:   Diagnosis Date   • Anxiety    • Bipolar 1 disorder ([address]) 7/24/2020   • Bipolar 1 disorder ([address]) 7/24/2020   • Bipolar disease, manic ([address])    • Depression    • Gastroesophageal reflux disease without esophagitis 7/24/2020   • Schizophrenia ([address])      Present on Admission:  • Recurrent major depressive disorder, in partial remission (HCC)  • Tobacco abuse  • Bipolar 1 disorder (HCC)  • Chest pain  • Essential hypertension  • Rash      Admitting Diagnosis: Chest pain [R07.9]  Dyspnea [R06.00]  Age/Sex: 46 y.o. female  Admission Orders:  Scheduled Medications:  amLODIPine, 5 mg, Oral, Daily  aspirin, 81 mg, Oral, Daily  atorvastatin, 20 mg, Oral, Daily  buPROPion, 150 mg, Oral, QAM  enoxaparin, 40 mg, Subcutaneous, Daily  metoprolol tartrate, 25 mg, Oral, Q12H  nicotine, 1 patch, Transdermal, Daily  pantoprazole, 20 mg, Oral, BID AC  PARoxetine, 40 mg, Oral, QAM  ranolazine, 500 mg, Oral, BID  sacubitril-valsartan, 1 tablet, Oral, BID      Continuous IV Infusions:     PRN Meds:  acetaminophen, 650 mg, Oral, Q6H PRN  ALPRAZolam, 0.5 mg, Oral, BID PRN  oxyCODONE, 2.5 mg, Oral, Q4H PRN  zolpidem, 10 mg, Oral, HS PRN        IP CONSULT TO CARDIOLOGY    Network Utilization Review Department  ATTENTION: Please call with any questions or concerns to [phone] and carefully listen to the prompts so that you are directed to the right person. All voicemails are confidential.  Raphael Melchor all requests for admission clinical reviews, approved or denied determinations and any other requests to dedicated fax number below belonging to the campus where the patient is receiving treatment.  List of dedicated fax numbers for the Facilities:  [place] DENIALS (Administrative/Medical Necessity) [phone] [facility] (Maternity/NICU/Pediatrics) [phone]   [address] [place] [address] [address] [place] [address] [facility] [phone] [address] [place] [address] [place] [id number] [address] [phone]   [address] [place] [address]  [facility] [phone]

## 2023-08-08 NOTE — CASE MANAGEMENT
Case Management Assessment & Discharge Planning Note    Patient name Kaushal Sole  Location /-01 MRN [id number]  : 1975 Date 2023       Current Admission Date: 2023  Current Admission Diagnosis:Chest pain   Patient Active Problem List    Diagnosis Date Noted   • Chest pain 2023   • Rash 2023   • Non-ischemic cardiomyopathy ([address]) 2022   • Unstable angina ([address]) 10/11/2022   • Hematoma 10/24/2021   • C. difficile colitis 10/24/2021   • Finger infection 2021   • Recurrent major depressive disorder, in partial remission ([address]) 2020   • Dysuria 2020   • Gastroesophageal reflux disease without esophagitis 2020   • Genital labial ulcer 2020   • Bipolar 1 disorder ([address]) 2020   • Schizophrenia ([address]) 2020   • Essential hypertension 2020   • Other fatigue 2020   • Tobacco abuse 2020   • Dyslipidemia, goal LDL below 70 2020   • Well adult exam 2020      LOS (days): 0  Geometric Mean LOS (GMLOS) (days):   Days to GMLOS:     OBJECTIVE:              Current admission status: Observation       [facility]acy:   [facility] [address]  Phone: [phone] Fax: [phone]    Primary Care Provider: Teo Dotson MD    Primary Insurance: Riki Souza  Secondary Insurance:     ASSESSMENT:  [address] [place] [address] Representative - Spouse   Primary Phone: [phone] (Home)  Mobile Phone: [phone]                         Readmission Root Cause  30 Day Readmission: No    Patient Information  Admitted from[de-identified] Home  Mental Status: Alert  During Assessment patient was accompanied by: Not accompanied during assessment  Assessment information provided by[de-identified] Patient  Primary Caregiver: Self  Support Systems: Self, Spouse/significant other, Family members  [place] of Residence: [address] [place] [address] you live in?: [facility] entry access options.  Select all that apply.: Stairs  Number of steps to enter home.: 10  Do the steps have railings?: Yes  Type of Current Residence: 2 story home  Upon entering residence, is there a bedroom on the main floor (no further steps)?: Yes  Upon entering residence, is there a bathroom on the main floor (no further steps)?: Yes  In the last 12 months, was there a time when you were not able to pay the mortgage or rent on time?: Yes  In the last 12 months, how many places have you lived?: 2 (brother )  In the last 12 months, was there a time when you did not have a steady place to sleep or slept in a shelter (including now)?: Yes (patient lives with brother and  now.)  Homeless/housing insecurity resource given?: N/A  Living Arrangements: Lives w/ Spouse/significant other, Lives w/ Extended Family  Is patient a ?: No    Activities of Daily Living Prior to Admission  Functional Status: Independent  Completes ADLs independently?: Yes  Ambulates independently?: Yes  Does patient use assisted devices?: No  Does patient currently own DME?: No  Does patient have a history of Outpatient Therapy (PT/OT)?: No  Does the patient have a history of Short-Term Rehab?: No  Does patient have a history of HHC?: No  Does patient currently have [address] [facility]?: No         Patient Information Continued  Income Source: SSI/SSD  Does patient have prescription coverage?: Yes  Within the past 12 months, you worried that your food would run out before you got the money to buy more.: Never true  Within the past 12 months, the food you bought just didn't last and you didn't have money to get more.: Never true  Food insecurity resource given?: N/A  Does patient receive dialysis treatments?: No  Does patient have a history of substance abuse?: Yes  Historical substance use preference: Methamphetamines  History of Withdrawal Symptoms: Denies past symptoms  Is patient currently in treatment for substance abuse?: Yes, N/A - sober  Does patient have a history of Mental Health Diagnosis?: Yes  Is patient receiving treatment for mental health?: Yes  Has patient received inpatient treatment related to mental health in the last 2 years?:  (Patient stated they have history of inpatient psych as well as inpatient substance abuse facilities but did not state timeframe or names. Inpatient psych was in [facility].)    [facility] 2/9 Screening   Reviewed PHQ 2/9 Depression Screening Score?: No    Means of Transportation  Means of Transport to Appts[de-identified] Other (Comment) (ride share)  In the past 12 months, has lack of transportation kept you from medical appointments or from getting medications?: No  In the past 12 months, has lack of transportation kept you from meetings, work, or from getting things needed for daily living?: No  Was application for public transport provided?: N/A        DISCHARGE DETAILS:    Discharge planning discussed with[de-identified] Patient at bedside  Freedom of Choice: Yes  Comments - Freedom of Choice: CM discussed freedom of choice as it pertains to discharge planning. Patient denied having any needs.   CM contacted family/caregiver?: No- see comments (declined)  Were Treatment Team discharge recommendations reviewed with patient/caregiver?: Yes  Did patient/caregiver verbalize understanding of patient care needs?: Yes  Were patient/caregiver advised of the risks associated with not following Treatment Team discharge recommendations?: Yes         Requested [address]         Is the patient interested in [facility] at discharge?: No    DME Referral Provided  Referral made for DME?: No    Other Referral/Resources/Interventions Provided:  Interventions: None Indicated    Would you like to participate in our [address] service program?  : No - Declined    Treatment Team Recommendation: Home  Discharge Destination Plan[de-identified] Home  Transport at Discharge : [address], Other (Comment) (Patient stated that if patient is out by 11:45, patient can go next door to infusion center and leave with brother.)

## 2023-08-08 NOTE — PROGRESS NOTES
[address]  Progress Note  Name: oDry Keene  MRN: [id number]  Unit/Bed#: -01 I Date of Admission: 8/7/2023   Date of Service: 8/8/2023 I Hospital Day: 0    Assessment/Plan   * Chest pain  Assessment & Plan  · 52yo F who follows with Dr Oleg Anderson presented with episodic chest pain and SOB. · CTA negative for PE  · EKG with LBBB, chronic  · Troponins negative x 3  · Cardiac catheterization in October 2022 revealed no significant obstructive CAD  · Continue Lopressor, amlodipine, and Ranexa  · Pharmacological MPI stress test ordered    Rash  Assessment & Plan  ** Patient has bilateral lower extremity swelling and a red macular nonblanchable rash which she states has been getting worse, associated with edema. Also mentions joint stiffness. She does not appear septic. · ESR/CRP negative  · This a.m. patient swelling much improved/rash much improved without any intervention. · Question underlying lymphedema versus neuropathy versus rheumatological issue  · Ambulatory referral placed to rheumatology. Essential hypertension  Assessment & Plan  · Continue amlodipine, metoprolol  · Monitor BP    Tobacco abuse  Assessment & Plan  · Smoking cessation discussed and recommended >3m  · NRT to be provided while in the hospital    Bipolar 1 disorder ([address])  Assessment & Plan  · Stable  · Continue bupropion, alprazolam    Recurrent major depressive disorder, in partial remission ([address])  Assessment & Plan  · Continue with Wellbutrin/Paxil/Xanax         VTE Pharmacologic Prophylaxis:   Pharmacologic: Enoxaparin (Lovenox)  Mechanical VTE Prophylaxis in Place: No    Patient Centered Rounds: I have performed bedside rounds with nursing staff today. Discussions with Specialists or Other Care Team Provider: cardiology    Education and Discussions with Family / Patient: dw patient, sister    Time Spent for Care: 30 minutes.   More than 50% of total time spent on counseling and coordination of care as described above. Current Length of Stay: 0 day(s)    Current Patient Status: Observation   Certification Statement: The patient will continue to require additional inpatient hospital stay due to needs stress, le swelling    Discharge Plan: tomorrow    Code Status: Level 1 - Full Code      Subjective: This morning patient seen at bedside, lower extremity swelling much improved, rash much improved  No fevers/chest pain almost completely resolved/no shortness of breath/palpitations or lightheadedness. Objective:     Vitals:   Temp (24hrs), Av.8 °F (36.6 °C), Min:97.7 °F (36.5 °C), Max:97.9 °F (36.6 °C)    Temp:  [97.7 °F (36.5 °C)-97.9 °F (36.6 °C)] 97.7 °F (36.5 °C)  HR:  [55-66] 55  Resp:  [17-22] 18  BP: (135-158)/(70-84) 146/84  SpO2:  [96 %-97 %] 97 %  Body mass index is 32.61 kg/m². Input and Output Summary (last 24 hours): Intake/Output Summary (Last 24 hours) at 2023 1335  Last data filed at 2023 0946  Gross per 24 hour   Intake 520 ml   Output --   Net 520 ml       Physical Exam:     Physical Exam  Constitutional:       General: She is not in acute distress. Appearance: She is obese. She is not toxic-appearing. HENT:      Mouth/Throat:      Mouth: Mucous membranes are moist.      Pharynx: Oropharynx is clear. Cardiovascular:      Rate and Rhythm: Normal rate and regular rhythm. Pulses: Normal pulses. Pulmonary:      Effort: No respiratory distress. Breath sounds: Normal breath sounds. Abdominal:      General: Abdomen is flat. Bowel sounds are normal.      Palpations: Abdomen is soft. Musculoskeletal:      Right lower leg: Edema present. Left lower leg: Edema present. Neurological:      Mental Status: She is alert and oriented to person, place, and time.          Additional Data:     Labs:    Results from last 7 days   Lab Units 23  0642   WBC Thousand/uL 5.54   HEMOGLOBIN g/dL 13.3   HEMATOCRIT % 40.1   PLATELETS Thousands/uL 189   NEUTROS PCT % 55   LYMPHS PCT % 29   MONOS PCT % 11   EOS PCT % 4     Results from last 7 days   Lab Units 08/08/23  0642   SODIUM mmol/L 138   POTASSIUM mmol/L 4.5   CHLORIDE mmol/L 107   CO2 mmol/L 27   BUN mg/dL 18   CREATININE mg/dL 0.67   ANION GAP mmol/L 4   CALCIUM mg/dL 9.3   ALBUMIN g/dL 4.0   TOTAL BILIRUBIN mg/dL 0.34   ALK PHOS U/L 63   ALT U/L 20   AST U/L 17   GLUCOSE RANDOM mg/dL 145*     Results from last 7 days   Lab Units 08/07/23  1039   INR  0.93                   * I Have Reviewed All Lab Data Listed Above. * Additional Pertinent Lab Tests Reviewed: All Labs Within Last 24 Hours Reviewed    Recent Cultures (last 7 days):           Last 24 Hours Medication List:   Current Facility-Administered Medications   Medication Dose Route Frequency Provider Last Rate   • acetaminophen  650 mg Oral Q6H PRN Felecia Gonzales MD     • ALPRAZolam  0.5 mg Oral BID PRN Felecia Gonzales MD     • amLODIPine  5 mg Oral Daily Felecia Gonzales MD     • aspirin  81 mg Oral Daily Felecia Gonzales MD     • atorvastatin  20 mg Oral Daily Felecia Gonzales MD     • buPROPion  150 mg Oral QAM Felecia Gonzales MD     • enoxaparin  40 mg Subcutaneous Daily Felecia Gonzales MD     • metoprolol tartrate  25 mg Oral Q12H Felecia Gonzales MD     • nicotine  1 patch Transdermal Daily Felecia Gonzales MD     • oxyCODONE  2.5 mg Oral Q4H PRN Reji Mendenhall MD     • pantoprazole  20 mg Oral BID AN Chua PA-C     • PARoxetine  40 mg Oral QAM Felecia Gonzales MD     • ranolazine  500 mg Oral BID Felecia Gonzales MD     • sacubitril-valsartan  1 tablet Oral BID Felecia Gonzales MD     • zolpidem  10 mg Oral HS PRN Felecia Gonzales MD          Today, Patient Was Seen By: Mer Love M.D.     ** Please Note: Dictation voice to text software may have been used in the creation of this document.  **

## 2023-08-09 VITALS
DIASTOLIC BLOOD PRESSURE: 86 MMHG | RESPIRATION RATE: 16 BRPM | SYSTOLIC BLOOD PRESSURE: 143 MMHG | TEMPERATURE: 97.9 F | HEART RATE: 60 BPM | HEIGHT: 64 IN | OXYGEN SATURATION: 95 % | WEIGHT: 190 LBS | BODY MASS INDEX: 32.44 KG/M2

## 2023-08-09 PROBLEM — R21 RASH: Status: RESOLVED | Noted: 2023-08-07 | Resolved: 2023-08-09

## 2023-08-09 LAB
ANA SER QL IA: NEGATIVE
ATRIAL RATE: 57 BPM
ATRIAL RATE: 59 BPM
P AXIS: 36 DEGREES
P AXIS: 50 DEGREES
PR INTERVAL: 154 MS
PR INTERVAL: 168 MS
QRS AXIS: 50 DEGREES
QRS AXIS: 53 DEGREES
QRSD INTERVAL: 140 MS
QRSD INTERVAL: 140 MS
QT INTERVAL: 484 MS
QT INTERVAL: 484 MS
QTC INTERVAL: 471 MS
QTC INTERVAL: 479 MS
T WAVE AXIS: 111 DEGREES
T WAVE AXIS: 111 DEGREES
VENTRICULAR RATE: 57 BPM
VENTRICULAR RATE: 59 BPM

## 2023-08-09 PROCEDURE — 93010 ELECTROCARDIOGRAM REPORT: CPT | Performed by: INTERNAL MEDICINE

## 2023-08-09 PROCEDURE — 99239 HOSP IP/OBS DSCHRG MGMT >30: CPT | Performed by: FAMILY MEDICINE

## 2023-08-09 RX ORDER — NICOTINE 21 MG/24HR
1 PATCH, TRANSDERMAL 24 HOURS TRANSDERMAL DAILY
Qty: 28 PATCH | Refills: 0 | Status: SHIPPED | OUTPATIENT
Start: 2023-08-09

## 2023-08-09 RX ADMIN — AMLODIPINE BESYLATE 5 MG: 5 TABLET ORAL at 09:36

## 2023-08-09 RX ADMIN — RANOLAZINE 500 MG: 500 TABLET, EXTENDED RELEASE ORAL at 09:36

## 2023-08-09 RX ADMIN — PANTOPRAZOLE SODIUM 20 MG: 20 TABLET, DELAYED RELEASE ORAL at 07:21

## 2023-08-09 RX ADMIN — METOPROLOL TARTRATE 25 MG: 25 TABLET, FILM COATED ORAL at 09:36

## 2023-08-09 RX ADMIN — SACUBITRIL AND VALSARTAN 1 TABLET: 49; 51 TABLET, FILM COATED ORAL at 09:37

## 2023-08-09 RX ADMIN — BUPROPION HYDROCHLORIDE 150 MG: 150 TABLET, EXTENDED RELEASE ORAL at 09:36

## 2023-08-09 RX ADMIN — ALPRAZOLAM 0.5 MG: 0.5 TABLET ORAL at 09:36

## 2023-08-09 RX ADMIN — ENOXAPARIN SODIUM 40 MG: 40 INJECTION SUBCUTANEOUS at 09:36

## 2023-08-09 RX ADMIN — ATORVASTATIN CALCIUM 20 MG: 20 TABLET, FILM COATED ORAL at 09:36

## 2023-08-09 RX ADMIN — ASPIRIN 81 MG: 81 TABLET, COATED ORAL at 09:36

## 2023-08-09 RX ADMIN — PAROXETINE 40 MG: 20 TABLET, FILM COATED ORAL at 09:36

## 2023-08-09 NOTE — NURSING NOTE
Patient given discharge instructions, aware of f/u appointments, aware of scripts to pickup. Belongings with patient. Hep lock, midline removed. Patient ambulated to main lobby for discharge to home.

## 2023-08-09 NOTE — ASSESSMENT & PLAN NOTE
· 45 yo F who follows with Dr Coral Chávez presented with episodic chest pain and SOB.   · CTA negative for PE  · EKG with LBBB, chronic  · Troponins negative x 3  · Cardiac catheterization in October 2022 revealed no significant obstructive CAD  · Continue Lopressor, amlodipine, and Ranexa  · Pharmacological MPI stress test neg, cleared for dc by cardiology

## 2023-08-09 NOTE — PLAN OF CARE
Problem: CARDIOVASCULAR - ADULT  Goal: Maintains optimal cardiac output and hemodynamic stability  Description: INTERVENTIONS:  - Monitor I/O, vital signs and rhythm  - Monitor for S/S and trends of decreased cardiac output  - Administer and titrate ordered vasoactive medications to optimize hemodynamic stability  - Assess quality of pulses, skin color and temperature  - Assess for signs of decreased coronary artery perfusion  - Instruct patient to report change in severity of symptoms  Outcome: Progressing     Problem: PAIN - ADULT  Goal: Verbalizes/displays adequate comfort level or baseline comfort level  Description: Interventions:  - Encourage patient to monitor pain and request assistance  - Assess pain using appropriate pain scale  - Administer analgesics based on type and severity of pain and evaluate response  - Implement non-pharmacological measures as appropriate and evaluate response  - Consider cultural and social influences on pain and pain management  - Notify physician/advanced practitioner if interventions unsuccessful or patient reports new pain  Outcome: Progressing     Problem: INFECTION - ADULT  Goal: Absence or prevention of progression during hospitalization  Description: INTERVENTIONS:  - Assess and monitor for signs and symptoms of infection  - Monitor lab/diagnostic results  - Monitor all insertion sites, i.e. indwelling lines, tubes, and drains  - Monitor endotracheal if appropriate and nasal secretions for changes in amount and color  - [place] appropriate cooling/warming therapies per order  - Administer medications as ordered  - Instruct and encourage patient and family to use good hand hygiene technique  - Identify and instruct in appropriate isolation precautions for identified infection/condition  Outcome: Progressing

## 2023-08-09 NOTE — DISCHARGE SUMMARY
[address] [facility]  Discharge- [facility] [place] 1975, 46 y.o. female MRN: [id number]  Unit/Bed#: -Fannie Encounter: [id number]  Primary Care Provider: Sharlene Moritz, MD   Date and time admitted to hospital: 8/7/2023 10:12 AM    * Chest pain  Assessment & Plan  · 45 yo F who follows with Dr Valeria Enriquez presented with episodic chest pain and SOB. · CTA negative for PE  · EKG with LBBB, chronic  · Troponins negative x 3  · Cardiac catheterization in October 2022 revealed no significant obstructive CAD  · Continue Lopressor, amlodipine, and Ranexa  · Pharmacological MPI stress test neg, cleared for dc by cardiology    Rash-resolved as of 8/9/2023  Assessment & Plan  ** Patient has bilateral lower extremity swelling and a red macular nonblanchable rash which she states has been getting worse, associated with edema. Also mentions joint stiffness. She does not appear septic. · ESR/CRP negative  · This a.m. patient swelling much improved/rash much improved without any intervention. · Question underlying lymphedema versus neuropathy versus rheumatological issue  · Ambulatory referral placed to rheumatology.     Essential hypertension  Assessment & Plan  · Continue amlodipine, metoprolol      Tobacco abuse  Assessment & Plan  · Smoking cessation discussed and recommended >3m  · NRT to be provided while in the hospital    Bipolar 1 disorder ([address])  Assessment & Plan  · Stable  · Continue bupropion, alprazolam    Recurrent major depressive disorder, in partial remission [facility])  Assessment & Plan  · Continue with Wellbutrin/Paxil/Xanax      Discharging Physician / Practitioner: Kwame Loza MD  PCP: Sharlene Moritz, MD  Admission Date:   Admission Orders (From admission, onward)     Ordered        08/07/23 1512  Place in Observation  Once                      Discharge Date: 08/09/23    Disposition:      · home    Reason for Admission: chest pain    Discharge Diagnoses:     Please see assessment and plan section above for further details regarding discharge diagnoses. Medical Problems     Resolved Problems  Date Reviewed: 7/12/2023          Resolved    Rash 8/9/2023     Resolved by  Marc Nguyen MD          Consultations During Hospital Stay:  · cardiology    Medication Adjustments and Discharge Medications:  · Summary of Medication Adjustments made as a result of this hospitalization: see med rec  · Medication Dosing Tapers - Please refer to Discharge Medication List for details on any medication dosing tapers (if applicable to patient). · Medications being temporarily held (include recommended restart time): see med rec  · Discharge Medication List: See after visit summary for reconciled discharge medications. Diet Recommendations at Discharge:  Diet -        Diet Orders   (From admission, onward)             Start     Ordered    08/08/23 1856  Diet Cardiovascular; Cardiac  Diet effective now        References:    Adult Nutrition Support Algorithm    RD Therapeutic Diet Order Protocol   Question Answer Comment   Diet Type Cardiovascular    Cardiac Cardiac    RD to adjust diet per protocol? Yes        08/08/23 1855                Hospital Course:     Yefri Espino is a 46 y.o. female patient who originally presented to the hospital on 8/7/2023 With known underlying history of CAD who presents to the ED with symptoms of shortness of breath/episodic chest discomfort described as tightness. She has been following outpatient with her cardiologist and was planned to have outpatient stress test which is now being done as an inpatient and was sent to the ED for further evaluation. She also described having increased lower extremity swelling with a new rash. For the chest pain, CT PE was negative/EKG noted left bundle branch block which is chronic. Troponins negative. Cardiac cath in October 22 revealed no obstructive CAD. Pharmacological stress test was negative. Cleared for DC by cardiology. Continue home Lopressor/amlodipine/Ranexa    For the bilateral lower extremity swelling with a red macular nonblanchable rash which she stated had been getting worse. She also reported having joint stiffness. Did not appear to be septic. ESR CRP negative. Patient reported this morning swelling much improved/rash much improved without any intervention. Question lymphedema versus underlying rheumatological issue. Ambulatory referral placed to rheumatology. This morning patient reports no further chest pain/shortness of breath improved. No lower extremity swelling and rash has resolved as well. She verbalizes understanding of plan above and is in agreement. DC home. Condition at Discharge: fair     Discharge Day Visit / Exam:     Vitals: Blood Pressure: 143/86 (08/09/23 0714)  Pulse: 60 (08/09/23 0714)  Temperature: 97.9 °F (36.6 °C) (08/09/23 0714)  Temp Source: Oral (08/09/23 0714)  Respirations: 16 (08/09/23 0714)  Height: 5' 4" (162.6 cm) (08/08/23 1045)  Weight - Scale: 86.2 kg (190 lb) (08/08/23 1045)  SpO2: 95 % (08/09/23 1006)  Exam:   Physical Exam  Constitutional:       General: She is not in acute distress. Appearance: She is obese. She is not toxic-appearing. HENT:      Mouth/Throat:      Mouth: Mucous membranes are moist.      Pharynx: Oropharynx is clear. Cardiovascular:      Rate and Rhythm: Normal rate and regular rhythm. Pulses: Normal pulses. Pulmonary:      Effort: Pulmonary effort is normal. No respiratory distress. Breath sounds: Normal breath sounds. Abdominal:      General: Abdomen is flat. Bowel sounds are normal.      Palpations: Abdomen is soft. Musculoskeletal:      Right lower leg: No edema. Left lower leg: No edema. Neurological:      General: No focal deficit present. Mental Status: She is alert and oriented to person, place, and time.          Goals of Care Discussions:  · Code Status at Discharge: Level 1 - Full Code    Discharge instructions/Information to patient and family:   See after visit summary section titled Discharge Instructions for information provided to patient and family. Discharge Statement:  I spent 45 minutes discharging the patient. This time was spent on the day of discharge. I had direct contact with the patient on the day of discharge. Greater than 50% of the total time was spent examining patient, answering all patient questions, arranging and discussing plan of care with patient as well as directly providing post-discharge instructions. Additional time then spent on discharge activities.     ** Please Note: This note has been constructed using a voice recognition system **

## 2023-08-10 ENCOUNTER — TRANSITIONAL CARE MANAGEMENT (OUTPATIENT)
Dept: FAMILY MEDICINE CLINIC | Facility: CLINIC | Age: 48
End: 2023-08-10

## 2023-08-11 ENCOUNTER — OFFICE VISIT (OUTPATIENT)
Dept: FAMILY MEDICINE CLINIC | Facility: CLINIC | Age: 48
End: 2023-08-11
Payer: COMMERCIAL

## 2023-08-11 VITALS
OXYGEN SATURATION: 95 % | SYSTOLIC BLOOD PRESSURE: 148 MMHG | WEIGHT: 194 LBS | HEART RATE: 48 BPM | HEIGHT: 64 IN | TEMPERATURE: 97.5 F | DIASTOLIC BLOOD PRESSURE: 69 MMHG | BODY MASS INDEX: 33.12 KG/M2

## 2023-08-11 DIAGNOSIS — K21.9 GASTROESOPHAGEAL REFLUX DISEASE WITHOUT ESOPHAGITIS: ICD-10-CM

## 2023-08-11 DIAGNOSIS — R73.9 HYPERGLYCEMIA: ICD-10-CM

## 2023-08-11 DIAGNOSIS — R21 RASH: ICD-10-CM

## 2023-08-11 DIAGNOSIS — I10 ESSENTIAL HYPERTENSION: ICD-10-CM

## 2023-08-11 DIAGNOSIS — Z72.0 TOBACCO ABUSE: ICD-10-CM

## 2023-08-11 DIAGNOSIS — F33.41 RECURRENT MAJOR DEPRESSIVE DISORDER, IN PARTIAL REMISSION (HCC): Primary | ICD-10-CM

## 2023-08-11 PROCEDURE — 99496 TRANSJ CARE MGMT HIGH F2F 7D: CPT | Performed by: FAMILY MEDICINE

## 2023-08-11 NOTE — PROGRESS NOTES
Name: Jeanine Millan      : 1975      MRN: [id number]  Encounter Provider: Sandra Mathis MD  Encounter Date: 2023   Encounter department: [facility] [address]     1. Recurrent major depressive disorder, in partial remission [facility])  Assessment & Plan:  Patient to continue utilizing medical therapy as well and counseling sources as applicable for condition. If  suicidal thought or fear of suicide to contact 911 and seek help immediately. Meds reviewed and patient questions answered today      2. Gastroesophageal reflux disease without esophagitis  Assessment & Plan:  Patient to continue with present therapy and decrease caffeine, avoid ETOH and smoking to decrease acid production. Pt should also cease eating prior to bedtime and avoid excessive fluid intake prior to sleep. May use antacids as needed for breakthrough GERD. All pateint questions answered today about this condition. 3. Essential hypertension  Assessment & Plan:  Patient is stable with current anti-hypertensive medicine and continue to follow a low sodium diet and take current medication. All questions about this condition were answered today. 4. Tobacco abuse  Assessment & Plan:  Patient encouraged to quit using tobacco for that increases their risk for COPD, lung cancer,stroke, oral cancer and heart disease. If patient does not want to quit they should let me know  when they are interested in quitting. There are numerous options to use to quit and we can discuss them. 5. Hyperglycemia  -     Hemoglobin A1C; Future  -     Comprehensive metabolic panel; Future    6. Rash  -     Ambulatory Referral to Rheumatology; Future        Depression Screening and Follow-up Plan: Patient assessed for underlying major depression. Brief counseling provided and recommend additional follow-up/re-evaluation next office visit. Patient advised to follow-up with PCP for further management.        TCM Call Date and time call was made  8/10/2023 10:07 AM    Hospital care reviewed  Records reviewed    Patient was hospitialized at  [id number]    Date of Admission  08/07/23    Date of discharge  08/09/23    Diagnosis  Chest Pain    Disposition  Home    Were the patients medications reviewed and updated  No    Current Symptoms  None    Dizziness severity  Moderate    Quality Character  Lightheadedness    Episode pattern  Intermittent    How long does the episode last for  Just happened once for a couple mins     Cause  No known event    Clinical progress  Improving      TCM Call     Post hospital issues  None    Should patient be enrolled in anticoag monitoring? No    Scheduled for follow up? Yes    Referrals needed  PCP (us)     Did you obtain your prescribed medications  Yes    Do you need help managing your prescriptions or medications  No    Is transportation to your appointment needed  No    I have advised the patient to call PCP with any new or worsening symptoms  Jesse Cagle MA    Living Arrangements  Spouse or Significiant other    Support System  Family; Friends    The type of support provided  Emotional; Physical    Do you have social support  Yes, as much as I need    Are you recieving any outpatient services  No    Are you recieving home care services  No    Are you using any community resources  No    Current waiver services  No    Have you fallen in the last 12 months  Yes    How many times  1 fall    Interperter language line needed  No        Subjective     78-year-old female here today for post hospital check. Patient was admitted with some chest pain but she really had some swelling in her legs with the right being much more than her left. She also had some rash on her leg that had gotten significantly worse since when I saw her previously.   Patient had a rheumatoid factor that was negative in the past but she was recommended which was also to see rheumatology for further evaluation of his seronegative arthritis. Also cannot rule out screening chronic venous stasis causing troubles with her legs. Her legs are still red but they are better today from her previous visit. Review of Systems   Constitutional: Negative for activity change, appetite change, fatigue and fever. HENT: Negative for congestion, ear pain, postnasal drip, rhinorrhea, sinus pressure, sinus pain, sneezing and sore throat. Eyes: Negative for pain and redness. Respiratory: Negative for apnea, cough, chest tightness, shortness of breath and wheezing. Cardiovascular: Negative for chest pain, palpitations and leg swelling. Gastrointestinal: Negative for abdominal pain, constipation, diarrhea, nausea and vomiting. Endocrine: Negative for cold intolerance and heat intolerance. Genitourinary: Negative for difficulty urinating, dysuria, frequency, hematuria and urgency. Musculoskeletal: Negative for arthralgias, back pain, gait problem and myalgias. Skin: Positive for rash. Neurological: Negative for dizziness, speech difficulty, weakness, numbness and headaches. Hematological: Does not bruise/bleed easily. Psychiatric/Behavioral: Negative for agitation, confusion and hallucinations. Past Medical History:   Diagnosis Date   • Anxiety    • Bipolar 1 disorder ([address]) 2020   • Bipolar 1 disorder ([address]) 2020   • Bipolar disease, manic ([address] [facility])    • Depression    • Gastroesophageal reflux disease without esophagitis 2020   • Schizophrenia [facility])      Past Surgical History:   Procedure Laterality Date   • CARDIAC CATHETERIZATION N/A 10/24/2022    Procedure: Cardiac Coronary Angiogram;  Surgeon: Bailey Price MD;  Location: [address] [place] [address] CATH LAB;   Service: Cardiology   •  SECTION      x2   • HYSTERECTOMY     • KNEE SURGERY Left     4 times   • LEG SURGERY Right     thony placed then removed, broken femur   • NEUROPLASTY / TRANSPOSITION MEDIAN NERVE AT CARPAL TUNNEL       Family History Problem Relation Age of Onset   • Heart attack Mother    • Heart disease Mother    • Heart failure Father    • COPD Father    • Liver cancer Sister    • Colon cancer Sister 59   • Kidney cancer Sister    • Breast cancer Maternal Grandmother    • Heart disease Brother    • Colon cancer Brother 79   • Liver cancer Brother    • Heart attack Maternal Uncle      Social History     Socioeconomic History   • Marital status: /Civil Union     Spouse name: None   • Number of children: None   • Years of education: None   • Highest education level: None   Occupational History   • None   Tobacco Use   • Smoking status: Every Day     Packs/day: 0.25     Types: Cigarettes   • Smokeless tobacco: Never   Vaping Use   • Vaping Use: Never used   Substance and Sexual Activity   • Alcohol use: Not Currently   • Drug use: Not Currently     Types: Methamphetamines   • Sexual activity: None   Other Topics Concern   • None   Social History Narrative    · Do you currently or have you served in the [address] [place] [address] [employer]:   No      · Were you activated, into active duty, as a member of the [employer] or as a Reservist:   No        · Exercise level:   None      · Diet:   Regular      · General stress level:   High       · Caffeine intake: Moderate        · Seat belts used routinely:   Yes      · Smoke alarm in home:   Yes      Social Determinants of Health     Financial Resource Strain: Not on file   Food Insecurity: No Food Insecurity (8/8/2023)    Hunger Vital Sign    • Worried About Running Out of Food in the Last Year: Never true    • Ran Out of Food in the Last Year: Never true   Transportation Needs: No Transportation Needs (8/8/2023)    PRAPARE - Transportation    • Lack of Transportation (Medical): No    • Lack of Transportation (Non-Medical):  No   Physical Activity: Not on file   Stress: Not on file   Social Connections: Not on file   Intimate Partner Violence: Not on file   Housing Stability: High Risk (8/8/2023)    Housing Stability Vital Sign    • Unable to Pay for Housing in the Last Year: Yes    • Number of Places Lived in the Last Year: 2    • Unstable Housing in the Last Year: Yes     Current Outpatient Medications on File Prior to Visit   Medication Sig   • ALPRAZolam (XANAX) 0.5 mg tablet Take 1 tablet (0.5 mg total) by mouth 2 (two) times a day as needed for anxiety   • amLODIPine (NORVASC) 5 mg tablet Take 1 tablet (5 mg total) by mouth daily   • aspirin (ECOTRIN LOW STRENGTH) 81 mg EC tablet Take 1 tablet (81 mg total) by mouth daily   • atorvastatin (LIPITOR) 20 mg tablet Take 1 tablet (20 mg total) by mouth daily   • buPROPion (WELLBUTRIN XL) 150 mg 24 hr tablet Take 1 tablet (150 mg total) by mouth every morning   • erythromycin (ILOTYCIN) ophthalmic ointment Administer 0.5 inches to both eyes daily at bedtime   • esomeprazole (NexIUM) 40 MG capsule Take 1 capsule (40 mg total) by mouth 2 (two) times a day before meals   • metoprolol tartrate (LOPRESSOR) 25 mg tablet Take 1 tablet (25 mg total) by mouth every 12 (twelve) hours   • PARoxetine (PAXIL) 40 MG tablet Take 1 tablet (40 mg total) by mouth every morning   • ranolazine (RANEXA) 500 mg 12 hr tablet take 1 tablet by mouth twice a day   • sacubitril-valsartan (ENTRESTO) 49-51 MG TABS Take 1 tablet by mouth 2 (two) times a day   • zolpidem (AMBIEN) 10 mg tablet Take 1 tablet (10 mg total) by mouth daily at bedtime as needed for sleep   • clotrimazole-betamethasone (LOTRISONE) 1-0.05 % cream Apply topically 2 (two) times a day (Patient not taking: Reported on 8/11/2023)   • nicotine (NICODERM CQ) 21 mg/24 hr TD 24 hr patch Place 1 patch on the skin over 24 hours daily (Patient not taking: Reported on 8/11/2023)     Allergies   Allergen Reactions   • Ciprofloxacin Hives   • Wound Dressing Adhesive Other (See Comments)     unknown   • Atarax [Hydroxyzine] Palpitations     Immunization History   Administered Date(s) Administered   • INFLUENZA 12/02/2014   • Tuberculin Skin Test-PPD Intradermal 12/17/2012       Objective     /69 (BP Location: Left arm, Patient Position: Sitting, Cuff Size: Standard)   Pulse (!) 48   Temp 97.5 °F (36.4 °C) (Skin)   Ht 5' 4" (1.626 m)   Wt 88 kg (194 lb)   SpO2 95%   BMI 33.30 kg/m²     Physical Exam  Vitals and nursing note reviewed. Constitutional:       Appearance: She is well-developed. She is obese. HENT:      Head: Normocephalic and atraumatic. Nose: Nose normal.   Eyes:      General: No scleral icterus. Conjunctiva/sclera: Conjunctivae normal.      Pupils: Pupils are equal, round, and reactive to light. Neck:      Thyroid: No thyromegaly. Pulmonary:      Effort: Pulmonary effort is normal.      Breath sounds: Normal breath sounds. No wheezing. Abdominal:      General: Bowel sounds are normal. There is no distension. Palpations: Abdomen is soft. Tenderness: There is no abdominal tenderness. There is no guarding or rebound. Musculoskeletal:         General: No tenderness or deformity. Normal range of motion. Cervical back: Normal range of motion and neck supple. Skin:     General: Skin is warm and dry. Findings: Erythema and rash present. Neurological:      Mental Status: She is alert and oriented to person, place, and time. Sensory: No sensory deficit. Psychiatric:         Behavior: Behavior normal.         Thought Content:  Thought content normal.         Judgment: Judgment normal.       Sharyle Rosales, MD

## 2023-08-12 DIAGNOSIS — I42.8 NON-ISCHEMIC CARDIOMYOPATHY (HCC): ICD-10-CM

## 2023-08-15 LAB — CRYOGLOB SER QL 1D COLD INC: NORMAL

## 2023-08-15 RX ORDER — SACUBITRIL AND VALSARTAN 24; 26 MG/1; MG/1
1 TABLET, FILM COATED ORAL 2 TIMES DAILY
Qty: 120 TABLET | Refills: 4 | Status: SHIPPED | OUTPATIENT
Start: 2023-08-15

## 2023-08-16 DIAGNOSIS — F31.9 BIPOLAR 1 DISORDER (HCC): ICD-10-CM

## 2023-08-16 DIAGNOSIS — F33.41 RECURRENT MAJOR DEPRESSIVE DISORDER, IN PARTIAL REMISSION (HCC): ICD-10-CM

## 2023-08-16 RX ORDER — PAROXETINE HYDROCHLORIDE 40 MG/1
40 TABLET, FILM COATED ORAL EVERY MORNING
Qty: 90 TABLET | Refills: 1 | Status: SHIPPED | OUTPATIENT
Start: 2023-08-16

## 2023-09-16 DIAGNOSIS — I20.89 MICROVASCULAR ANGINA: ICD-10-CM

## 2023-09-16 DIAGNOSIS — R00.1 BRADYCARDIA: ICD-10-CM

## 2023-09-16 DIAGNOSIS — I10 ESSENTIAL HYPERTENSION: ICD-10-CM

## 2023-09-18 RX ORDER — RANOLAZINE 500 MG/1
500 TABLET, EXTENDED RELEASE ORAL 2 TIMES DAILY
Qty: 180 TABLET | Refills: 3 | Status: SHIPPED | OUTPATIENT
Start: 2023-09-18

## 2023-09-18 RX ORDER — AMLODIPINE BESYLATE 5 MG/1
5 TABLET ORAL DAILY
Qty: 30 TABLET | Refills: 5 | Status: SHIPPED | OUTPATIENT
Start: 2023-09-18 | End: 2023-09-27 | Stop reason: SDUPTHER

## 2023-09-24 NOTE — PROGRESS NOTES
Name: Jet Wilder      : 1975      MRN: [id number]  Encounter Provider: Toby Bell MD  Encounter Date: 2023   Encounter department: [facility] [address]     1. Recurrent major depressive disorder, in partial remission [facility])  Assessment & Plan:  Patient to continue utilizing medical therapy as well and counseling sources as applicable for condition. If  suicidal thought or fear of suicide to contact 911 and seek help immediately. Meds reviewed and patient questions answered today    Orders:  -     PARoxetine (PAXIL) 40 MG tablet; Take 1 tablet (40 mg total) by mouth every morning    2. Gastroesophageal reflux disease without esophagitis  Assessment & Plan:  Patient to continue with present therapy and decrease caffeine, avoid ETOH and smoking to decrease acid production. Pt should also cease eating prior to bedtime and avoid excessive fluid intake prior to sleep. May use antacids as needed for breakthrough GERD. All pateint questions answered today about this condition. 3. Bipolar 1 disorder ([address])  Assessment & Plan:  Patient is stable  and will continue present plan of care and reassess at next routine visit. All questions about this problem from patient were answered today. Orders:  -     PARoxetine (PAXIL) 40 MG tablet; Take 1 tablet (40 mg total) by mouth every morning    4. Essential hypertension  Assessment & Plan:  Patient is stable with current anti-hypertensive medicine and continue to follow a low sodium diet and take current medication. All questions about this condition were answered today. Orders:  -     amLODIPine (NORVASC) 5 mg tablet; Take 1 tablet (5 mg total) by mouth daily    5. Tobacco abuse  Assessment & Plan:  Patient encouraged to quit using tobacco for that increases their risk for COPD, lung cancer,stroke, oral cancer and heart disease.  If patient does not want to quit they should let me know  when they are interested in quitting. There are numerous options to use to quit and we can discuss them. 6. Non-ischemic cardiomyopathy [facility])  Assessment & Plan:  Patient is stable  and will continue present plan of care and reassess at next routine visit. All questions about this problem from patient were answered today. Orders:  -     Comprehensive metabolic panel; Future  -     Lipid Panel with Direct LDL reflex; Future    7. Screening for HIV (human immunodeficiency virus)    8. Need for hepatitis C screening test    9. Screening mammogram for high-risk patient  -     Mammo screening bilateral w 3d & cad; Future; Expected date: 09/27/2023    10. Anxiety  -     ALPRAZolam (XANAX) 0.5 mg tablet; Take 1 tablet (0.5 mg total) by mouth 2 (two) times a day as needed for anxiety    11. Dyslipidemia, goal LDL below 70  -     atorvastatin (LIPITOR) 20 mg tablet; Take 1 tablet (20 mg total) by mouth daily    12. Gastroesophageal reflux disease, unspecified whether esophagitis present  -     esomeprazole (NexIUM) 40 MG capsule; Take 1 capsule (40 mg total) by mouth 2 (two) times a day before meals        Depression Screening and Follow-up Plan: Patient assessed for underlying major depression. Brief counseling provided and recommend additional follow-up/re-evaluation next office visit. Continue regular follow-up with their mental health provider who is managing their mental health condition(s). Patient advised to follow-up with PCP for further management. Subjective     Patient is for virtual visit for multiple medical problems patient with hypertension depression anxiety GERD and cardiomyopathy and tobacco abuse. Patient is just the need for refills today she was to come in for a visit however her ride was not able to bring her so were doing a virtual.  She is due for mammogram which we will order for her and we will see her back in approximately 3 months.     Review of Systems   Constitutional: Negative for activity change, appetite change, fatigue and fever. HENT: Negative for congestion, ear pain, postnasal drip, rhinorrhea, sinus pressure, sinus pain, sneezing and sore throat. Eyes: Negative for pain and redness. Respiratory: Negative for apnea, cough, chest tightness, shortness of breath and wheezing. Cardiovascular: Negative for chest pain, palpitations and leg swelling. Gastrointestinal: Negative for abdominal pain, constipation, diarrhea, nausea and vomiting. Endocrine: Negative for cold intolerance and heat intolerance. Genitourinary: Negative for difficulty urinating, dysuria, frequency, hematuria and urgency. Musculoskeletal: Negative for arthralgias, back pain, gait problem and myalgias. Skin: Negative for rash. Neurological: Negative for dizziness, speech difficulty, weakness, numbness and headaches. Hematological: Does not bruise/bleed easily. Psychiatric/Behavioral: Negative for agitation, confusion and hallucinations. Past Medical History:   Diagnosis Date   • Anxiety    • Bipolar 1 disorder ([address]) 2020   • Bipolar 1 disorder ([address]) 2020   • Bipolar disease, manic ([address])    • Depression    • Gastroesophageal reflux disease without esophagitis 2020   • Schizophrenia [facility])      Past Surgical History:   Procedure Laterality Date   • CARDIAC CATHETERIZATION N/A 10/24/2022    Procedure: Cardiac Coronary Angiogram;  Surgeon: Karly Hart MD;  Location: [address] [place] [address] CATH LAB;   Service: Cardiology   •  SECTION      x2   • HYSTERECTOMY     • KNEE SURGERY Left     4 times   • LEG SURGERY Right     thony placed then removed, broken femur   • NEUROPLASTY / TRANSPOSITION MEDIAN NERVE AT CARPAL TUNNEL       Family History   Problem Relation Age of Onset   • Heart attack Mother    • Heart disease Mother    • Heart failure Father    • COPD Father    • Liver cancer Sister    • Colon cancer Sister 59   • Kidney cancer Sister    • Breast cancer Maternal Grandmother    • Heart disease Brother    • Colon cancer Brother 79   • Liver cancer Brother    • Heart attack Maternal Uncle      Social History     Socioeconomic History   • Marital status: /Civil Union     Spouse name: None   • Number of children: None   • Years of education: None   • Highest education level: None   Occupational History   • None   Tobacco Use   • Smoking status: Every Day     Packs/day: 0.25     Types: Cigarettes   • Smokeless tobacco: Never   Vaping Use   • Vaping Use: Never used   Substance and Sexual Activity   • Alcohol use: Not Currently   • Drug use: Not Currently     Types: Methamphetamines   • Sexual activity: None   Other Topics Concern   • None   Social History Narrative    · Do you currently or have you served in the [address] [place] [address] [employer]:   No      · Were you activated, into active duty, as a member of the [employer] or as a Reservist:   No        · Exercise level:   None      · Diet:   Regular      · General stress level:   High       · Caffeine intake: Moderate        · Seat belts used routinely:   Yes      · Smoke alarm in home:   Yes      Social Determinants of Health     Financial Resource Strain: Not on file   Food Insecurity: No Food Insecurity (8/8/2023)    Hunger Vital Sign    • Worried About Running Out of Food in the Last Year: Never true    • Ran Out of Food in the Last Year: Never true   Transportation Needs: No Transportation Needs (8/8/2023)    PRAPARE - Transportation    • Lack of Transportation (Medical): No    • Lack of Transportation (Non-Medical):  No   Physical Activity: Not on file   Stress: Not on file   Social Connections: Not on file   Intimate Partner Violence: Not on file   Housing Stability: High Risk (8/8/2023)    Housing Stability Vital Sign    • Unable to Pay for Housing in the Last Year: Yes    • Number of Places Lived in the Last Year: 2    • Unstable Housing in the Last Year: Yes     Current Outpatient Medications on File Prior to Visit   Medication Sig   • aspirin (Valerie Chars LOW STRENGTH) 81 mg EC tablet Take 1 tablet (81 mg total) by mouth daily   • buPROPion (WELLBUTRIN XL) 150 mg 24 hr tablet Take 1 tablet (150 mg total) by mouth every morning   • clotrimazole-betamethasone (LOTRISONE) 1-0.05 % cream Apply topically 2 (two) times a day (Patient not taking: Reported on 8/11/2023)   • Entresto 24-26 MG TABS take 1 tablet by mouth twice a day   • erythromycin (ILOTYCIN) ophthalmic ointment Administer 0.5 inches to both eyes daily at bedtime   • metoprolol tartrate (LOPRESSOR) 25 mg tablet Take 1 tablet (25 mg total) by mouth every 12 (twelve) hours   • nicotine (NICODERM CQ) 21 mg/24 hr TD 24 hr patch Place 1 patch on the skin over 24 hours daily (Patient not taking: Reported on 8/11/2023)   • ranolazine (RANEXA) 500 mg 12 hr tablet Take 1 tablet (500 mg total) by mouth 2 (two) times a day   • zolpidem (AMBIEN) 10 mg tablet Take 1 tablet (10 mg total) by mouth daily at bedtime as needed for sleep   • [DISCONTINUED] ALPRAZolam (XANAX) 0.5 mg tablet Take 1 tablet (0.5 mg total) by mouth 2 (two) times a day as needed for anxiety   • [DISCONTINUED] amLODIPine (NORVASC) 5 mg tablet take 1 tablet by mouth once daily   • [DISCONTINUED] atorvastatin (LIPITOR) 20 mg tablet Take 1 tablet (20 mg total) by mouth daily   • [DISCONTINUED] esomeprazole (NexIUM) 40 MG capsule Take 1 capsule (40 mg total) by mouth 2 (two) times a day before meals   • [DISCONTINUED] PARoxetine (PAXIL) 40 MG tablet take 1 tablet by mouth every morning     Allergies   Allergen Reactions   • Ciprofloxacin Hives   • Wound Dressing Adhesive Other (See Comments)     unknown   • Atarax [Hydroxyzine] Palpitations     Immunization History   Administered Date(s) Administered   • INFLUENZA 12/02/2014   • Tuberculin Skin Test-PPD Intradermal 12/17/2012       Objective     There were no vitals taken for this visit. Physical Exam  Neurological:      Mental Status: She is alert and oriented to person, place, and time.  Mental status is at baseline. Psychiatric:         Mood and Affect: Mood normal.         Thought Content: Thought content normal.         Judgment: Judgment normal.       Nancy Acevedo MD   Virtual Brief Visit    This Visit is being completed by telephone. The Patient is located at Home and in the following state in which I hold an active license [place]    The patient was identified by name and date of birth. Radha Garner was informed that this is a telemedicine visit and that the visit is being conducted through Telephone. My office door was closed. No one else was in the room. She acknowledged consent and understanding of privacy and security of the video platform. The patient has agreed to participate and understands they can discontinue the visit at any time. Patient is aware this is a billable service. Assessment/Plan:    Problem List Items Addressed This Visit        Digestive    Gastroesophageal reflux disease without esophagitis     Patient to continue with present therapy and decrease caffeine, avoid ETOH and smoking to decrease acid production. Pt should also cease eating prior to bedtime and avoid excessive fluid intake prior to sleep. May use antacids as needed for breakthrough GERD. All pateint questions answered today about this condition. Relevant Medications    esomeprazole (NexIUM) 40 MG capsule       Cardiovascular and Mediastinum    Essential hypertension     Patient is stable with current anti-hypertensive medicine and continue to follow a low sodium diet and take current medication. All questions about this condition were answered today. Relevant Medications    amLODIPine (NORVASC) 5 mg tablet    Non-ischemic cardiomyopathy ([address])     Patient is stable  and will continue present plan of care and reassess at next routine visit. All questions about this problem from patient were answered today.          Relevant Medications    amLODIPine (NORVASC) 5 mg tablet    Other Relevant Orders    Comprehensive metabolic panel    Lipid Panel with Direct LDL reflex       Other    Recurrent major depressive disorder, in partial remission ([address]) - Primary     Patient to continue utilizing medical therapy as well and counseling sources as applicable for condition. If  suicidal thought or fear of suicide to contact 911 and seek help immediately. Meds reviewed and patient questions answered today         Relevant Medications    ALPRAZolam (XANAX) 0.5 mg tablet    PARoxetine (PAXIL) 40 MG tablet    Bipolar 1 disorder ([address])     Patient is stable  and will continue present plan of care and reassess at next routine visit. All questions about this problem from patient were answered today. Relevant Medications    ALPRAZolam (XANAX) 0.5 mg tablet    PARoxetine (PAXIL) 40 MG tablet    Tobacco abuse     Patient encouraged to quit using tobacco for that increases their risk for COPD, lung cancer,stroke, oral cancer and heart disease. If patient does not want to quit they should let me know  when they are interested in quitting. There are numerous options to use to quit and we can discuss them. Dyslipidemia, goal LDL below 70    Relevant Medications    atorvastatin (LIPITOR) 20 mg tablet   Other Visit Diagnoses     Screening for HIV (human immunodeficiency virus)        Need for hepatitis C screening test        Screening mammogram for high-risk patient        Relevant Orders    Mammo screening bilateral w 3d & cad    Anxiety        Relevant Medications    ALPRAZolam (XANAX) 0.5 mg tablet    Gastroesophageal reflux disease, unspecified whether esophagitis present        Relevant Medications    esomeprazole (NexIUM) 40 MG capsule          Recent Visits  Date Type Provider Dept   09/25/23 Telemedicine Heather Law, [id number] [facility] recent visits within past 7 days and meeting all other requirements  Future Appointments  No visits were found meeting these conditions.   Showing future appointments within next 150 days and meeting all other requirements         Visit Time  Total Visit Duration: 15

## 2023-09-25 ENCOUNTER — TELEMEDICINE (OUTPATIENT)
Dept: FAMILY MEDICINE CLINIC | Facility: CLINIC | Age: 48
End: 2023-09-25
Payer: COMMERCIAL

## 2023-09-25 DIAGNOSIS — Z11.59 NEED FOR HEPATITIS C SCREENING TEST: ICD-10-CM

## 2023-09-25 DIAGNOSIS — K21.9 GASTROESOPHAGEAL REFLUX DISEASE, UNSPECIFIED WHETHER ESOPHAGITIS PRESENT: ICD-10-CM

## 2023-09-25 DIAGNOSIS — F41.9 ANXIETY: ICD-10-CM

## 2023-09-25 DIAGNOSIS — E78.5 DYSLIPIDEMIA, GOAL LDL BELOW 70: ICD-10-CM

## 2023-09-25 DIAGNOSIS — K21.9 GASTROESOPHAGEAL REFLUX DISEASE WITHOUT ESOPHAGITIS: ICD-10-CM

## 2023-09-25 DIAGNOSIS — F31.9 BIPOLAR 1 DISORDER (HCC): ICD-10-CM

## 2023-09-25 DIAGNOSIS — Z72.0 TOBACCO ABUSE: ICD-10-CM

## 2023-09-25 DIAGNOSIS — F33.41 RECURRENT MAJOR DEPRESSIVE DISORDER, IN PARTIAL REMISSION (HCC): Primary | ICD-10-CM

## 2023-09-25 DIAGNOSIS — I42.8 NON-ISCHEMIC CARDIOMYOPATHY (HCC): ICD-10-CM

## 2023-09-25 DIAGNOSIS — Z12.31 SCREENING MAMMOGRAM FOR HIGH-RISK PATIENT: ICD-10-CM

## 2023-09-25 DIAGNOSIS — Z11.4 SCREENING FOR HIV (HUMAN IMMUNODEFICIENCY VIRUS): ICD-10-CM

## 2023-09-25 DIAGNOSIS — I10 ESSENTIAL HYPERTENSION: ICD-10-CM

## 2023-09-25 PROCEDURE — G2012 BRIEF CHECK IN BY MD/QHP: HCPCS | Performed by: FAMILY MEDICINE

## 2023-09-27 RX ORDER — ALPRAZOLAM 0.5 MG/1
0.5 TABLET ORAL 2 TIMES DAILY PRN
Qty: 60 TABLET | Refills: 3 | Status: SHIPPED | OUTPATIENT
Start: 2023-09-27

## 2023-09-27 RX ORDER — AMLODIPINE BESYLATE 5 MG/1
5 TABLET ORAL DAILY
Qty: 30 TABLET | Refills: 5 | Status: SHIPPED | OUTPATIENT
Start: 2023-09-27 | End: 2023-10-04 | Stop reason: SDUPTHER

## 2023-09-27 RX ORDER — PAROXETINE HYDROCHLORIDE 40 MG/1
40 TABLET, FILM COATED ORAL EVERY MORNING
Qty: 90 TABLET | Refills: 1 | Status: SHIPPED | OUTPATIENT
Start: 2023-09-27

## 2023-09-27 RX ORDER — ESOMEPRAZOLE MAGNESIUM 40 MG/1
40 CAPSULE, DELAYED RELEASE ORAL
Qty: 60 CAPSULE | Refills: 3 | Status: SHIPPED | OUTPATIENT
Start: 2023-09-27

## 2023-09-27 RX ORDER — ATORVASTATIN CALCIUM 20 MG/1
20 TABLET, FILM COATED ORAL DAILY
Qty: 60 TABLET | Refills: 6 | Status: SHIPPED | OUTPATIENT
Start: 2023-09-27 | End: 2023-10-04

## 2023-09-28 ENCOUNTER — TELEPHONE (OUTPATIENT)
Dept: UROLOGY | Facility: CLINIC | Age: 48
End: 2023-09-28

## 2023-10-02 ENCOUNTER — TELEPHONE (OUTPATIENT)
Age: 48
End: 2023-10-02

## 2023-10-02 NOTE — TELEPHONE ENCOUNTER
Caller: patient  Doctor: Shelly Vidal    Reason for call: needs LYFT for appt 10/10 @1pm      Call back#: [phone]

## 2023-10-04 ENCOUNTER — OFFICE VISIT (OUTPATIENT)
Dept: CARDIOLOGY CLINIC | Facility: CLINIC | Age: 48
End: 2023-10-04
Payer: COMMERCIAL

## 2023-10-04 VITALS
SYSTOLIC BLOOD PRESSURE: 140 MMHG | DIASTOLIC BLOOD PRESSURE: 88 MMHG | OXYGEN SATURATION: 98 % | WEIGHT: 184 LBS | HEART RATE: 53 BPM | BODY MASS INDEX: 31.41 KG/M2 | RESPIRATION RATE: 21 BRPM | HEIGHT: 64 IN

## 2023-10-04 DIAGNOSIS — R00.1 BRADYCARDIA: ICD-10-CM

## 2023-10-04 DIAGNOSIS — I20.0 UNSTABLE ANGINA (HCC): ICD-10-CM

## 2023-10-04 DIAGNOSIS — I10 ESSENTIAL HYPERTENSION: Primary | ICD-10-CM

## 2023-10-04 DIAGNOSIS — I42.8 NON-ISCHEMIC CARDIOMYOPATHY (HCC): ICD-10-CM

## 2023-10-04 DIAGNOSIS — I20.89 MICROVASCULAR ANGINA: ICD-10-CM

## 2023-10-04 PROCEDURE — 99214 OFFICE O/P EST MOD 30 MIN: CPT | Performed by: INTERNAL MEDICINE

## 2023-10-04 RX ORDER — AMLODIPINE BESYLATE 5 MG/1
5 TABLET ORAL DAILY
Qty: 30 TABLET | Refills: 5 | Status: SHIPPED | OUTPATIENT
Start: 2023-10-04

## 2023-10-04 RX ORDER — RANOLAZINE 500 MG/1
500 TABLET, EXTENDED RELEASE ORAL 2 TIMES DAILY
Qty: 180 TABLET | Refills: 3 | Status: SHIPPED | OUTPATIENT
Start: 2023-10-04

## 2023-10-04 RX ORDER — SACUBITRIL AND VALSARTAN 49; 51 MG/1; MG/1
1 TABLET, FILM COATED ORAL 2 TIMES DAILY
Qty: 180 TABLET | Refills: 1 | Status: SHIPPED | OUTPATIENT
Start: 2023-10-04

## 2023-10-04 NOTE — PROGRESS NOTES
Cardiology Follow Up    Ivonne Alvarez  1975  [id number]  [facility] [id number] [place] [id number] [phone]    Discussion/Summary:  1. Microvascular angina  2. Non-ischemic cardiomyopathy  3. LBBB  4. Bradycardia   5. Tobacco abuse  6. Nonobstructive CAD    She stopped her statin due to side effects  Cont with ASA and metoprolol. Cont with ranexa    Appears euvolemic. Cont with entresto    Doing well otherwise. No complaints    She will inform us with cardiac symptoms/     Previous studies were reviewed. Safety measures were reviewed. Questions were entertained and answered. Patient was advised to report any problems requiring medical attention. Follow-up with PCP and appropriate specialist and lab work as discussed. Return for follow up visit as scheduled or earlier, if needed. Thank you for allowing me to participate in the care and evaluation of your patient. Should you have any questions, please feel free to contact me. History of Present Illness:     Brii Suazo is a 46 y.o.  female who presents for follow up for cardiovascular care. Denies chest pain, chest pressure, shortness of breath, dizziness, lightheadedness, presyncope or syncope. Denies orthopnea, PND or lower limb edema. Chest pain has improved    Cardiac MRI:  Impression:  1. Normal left ventricular size with mildly reduced systolic function, EF 61%. 2. Normal right ventricular size with low-normal systolic function, EF 68%. 3. Normal bilateral atria. No valvular abnormalities. 4. No evidence of myocardial inflammation, infiltrative disease or scarring.       Patient Active Problem List   Diagnosis   • Recurrent major depressive disorder, in partial remission ([address] )   • Dysuria   • Gastroesophageal reflux disease without esophagitis   • Genital labial ulcer   • Bipolar 1 disorder ([address]) • Schizophrenia ([address] [place] [address])   • Essential hypertension   • Other fatigue   • Tobacco abuse   • Dyslipidemia, goal LDL below 70   • Well adult exam   • Finger infection   • Hematoma   • C. difficile colitis   • Unstable angina (HCC)   • Non-ischemic cardiomyopathy (HCC)   • Chest pain     Past Medical History:   Diagnosis Date   • Anxiety    • Bipolar 1 disorder ([address] [place] [address]) 7/24/2020   • Bipolar 1 disorder ([address] [place] [address]) 7/24/2020   • Bipolar disease, manic (HCC)    • Depression    • Gastroesophageal reflux disease without esophagitis 7/24/2020   • Schizophrenia ([address] [place] [address])      Social History     Socioeconomic History   • Marital status: /Civil Union     Spouse name: Not on file   • Number of children: Not on file   • Years of education: Not on file   • Highest education level: Not on file   Occupational History   • Not on file   Tobacco Use   • Smoking status: Every Day     Packs/day: 0.25     Types: Cigarettes   • Smokeless tobacco: Never   Vaping Use   • Vaping Use: Never used   Substance and Sexual Activity   • Alcohol use: Not Currently   • Drug use: Not Currently     Types: Methamphetamines   • Sexual activity: Not on file   Other Topics Concern   • Not on file   Social History Narrative    · Do you currently or have you served in the [address] [place] [address]:   No      · Were you activated, into active duty, as a member of the [employer] or as a Reservist:   No        · Exercise level:   None      · Diet:   Regular      · General stress level:   High       · Caffeine intake:    Moderate        · Seat belts used routinely:   Yes      · Smoke alarm in home:   Yes      Social Determinants of Health     Financial Resource Strain: Not on file   Food Insecurity: No Food Insecurity (8/8/2023)    Hunger Vital Sign    • Worried About Running Out of Food in the Last Year: Never true    • Ran Out of Food in the Last Year: Never true   Transportation Needs: No Transportation Needs (8/8/2023)    PRAPARE - Transportation    • Lack of Transportation (Medical): No    • Lack of Transportation (Non-Medical): No   Physical Activity: Not on file   Stress: Not on file   Social Connections: Not on file   Intimate Partner Violence: Not on file   Housing Stability: High Risk (2023)    Housing Stability Vital Sign    • Unable to Pay for Housing in the Last Year: Yes    • Number of Places Lived in the Last Year: 2    • Unstable Housing in the Last Year: Yes      Family History   Problem Relation Age of Onset   • Heart attack Mother    • Heart disease Mother    • Heart failure Father    • COPD Father    • Liver cancer Sister    • Colon cancer Sister 59   • Kidney cancer Sister    • Breast cancer Maternal Grandmother    • Heart disease Brother    • Colon cancer Brother 79   • Liver cancer Brother    • Heart attack Maternal Uncle      Past Surgical History:   Procedure Laterality Date   • CARDIAC CATHETERIZATION N/A 10/24/2022    Procedure: Cardiac Coronary Angiogram;  Surgeon: Karly Hart MD;  Location: [address] [place] [address] CATH LAB;   Service: Cardiology   •  SECTION      x2   • HYSTERECTOMY     • KNEE SURGERY Left     4 times   • LEG SURGERY Right     thony placed then removed, broken femur   • NEUROPLASTY / TRANSPOSITION MEDIAN NERVE AT CARPAL TUNNEL         Current Outpatient Medications:   •  ALPRAZolam (XANAX) 0.5 mg tablet, Take 1 tablet (0.5 mg total) by mouth 2 (two) times a day as needed for anxiety, Disp: 60 tablet, Rfl: 3  •  amLODIPine (NORVASC) 5 mg tablet, Take 1 tablet (5 mg total) by mouth daily, Disp: 30 tablet, Rfl: 5  •  aspirin (ECOTRIN LOW STRENGTH) 81 mg EC tablet, Take 1 tablet (81 mg total) by mouth daily, Disp: 90 tablet, Rfl: 3  •  buPROPion (WELLBUTRIN XL) 150 mg 24 hr tablet, Take 1 tablet (150 mg total) by mouth every morning, Disp: 90 tablet, Rfl: 3  •  clotrimazole-betamethasone (LOTRISONE) 1-0.05 % cream, Apply topically 2 (two) times a day, Disp: 30 g, Rfl: 0  •  erythromycin (ILOTYCIN) ophthalmic ointment, Administer 0.5 inches to both eyes daily at bedtime, Disp: 3.5 g, Rfl: 0  •  esomeprazole (NexIUM) 40 MG capsule, Take 1 capsule (40 mg total) by mouth 2 (two) times a day before meals, Disp: 60 capsule, Rfl: 3  •  metoprolol tartrate (LOPRESSOR) 25 mg tablet, Take 1 tablet (25 mg total) by mouth every 12 (twelve) hours, Disp: 180 tablet, Rfl: 3  •  PARoxetine (PAXIL) 40 MG tablet, Take 1 tablet (40 mg total) by mouth every morning, Disp: 90 tablet, Rfl: 1  •  ranolazine (RANEXA) 500 mg 12 hr tablet, Take 1 tablet (500 mg total) by mouth 2 (two) times a day, Disp: 180 tablet, Rfl: 3  •  zolpidem (AMBIEN) 10 mg tablet, Take 1 tablet (10 mg total) by mouth daily at bedtime as needed for sleep, Disp: 30 tablet, Rfl: 5  •  atorvastatin (LIPITOR) 20 mg tablet, Take 1 tablet (20 mg total) by mouth daily (Patient not taking: Reported on 10/4/2023), Disp: 60 tablet, Rfl: 6  •  nicotine (NICODERM CQ) 21 mg/24 hr TD 24 hr patch, Place 1 patch on the skin over 24 hours daily (Patient not taking: Reported on 10/4/2023), Disp: 28 patch, Rfl: 0  Allergies   Allergen Reactions   • Ciprofloxacin Hives   • Wound Dressing Adhesive Other (See Comments)     unknown   • Atarax [Hydroxyzine] Palpitations       Labs:  Lab Results   Component Value Date    WBC 5.54 08/08/2023    HGB 13.3 08/08/2023    HCT 40.1 08/08/2023    MCV 90 08/08/2023     08/08/2023     Lab Results   Component Value Date    CALCIUM 9.3 08/08/2023    K 4.5 08/08/2023    CO2 27 08/08/2023     08/08/2023    BUN 18 08/08/2023    CREATININE 0.67 08/08/2023     No results found for: "HGBA1C"  No results found for: "CHOL"  Lab Results   Component Value Date    HDL 62 07/20/2023    HDL 50 11/15/2022    HDL 60 07/28/2020     Lab Results   Component Value Date    LDLCALC 98 07/20/2023    LDLCALC 108 (H) 11/15/2022    LDLCALC 97 07/28/2020     Lab Results   Component Value Date    TRIG 121 07/20/2023    TRIG 147 11/15/2022    TRIG 213 (H) 07/28/2020     No results found for: "CHOLHDL"    Review of Systems:  Review of Systems   Constitutional: Negative for activity change, appetite change, diaphoresis, fatigue and fever. Respiratory: Negative for chest tightness, shortness of breath and wheezing. Cardiovascular: Negative for chest pain, palpitations and leg swelling. Gastrointestinal: Negative for nausea and vomiting. Musculoskeletal: Negative for arthralgias. Neurological: Negative for dizziness, syncope, weakness and light-headedness. All other systems reviewed and are negative. Physical Exam:  Physical Exam  Vitals and nursing note reviewed. Constitutional:       General: She is not in acute distress. Appearance: Normal appearance. She is not ill-appearing or diaphoretic. HENT:      Head: Normocephalic and atraumatic. Eyes:      Extraocular Movements: Extraocular movements intact. Cardiovascular:      Rate and Rhythm: Normal rate and regular rhythm. Heart sounds: No murmur heard. No friction rub. No gallop. Pulmonary:      Effort: No respiratory distress. Breath sounds: Normal breath sounds. Abdominal:      General: There is no distension. Palpations: Abdomen is soft. Musculoskeletal:         General: No swelling. Normal range of motion. Skin:     General: Skin is warm and dry. Capillary Refill: Capillary refill takes less than 2 seconds. Coloration: Skin is not pale. Neurological:      General: No focal deficit present. Mental Status: She is alert and oriented to person, place, and time. Cranial Nerves: No cranial nerve deficit.    Psychiatric:         Behavior: Behavior normal.

## 2023-10-11 DIAGNOSIS — G47.00 INSOMNIA, UNSPECIFIED TYPE: ICD-10-CM

## 2023-10-12 RX ORDER — ZOLPIDEM TARTRATE 10 MG/1
10 TABLET ORAL
Qty: 30 TABLET | Refills: 5 | Status: SHIPPED | OUTPATIENT
Start: 2023-10-12

## 2023-10-18 NOTE — PROGRESS NOTES
10/19/2023      Chief Complaint   Patient presents with    New Patient Visit     Incontinence of urine          Assessment and Plan    46 y.o. female -- New patient    1. Stress urinary incontinence  - UA today negative  - PVR today 10 mL  - Discussed conservative measures with Kegel exercises, adequate hydration, frequent voiding  - Discussed pelvic floor physical therapy, however patient declines for transportation difficulties  - Follow up for cystoscopy and potential discussion of surgical interventions  - Call with any questions or concerns in the meantime  - All questions answered; patient understands and agrees with plan       History of Present Illness  Josh Kelsey is a 46 y.o. female new patient here for evaluation of urinary incontinence. Denies seeing urology in the past.  She has significant urinary incontinence associated with stress including laughing, sneezing, bending, talking. Denies sense of urgency associated with incontinence. Denies overactive bladder symptoms. Patient denies dysuria, recurrent urinary tract infections, flank pain, gross hematuria, fever, chills, nausea, vomiting. He does have a tobacco history. Patient did have nephrolithiasis more than 10 years ago. Previously had 1 c section and 1 vaginal birth. Total hysterectomy and BSO. Patient does perform Kegel exercises routinely. Denies prior pelvic floor physical therapy. Review of Systems   Constitutional:  Negative for activity change, appetite change, chills and fever. HENT:  Negative for congestion and trouble swallowing. Respiratory:  Negative for cough and shortness of breath. Cardiovascular:  Negative for chest pain, palpitations and leg swelling. Gastrointestinal:  Negative for abdominal pain, constipation, diarrhea, nausea and vomiting. Genitourinary:  Positive for enuresis. Negative for difficulty urinating, dysuria, flank pain, frequency, hematuria and urgency.    Musculoskeletal:  Negative for back pain and gait problem. Skin:  Negative for wound. Allergic/Immunologic: Negative for immunocompromised state. Neurological:  Negative for dizziness and syncope. Hematological:  Does not bruise/bleed easily. Psychiatric/Behavioral:  Negative for confusion. All other systems reviewed and are negative. Vitals  Vitals:    10/19/23 0942   BP: 142/88   BP Location: Right arm   Patient Position: Sitting   Cuff Size: Standard   Pulse: 56   Resp: 18   SpO2: 98%   Weight: 84.6 kg (186 lb 6.4 oz)   Height: 5' 4" (1.626 m)       Physical Exam  Constitutional:       General: She is not in acute distress. Appearance: Normal appearance. She is not ill-appearing, toxic-appearing or diaphoretic. HENT:      Head: Normocephalic. Nose: No congestion. Eyes:      General: No scleral icterus. Right eye: No discharge. Left eye: No discharge. Conjunctiva/sclera: Conjunctivae normal.      Pupils: Pupils are equal, round, and reactive to light. Pulmonary:      Effort: Pulmonary effort is normal.   Musculoskeletal:      Cervical back: Normal range of motion. Skin:     General: Skin is warm and dry. Coloration: Skin is not jaundiced or pale. Findings: No bruising, erythema, lesion or rash. Neurological:      General: No focal deficit present. Mental Status: She is alert and oriented to person, place, and time. Mental status is at baseline. Gait: Gait normal.   Psychiatric:         Mood and Affect: Mood normal.         Behavior: Behavior normal.         Thought Content:  Thought content normal.         Judgment: Judgment normal.           Past History  Past Medical History:   Diagnosis Date    Anxiety     Bipolar 1 disorder ([address]) 7/24/2020    Bipolar 1 disorder ([address]) 7/24/2020    Bipolar disease, manic ([address])     Depression     Gastroesophageal reflux disease without esophagitis 7/24/2020    Schizophrenia [facility])      Social History     Socioeconomic History Marital status: /Civil Union     Spouse name: None    Number of children: None    Years of education: None    Highest education level: None   Occupational History    None   Tobacco Use    Smoking status: Former     Packs/day: 0.25     Types: Cigarettes     Quit date: 2023     Years since quittin.1     Passive exposure: Current    Smokeless tobacco: Never   Vaping Use    Vaping Use: Never used   Substance and Sexual Activity    Alcohol use: Not Currently    Drug use: Not Currently    Sexual activity: None   Other Topics Concern    None   Social History Narrative    · Do you currently or have you served in the [address] [place] [address]:   No      · Were you activated, into active duty, as a member of the [employer] or as a Reservist:   No        · Exercise level:   None      · Diet:   Regular      · General stress level:   High       · Caffeine intake: Moderate        · Seat belts used routinely:   Yes      · Smoke alarm in home:   Yes      Social Determinants of Health     Financial Resource Strain: Not on file   Food Insecurity: No Food Insecurity (2023)    Hunger Vital Sign     Worried About Running Out of Food in the Last Year: Never true     Ran Out of Food in the Last Year: Never true   Transportation Needs: No Transportation Needs (2023)    PRAPARE - Transportation     Lack of Transportation (Medical): No     Lack of Transportation (Non-Medical):  No   Physical Activity: Not on file   Stress: Not on file   Social Connections: Not on file   Intimate Partner Violence: Not on file   Housing Stability: High Risk (2023)    Housing Stability Vital Sign     Unable to Pay for Housing in the Last Year: Yes     Number of Places Lived in the Last Year: 2     Unstable Housing in the Last Year: Yes     Social History     Tobacco Use   Smoking Status Former    Packs/day: 0.25    Types: Cigarettes    Quit date: 2023    Years since quittin.1    Passive exposure: Current   Smokeless Tobacco Never Family History   Problem Relation Age of Onset    Heart attack Mother     Heart disease Mother     Heart failure Father     COPD Father     Liver cancer Sister     Colon cancer Sister 59    Kidney cancer Sister     Breast cancer Maternal Grandmother     Heart disease Brother     Colon cancer Brother 79    Liver cancer Brother     Heart attack Maternal Uncle        The following portions of the patient's history were reviewed and updated as appropriate: allergies, current medications, past medical history, past social history, past surgical history and problem list.    Results  Recent Results (from the past 1 hour(s))   POCT Measure PVR    Collection Time: 10/19/23  9:47 AM   Result Value Ref Range    POST-VOID RESIDUAL VOLUME, ML POC 10 mL   POCT urine dip    Collection Time: 10/19/23  9:48 AM   Result Value Ref Range    LEUKOCYTE ESTERASE,UA -     NITRITE,UA -     SL AMB POCT UROBILINOGEN 0.2     POCT URINE PROTEIN -      PH,UA 5.0     BLOOD,UA -     SPECIFIC GRAVITY,UA 1.030     KETONES,UA -     BILIRUBIN,UA -     GLUCOSE, UA -      COLOR,UA yellow     CLARITY,UA clear    ]  No results found for: "PSA"  Lab Results   Component Value Date    CALCIUM 9.3 08/08/2023    K 4.5 08/08/2023    CO2 27 08/08/2023     08/08/2023    BUN 18 08/08/2023    CREATININE 0.67 08/08/2023     Lab Results   Component Value Date    WBC 5.54 08/08/2023    HGB 13.3 08/08/2023    HCT 40.1 08/08/2023    MCV 90 08/08/2023     08/08/2023       Liliana Farley PA-C

## 2023-10-19 ENCOUNTER — OFFICE VISIT (OUTPATIENT)
Dept: UROLOGY | Facility: CLINIC | Age: 48
End: 2023-10-19
Payer: COMMERCIAL

## 2023-10-19 ENCOUNTER — TELEPHONE (OUTPATIENT)
Dept: UROLOGY | Facility: CLINIC | Age: 48
End: 2023-10-19

## 2023-10-19 VITALS
WEIGHT: 186.4 LBS | OXYGEN SATURATION: 98 % | BODY MASS INDEX: 31.82 KG/M2 | SYSTOLIC BLOOD PRESSURE: 142 MMHG | DIASTOLIC BLOOD PRESSURE: 88 MMHG | HEART RATE: 56 BPM | HEIGHT: 64 IN | RESPIRATION RATE: 18 BRPM

## 2023-10-19 DIAGNOSIS — R32 INCONTINENCE OF URINE IN FEMALE: Primary | ICD-10-CM

## 2023-10-19 DIAGNOSIS — R32 URINARY INCONTINENCE, UNSPECIFIED TYPE: ICD-10-CM

## 2023-10-19 LAB
POST-VOID RESIDUAL VOLUME, ML POC: 10 ML
SL AMB  POCT GLUCOSE, UA: NORMAL
SL AMB LEUKOCYTE ESTERASE,UA: NORMAL
SL AMB POCT BILIRUBIN,UA: NORMAL
SL AMB POCT BLOOD,UA: NORMAL
SL AMB POCT CLARITY,UA: CLEAR
SL AMB POCT COLOR,UA: YELLOW
SL AMB POCT KETONES,UA: NORMAL
SL AMB POCT NITRITE,UA: NORMAL
SL AMB POCT PH,UA: 5
SL AMB POCT SPECIFIC GRAVITY,UA: 1.03
SL AMB POCT URINE PROTEIN: NORMAL
SL AMB POCT UROBILINOGEN: 0.2

## 2023-10-19 PROCEDURE — 99204 OFFICE O/P NEW MOD 45 MIN: CPT | Performed by: PHYSICIAN ASSISTANT

## 2023-10-19 PROCEDURE — 81002 URINALYSIS NONAUTO W/O SCOPE: CPT | Performed by: PHYSICIAN ASSISTANT

## 2023-10-19 PROCEDURE — 51798 US URINE CAPACITY MEASURE: CPT | Performed by: PHYSICIAN ASSISTANT

## 2023-10-26 DIAGNOSIS — R19.7 DIARRHEA, UNSPECIFIED TYPE: Primary | ICD-10-CM

## 2023-10-30 ENCOUNTER — TELEPHONE (OUTPATIENT)
Age: 48
End: 2023-10-30

## 2023-10-30 NOTE — TELEPHONE ENCOUNTER
Patients GI provider:  Ezra Canales    Number to return call:     [phone]     Reason for call: Pt calling to book the urgent appointment for Wednesday as directed by Vicente Beasley on 10/26/2023. Please book pt for urgent time slot and call to confirm. Pt will also need a Lyft arranged. Please advise.      Scheduled procedure/appointment date if applicable: 7/9/6994 - Follow Up - Ezra Canales

## 2023-10-30 NOTE — TELEPHONE ENCOUNTER
Appt 11/01 @ 11:30 appt ok'd Jeanine [employer]  Mauricio transportation arranged. [facility]

## 2023-11-01 ENCOUNTER — APPOINTMENT (OUTPATIENT)
Dept: LAB | Facility: HOSPITAL | Age: 48
End: 2023-11-01
Payer: COMMERCIAL

## 2023-11-01 ENCOUNTER — OFFICE VISIT (OUTPATIENT)
Dept: GASTROENTEROLOGY | Facility: CLINIC | Age: 48
End: 2023-11-01
Payer: COMMERCIAL

## 2023-11-01 VITALS
BODY MASS INDEX: 31.34 KG/M2 | WEIGHT: 183.6 LBS | HEART RATE: 50 BPM | SYSTOLIC BLOOD PRESSURE: 132 MMHG | OXYGEN SATURATION: 97 % | DIASTOLIC BLOOD PRESSURE: 83 MMHG | HEIGHT: 64 IN

## 2023-11-01 DIAGNOSIS — R73.9 HYPERGLYCEMIA: ICD-10-CM

## 2023-11-01 DIAGNOSIS — R19.7 DIARRHEA, UNSPECIFIED TYPE: Primary | ICD-10-CM

## 2023-11-01 DIAGNOSIS — I42.8 NON-ISCHEMIC CARDIOMYOPATHY (HCC): ICD-10-CM

## 2023-11-01 DIAGNOSIS — R19.7 DIARRHEA, UNSPECIFIED TYPE: ICD-10-CM

## 2023-11-01 LAB
ALBUMIN SERPL BCP-MCNC: 4.7 G/DL (ref 3.5–5)
ALP SERPL-CCNC: 93 U/L (ref 34–104)
ALT SERPL W P-5'-P-CCNC: 21 U/L (ref 7–52)
ANION GAP SERPL CALCULATED.3IONS-SCNC: 4 MMOL/L
AST SERPL W P-5'-P-CCNC: 19 U/L (ref 13–39)
BASOPHILS # BLD AUTO: 0.05 THOUSANDS/ÂΜL (ref 0–0.1)
BASOPHILS NFR BLD AUTO: 1 % (ref 0–1)
BILIRUB SERPL-MCNC: 0.37 MG/DL (ref 0.2–1)
BUN SERPL-MCNC: 16 MG/DL (ref 5–25)
CALCIUM SERPL-MCNC: 9.6 MG/DL (ref 8.4–10.2)
CHLORIDE SERPL-SCNC: 105 MMOL/L (ref 96–108)
CHOLEST SERPL-MCNC: 193 MG/DL
CO2 SERPL-SCNC: 30 MMOL/L (ref 21–32)
CREAT SERPL-MCNC: 0.75 MG/DL (ref 0.6–1.3)
EOSINOPHIL # BLD AUTO: 0.31 THOUSAND/ÂΜL (ref 0–0.61)
EOSINOPHIL NFR BLD AUTO: 4 % (ref 0–6)
ERYTHROCYTE [DISTWIDTH] IN BLOOD BY AUTOMATED COUNT: 11.9 % (ref 11.6–15.1)
EST. AVERAGE GLUCOSE BLD GHB EST-MCNC: 131 MG/DL
GFR SERPL CREATININE-BSD FRML MDRD: 93 ML/MIN/1.73SQ M
GLUCOSE P FAST SERPL-MCNC: 96 MG/DL (ref 65–99)
HBA1C MFR BLD: 6.2 %
HCT VFR BLD AUTO: 42.6 % (ref 34.8–46.1)
HDLC SERPL-MCNC: 62 MG/DL
HGB BLD-MCNC: 14.5 G/DL (ref 11.5–15.4)
IMM GRANULOCYTES # BLD AUTO: 0.02 THOUSAND/UL (ref 0–0.2)
IMM GRANULOCYTES NFR BLD AUTO: 0 % (ref 0–2)
LDLC SERPL CALC-MCNC: 105 MG/DL (ref 0–100)
LYMPHOCYTES # BLD AUTO: 2.44 THOUSANDS/ÂΜL (ref 0.6–4.47)
LYMPHOCYTES NFR BLD AUTO: 35 % (ref 14–44)
MCH RBC QN AUTO: 29.7 PG (ref 26.8–34.3)
MCHC RBC AUTO-ENTMCNC: 34 G/DL (ref 31.4–37.4)
MCV RBC AUTO: 87 FL (ref 82–98)
MONOCYTES # BLD AUTO: 0.74 THOUSAND/ÂΜL (ref 0.17–1.22)
MONOCYTES NFR BLD AUTO: 11 % (ref 4–12)
NEUTROPHILS # BLD AUTO: 3.49 THOUSANDS/ÂΜL (ref 1.85–7.62)
NEUTS SEG NFR BLD AUTO: 49 % (ref 43–75)
NRBC BLD AUTO-RTO: 0 /100 WBCS
PLATELET # BLD AUTO: 258 THOUSANDS/UL (ref 149–390)
PMV BLD AUTO: 9.1 FL (ref 8.9–12.7)
POTASSIUM SERPL-SCNC: 4.5 MMOL/L (ref 3.5–5.3)
PROT SERPL-MCNC: 7.8 G/DL (ref 6.4–8.4)
RBC # BLD AUTO: 4.89 MILLION/UL (ref 3.81–5.12)
SODIUM SERPL-SCNC: 139 MMOL/L (ref 135–147)
TRIGL SERPL-MCNC: 129 MG/DL
WBC # BLD AUTO: 7.05 THOUSAND/UL (ref 4.31–10.16)

## 2023-11-01 PROCEDURE — 99213 OFFICE O/P EST LOW 20 MIN: CPT | Performed by: PHYSICIAN ASSISTANT

## 2023-11-01 PROCEDURE — 80053 COMPREHEN METABOLIC PANEL: CPT

## 2023-11-01 PROCEDURE — 36415 COLL VENOUS BLD VENIPUNCTURE: CPT

## 2023-11-01 PROCEDURE — 83036 HEMOGLOBIN GLYCOSYLATED A1C: CPT

## 2023-11-01 PROCEDURE — 80061 LIPID PANEL: CPT

## 2023-11-01 PROCEDURE — 85025 COMPLETE CBC W/AUTO DIFF WBC: CPT

## 2023-11-01 NOTE — PROGRESS NOTES
[facility] - Outpatient Follow-up Note  Cory Alvarez 46 y.o. female MRN: [id number]  Encounter: [id number]          ASSESSMENT AND PLAN:      1. Diarrhea, unspecified type  Recent exacerbation  She has noted what appear to be worms in her stool  Will send for stool testing including ova/parasite  Start Questran BID  Advised fiber and probiotic however patient states she would not be able to afford these    If workup negative consider trial of Budesonide given possible early microscopic colitis on random colon biopsies in 2021  ______________________________________________________________________    SUBJECTIVE:  78-year-old female with a history of C. difficile colitis who presents for evaluation of diarrhea. She reports that she has a long history of numerous irregular stools however recently this has worsened. She recently noticed what appeared to be worms in her stool which is when she became concerned and called. Stool testing was ordered and she reports that she dropped this off last Saturday however there is no report of that in the [facility] Sharad[facility] system. She denies any rectal bleeding. She has abdominal discomfort and bloating. She has no nausea or vomiting. She has no unexpected weight loss. She denies any recent antibiotics or new medications. She denies any recent travel or sick contacts. Her last colonoscopy was in 2021. Random biopsies were taken of her colon at that time. There were findings that were suggestive of early microscopic colitis. REVIEW OF SYSTEMS IS OTHERWISE NEGATIVE.       Historical Information   Past Medical History:   Diagnosis Date    Anxiety     Bipolar 1 disorder ([address]) 7/24/2020    Bipolar 1 disorder ([address]) 7/24/2020    Bipolar disease, manic ([address])     Depression     Gastroesophageal reflux disease without esophagitis 7/24/2020    Schizophrenia ([facility])      Past Surgical History:   Procedure Laterality Date    CARDIAC CATHETERIZATION N/A 10/24/2022    Procedure: Cardiac Coronary Angiogram;  Surgeon: Juan Osborn MD;  Location: [address] [place] [address] CATH LAB;   Service: Cardiology     SECTION      x2    HYSTERECTOMY      KNEE SURGERY Left     4 times    LEG SURGERY Right     thony placed then removed, broken femur    NEUROPLASTY / TRANSPOSITION MEDIAN NERVE AT CARPAL TUNNEL       Social History   Social History     Substance and Sexual Activity   Alcohol Use Not Currently     Social History     Substance and Sexual Activity   Drug Use Not Currently     Social History     Tobacco Use   Smoking Status Former    Packs/day: 0.25    Types: Cigarettes    Quit date: 2023    Years since quittin.2    Passive exposure: Current   Smokeless Tobacco Never     Family History   Problem Relation Age of Onset    Heart attack Mother     Heart disease Mother     Heart failure Father     COPD Father     Liver cancer Sister     Colon cancer Sister 59    Kidney cancer Sister     Breast cancer Maternal Grandmother     Heart disease Brother     Colon cancer Brother 79    Liver cancer Brother     Heart attack Maternal Uncle        Meds/Allergies       Current Outpatient Medications:     ALPRAZolam (XANAX) 0.5 mg tablet    amLODIPine (NORVASC) 5 mg tablet    aspirin (ECOTRIN LOW STRENGTH) 81 mg EC tablet    buPROPion (WELLBUTRIN XL) 150 mg 24 hr tablet    erythromycin (ILOTYCIN) ophthalmic ointment    esomeprazole (NexIUM) 40 MG capsule    metoprolol tartrate (LOPRESSOR) 25 mg tablet    PARoxetine (PAXIL) 40 MG tablet    ranolazine (RANEXA) 500 mg 12 hr tablet    sacubitril-valsartan (Entresto) 49-51 MG TABS    zolpidem (AMBIEN) 10 mg tablet    clotrimazole-betamethasone (LOTRISONE) 1-0.05 % cream    Allergies   Allergen Reactions    Ciprofloxacin Hives    Wound Dressing Adhesive Other (See Comments)     unknown    Atarax [Hydroxyzine] Palpitations           Objective     Blood pressure 132/83, pulse (!) 50, height 5' 4" (1.626 m), weight 83.3 kg (183 lb 9.6 oz), SpO2 97 %. Body mass index is 31.51 kg/m². PHYSICAL EXAM:      General Appearance:   Alert, cooperative, no distress   HEENT:   Normocephalic, atraumatic, anicteric. Neck:  Supple, symmetrical, trachea midline   Lungs:   Clear to auscultation bilaterally; no rales, rhonchi or wheezing; respirations unlabored    Heart[de-identified]   Regular rate and rhythm; no murmur, rub, or gallop.    Abdomen:   Soft, non-tender, non-distended; normal bowel sounds; no masses, no organomegaly    Genitalia:   Deferred    Rectal:   Deferred    Extremities:  No cyanosis, clubbing or edema    Pulses:  2+ and symmetric    Skin:  No jaundice, rashes, or lesions    Lymph nodes:  No palpable cervical lymphadenopathy        Lab Results:   Appointment on 11/01/2023   Component Date Value    Sodium 11/01/2023 139     Potassium 11/01/2023 4.5     Chloride 11/01/2023 105     CO2 11/01/2023 30     ANION GAP 11/01/2023 4     BUN 11/01/2023 16     Creatinine 11/01/2023 0.75     Glucose, Fasting 11/01/2023 96     Calcium 11/01/2023 9.6     AST 11/01/2023 19     ALT 11/01/2023 21     Alkaline Phosphatase 11/01/2023 93     Total Protein 11/01/2023 7.8     Albumin 11/01/2023 4.7     Total Bilirubin 11/01/2023 0.37     eGFR 11/01/2023 93     Cholesterol 11/01/2023 193     Triglycerides 11/01/2023 129     HDL, Direct 11/01/2023 62     LDL Calculated 11/01/2023 105 (H)     WBC 11/01/2023 7.05     RBC 11/01/2023 4.89     Hemoglobin 11/01/2023 14.5     Hematocrit 11/01/2023 42.6     MCV 11/01/2023 87     MCH 11/01/2023 29.7     MCHC 11/01/2023 34.0     RDW 11/01/2023 11.9     MPV 11/01/2023 9.1     Platelets 65/02/6272 258     nRBC 11/01/2023 0     Neutrophils Relative 11/01/2023 49     Immat GRANS % 11/01/2023 0     Lymphocytes Relative 11/01/2023 35     Monocytes Relative 11/01/2023 11     Eosinophils Relative 11/01/2023 4     Basophils Relative 11/01/2023 1     Neutrophils Absolute 11/01/2023 3.49     Immature Grans Absolute 11/01/2023 0.02     Lymphocytes Absolute 11/01/2023 2.44     Monocytes Absolute 11/01/2023 0.74     Eosinophils Absolute 11/01/2023 0.31     Basophils Absolute 11/01/2023 0.05          Radiology Results:   No results found.

## 2023-11-02 ENCOUNTER — APPOINTMENT (OUTPATIENT)
Dept: LAB | Facility: HOSPITAL | Age: 48
End: 2023-11-02
Payer: COMMERCIAL

## 2023-11-02 DIAGNOSIS — R19.7 DIARRHEA, UNSPECIFIED TYPE: ICD-10-CM

## 2023-11-02 LAB — WBC STL QL MICRO: NORMAL

## 2023-11-02 PROCEDURE — 87209 SMEAR COMPLEX STAIN: CPT

## 2023-11-02 PROCEDURE — 82653 EL-1 FECAL QUANTITATIVE: CPT

## 2023-11-02 PROCEDURE — 87205 SMEAR GRAM STAIN: CPT

## 2023-11-02 PROCEDURE — 83993 ASSAY FOR CALPROTECTIN FECAL: CPT

## 2023-11-02 PROCEDURE — 87505 NFCT AGENT DETECTION GI: CPT

## 2023-11-02 PROCEDURE — 87177 OVA AND PARASITES SMEARS: CPT

## 2023-11-03 ENCOUNTER — TELEPHONE (OUTPATIENT)
Dept: GENETICS | Facility: CLINIC | Age: 48
End: 2023-11-03

## 2023-11-03 LAB
CAMPYLOBACTER DNA SPEC NAA+PROBE: NORMAL
SALMONELLA DNA SPEC QL NAA+PROBE: NORMAL
SHIGA TOXIN STX GENE SPEC NAA+PROBE: NORMAL
SHIGELLA DNA SPEC QL NAA+PROBE: NORMAL

## 2023-11-03 NOTE — TELEPHONE ENCOUNTER
I called and left a message for Odilon Medrano to confirm her upcoming via televisit she was encouraged to log on to her mychart or call our office to confirm her appointment.

## 2023-11-06 ENCOUNTER — TELEPHONE (OUTPATIENT)
Dept: GENETICS | Facility: CLINIC | Age: 48
End: 2023-11-06

## 2023-11-06 ENCOUNTER — TELEPHONE (OUTPATIENT)
Age: 48
End: 2023-11-06

## 2023-11-06 LAB — CALPROTECTIN STL-MCNT: 117 UG/G (ref 0–120)

## 2023-11-06 NOTE — TELEPHONE ENCOUNTER
Patients GI provider:       Number to return call: ( [phone]    Reason for call: Pt calling requesting to speak to nurse in office pt is stating that her appt was canceled because office refused lyft services. Pt wants call back. Pt dose not want to schedule another appt at this time,     Scheduled procedure/appointment date if applicable: Apt/procedure

## 2023-11-06 NOTE — TELEPHONE ENCOUNTER
Patient called and left a message requesting a call back to reschedule her upcoming appointment on 11/7/2023.

## 2023-11-07 ENCOUNTER — CLINICAL SUPPORT (OUTPATIENT)
Dept: GENETICS | Facility: CLINIC | Age: 48
End: 2023-11-07

## 2023-11-07 ENCOUNTER — DOCUMENTATION (OUTPATIENT)
Dept: GENETICS | Facility: CLINIC | Age: 48
End: 2023-11-07

## 2023-11-07 DIAGNOSIS — Z80.9 FAMILY HISTORY OF CANCER: ICD-10-CM

## 2023-11-07 LAB — ELASTASE PANC STL-MCNT: 174 UG ELAST./G

## 2023-11-07 NOTE — LETTER
2023     Iona Cox, [address] [facility]  [address]  [place]    Patient: Deedee Schneider  YOB: 1975  Date of Visit: 2023      Dear Dr. Rendon Dear: Thank you for referring Nicole Ham to me for evaluation. Below are my notes for this consultation. If you have questions, please do not hesitate to call me. I look forward to following your patient along with you. Sincerely,        Davon Jim        CC: No Recipients        Pre-Test Genetic Counseling Consult Note    Patient Name: Deedee Schneider   /Age: 1975/48 y.o. Referring Provider: Iona Cox PA-C    Date of Service: 2023  Genetic Counselor: Davon Jim MS, [facility]  Interpretation Services: None  Location: Telephone consult   Length of Visit:  39 minutes    Cynthia Escobar was referred to the [address] [place], [place] [address] and Genetic Assessment Program due to her family history of colon, ovarian, and breast cancer . she presents today to discuss the possibility of a hereditary cancer syndrome, options for genetic testing, and implications for her and her family. Cancer History and Treatment:   Personal History: no personal history of cancer    Screening Hx:   Breast:  Breast Imaging:   Mammogram (2022): Impression: No mammographic evidence of malignancy.    Breast Biopsy: none   Breast Density: Heterogeneously dense    Colon:  Colonoscopy (): 0 polyps reported  F/u recommended in 5 years given family history     Gynecologic:  NELSON/BSO per patient in  - unknown indication    Skin:  No current screening    Other screening: n/a    Reproductive History  Age at menarche: 10  Age at first live birth:  22  Menopause: Post Menopausal (39 secondary to NELSON/BSO)  Hormone replacement: none    Medical and Surgical History  Pertinent surgical history:   Past Surgical History:   Procedure Laterality Date   • CARDIAC CATHETERIZATION N/A 10/24/2022    Procedure: Cardiac Coronary Angiogram;  Surgeon: Jimbo Subramanian MD;  Location: [address] [place] [address] CATH LAB; Service: Cardiology   •  SECTION      x2   • HYSTERECTOMY     • KNEE SURGERY Left     4 times   • LEG SURGERY Right     thony placed then removed, broken femur   • NEUROPLASTY / TRANSPOSITION MEDIAN NERVE AT CARPAL TUNNEL        Pertinent medical history:  Past Medical History:   Diagnosis Date   • Anxiety    • Bipolar 1 disorder ([address]) 2020   • Bipolar 1 disorder ([address]) 2020   • Bipolar disease, manic ([address])    • Depression    • Gastroesophageal reflux disease without esophagitis 2020   • Schizophrenia ([address])          Other History:  Height:   Ht Readings from Last 1 Encounters:   23 5' 4" (1.626 m)     Weight:   Wt Readings from Last 1 Encounters:   23 83.3 kg (183 lb 9.6 oz)       Relevant Family History   Patient reports non-Ashkenazi Confucianist ancestry. Maternal Family History:  Uncle: colon cancer diagnosed in 36s/53s ()  First-cousin: ovarian cancer diagnosed at 28 ()  First-cousin: throat cancer ()  Grandmother: breast cancer diagnosed at unknown age, s/p unilateral mastectomy, recurrence w/ mets (d.80s)    Paternal Family History:  Father: ? Lung cancer (d.73)  Brother: colon cancer diagnosed at 77, liver mets (currently 78 y/o)   Sister: neuroendocrine carcinoma of colon diagnosed at 59 w/ mts to kidneys, adrenal glands, and liver (d.64)  Brother: leukemia (d.60s)    Please refer to the scanned pedigree in the Media Tab for a complete family history     *All history is reported as provided by the patient; records are not available for review, except where indicated. Assessment:  We discussed sporadic, familial and hereditary cancer. We reviewed that only 5-10% of cancers are considered hereditary.  Cancers such as lung cancer, cervical cancer, testicular cancer, and liver cancer very rarely have a hereditary etiology, and we cannot test for a genetic predisposition for these cancers at this time. We also discussed the many factors that influence our risk for cancer such as age, environmental exposures, lifestyle choices and family history. We reviewed the indications suggestive of a hereditary predisposition to cancer. Genetic testing is indicated for Marco Antonio Mckeon based on the following criteria: Meets NCCN V2.2024 Testing Criteria for High-Penetrance Breast Cancer Susceptibility Genes: second-degree relative with breast cancer and close blood relative with ovarian cancer. Marco Antonio Mckeon is unaffected, but is considering genetic testing due to a family history of colon cancer. Although Elizabeth's brother is the most informative person to undergo genetic testing, Marco Antonio Mckeon can consider genetic testing due to the following:   Patient does not have cancer; however, their brother has declined testing   and therefore patient is the most informative person for testing. The risks, benefits, and limitations of genetic testing were reviewed with the patient, as well as genetic discrimination laws, and possible test results (positive, negative, variants of uncertain significance) and their clinical implications. If positive for a mutation, options for managing cancer risk including increased surveillance, chemoprevention, and in some cases prophylactic surgery were discussed. Marco Antonio Mckeon was informed that if a hereditary cancer syndrome was identified in her, first degree relatives (parents, siblings, and children) have a chance of also inheriting the condition. Genetic testing would allow for predictive genetic testing in other relatives, who may also be at risk depending on their degree of relation. We reviewed a negative result does not indicate no risk for cancer. If her results are negative, she should continue cancer screening as recommended by her healthcare team.     Plan: Patient decided to proceed with testing and provided consent.     Summary:     Sample Collection: A blood kit was mailed home to the patient    Genetic Testing Preformed: [facility] Common Hereditary Cancers Panel (47 genes): APC, BAR, AXIN2, BARD1, BMPR1A, BRCA1, BRCA2, BRIP1, CDH1, CDK4, CDKN2A, CHEK2, CTNNA1, DICER1, EPCAM, GREM1, HOXB13, KIT, MEN1, MLH1, MSH2, MSH3, MSH6, MUTYH, NBN, NF1, NTHL1, PALB2, PDGFRA, PMS2, POLD1, POLE, PTEN, RAD50, RAD51C, RAD51D, SDHA, SDHB, SDHC, SDHD, SMAD4, SMARCA4, STK11, TP53, TSC1, TSC2, VHL     Result Call Information:  In the event that we need to reach [facility] via telephone:  I confirmed the patient's mobile number on file as the best number to call with results  I confirmed with the patient that we can leave a voicemail on her mobile number    Results take approximately 2-3 weeks to complete once test is started. [facility] will be notified via turntable.fmt once results are available. Additional recommendations for surveillance/medical management will be made pending genetic test results.

## 2023-11-07 NOTE — PROGRESS NOTES
Pre-Test Genetic Counseling Consult Note    Patient Name: Gibson Mendez   /Age: 1975/48 y.o. Referring Provider: Edwina Cardoza PA-C    Date of Service: 2023  Genetic Counselor: Leola Burr MS, [facility]  Interpretation Services: None  Location: Telephone consult   Length of Visit:  39 minutes    Malorie Cruz was referred to the [address] [place], [place] [address] Floor and Genetic Assessment Program due to her family history of colon, ovarian, and breast cancer . she presents today to discuss the possibility of a hereditary cancer syndrome, options for genetic testing, and implications for her and her family. Cancer History and Treatment:   Personal History: no personal history of cancer    Screening Hx:   Breast:  Breast Imaging:   Mammogram (2022): Impression: No mammographic evidence of malignancy. Breast Biopsy: none   Breast Density: Heterogeneously dense    Colon:  Colonoscopy (): 0 polyps reported  F/u recommended in 10 years     Gynecologic:  NELSON/BSO per patient in  - unknown indication    Skin:  No current screening    Other screening: n/a    Reproductive History  Age at menarche: 10  Age at first live birth:  22  Menopause: Post Menopausal (39 secondary to NELSON/BSO)  Hormone replacement: none    Medical and Surgical History  Pertinent surgical history:   Past Surgical History:   Procedure Laterality Date    CARDIAC CATHETERIZATION N/A 10/24/2022    Procedure: Cardiac Coronary Angiogram;  Surgeon: Lynn Ball MD;  Location: [address] [place] [address] CATH LAB;   Service: Cardiology     SECTION      x2    HYSTERECTOMY      KNEE SURGERY Left     4 times    LEG SURGERY Right     thony placed then removed, broken femur    NEUROPLASTY / TRANSPOSITION MEDIAN NERVE AT CARPAL TUNNEL        Pertinent medical history:  Past Medical History:   Diagnosis Date    Anxiety     Bipolar 1 disorder ([address]) 2020    Bipolar 1 disorder ([address]) 2020    Bipolar disease, manic ([address])     Depression Gastroesophageal reflux disease without esophagitis 2020    Schizophrenia ([address])          Other History:  Height:   Ht Readings from Last 1 Encounters:   23 5' 4" (1.626 m)     Weight:   Wt Readings from Last 1 Encounters:   23 83.3 kg (183 lb 9.6 oz)       Relevant Family History   Patient reports non-Ashkenazi Confucianism ancestry. Maternal Family History:  Uncle: colon cancer diagnosed in 36s/53s ()  Uncle: pancreatic cancer ()  First-cousin: ovarian cancer diagnosed at 28 ()  First-cousin: throat cancer ()  Grandmother: breast cancer diagnosed at unknown age, s/p unilateral mastectomy, recurrence w/ mets (d.80s)    Paternal Family History:  Father: ? Lung cancer (d.73)  Brother: colon cancer diagnosed at 77, liver mets (currently 78 y/o)   Sister: neuroendocrine carcinoma of colon diagnosed at 59 w/ mts to kidneys, adrenal glands, and liver (d.64)  Brother: leukemia (d.60s)    Please refer to the scanned pedigree in the Media Tab for a complete family history     *All history is reported as provided by the patient; records are not available for review, except where indicated. Assessment:  We discussed sporadic, familial and hereditary cancer. We reviewed that only 5-10% of cancers are considered hereditary. Cancers such as lung cancer, cervical cancer, testicular cancer, and liver cancer very rarely have a hereditary etiology, and we cannot test for a genetic predisposition for these cancers at this time. We also discussed the many factors that influence our risk for cancer such as age, environmental exposures, lifestyle choices and family history. We reviewed the indications suggestive of a hereditary predisposition to cancer. We discussed the possibility of Mcnamara syndrome given her paternal family history of colon cancer. We reviewed the cancer risks and NCCN screening recommendations for individuals with Mcnamara syndrome.      Genetic testing is indicated for Patricia Peng based on the following criteria: Meets NCCN V2.2024 Testing Criteria for High-Penetrance Breast Cancer Susceptibility Genes: second-degree relative with breast cancer and close blood relative with ovarian cancer. Patricia Peng is unaffected, but is considering genetic testing due to a family history of colon cancer. Although Elizabeth's brother is the most informative person to undergo genetic testing, Patricia Peng can consider genetic testing due to the following:   Patient does not have cancer; however, their brother has declined testing   and therefore patient is the most informative person for testing. The risks, benefits, and limitations of genetic testing were reviewed with the patient, as well as genetic discrimination laws, and possible test results (positive, negative, variants of uncertain significance) and their clinical implications. If positive for a mutation, options for managing cancer risk including increased surveillance, chemoprevention, and in some cases prophylactic surgery were discussed. Patricia Peng was informed that if a hereditary cancer syndrome was identified in her, first degree relatives (parents, siblings, and children) have a chance of also inheriting the condition. Genetic testing would allow for predictive genetic testing in other relatives, who may also be at risk depending on their degree of relation. We reviewed a negative result does not indicate no risk for cancer. If her results are negative, she should continue cancer screening as recommended by her healthcare team.     Plan: Patient decided to proceed with testing and provided consent.     Summary:     Sample Collection:  A blood kit was mailed home to the patient    Genetic Testing Preformed: [facility] Common Hereditary Cancers Panel (47 genes): APC, BAR, AXIN2, BARD1, BMPR1A, BRCA1, BRCA2, BRIP1, CDH1, CDK4, CDKN2A, CHEK2, CTNNA1, DICER1, EPCAM, GREM1, HOXB13, KIT, MEN1, MLH1, MSH2, MSH3, MSH6, MUTYH, NBN, NF1, NTHL1, PALB2, PDGFRA, PMS2, POLD1, POLE, PTEN, RAD50, RAD51C, RAD51D, SDHA, SDHB, SDHC, SDHD, SMAD4, SMARCA4, STK11, TP53, TSC1, TSC2, VHL     Result Call Information:  In the event that we need to reach [facility] via telephone:  I confirmed the patient's mobile number on file as the best number to call with results  I confirmed with the patient that we can leave a voicemail on her mobile number    Results take approximately 2-3 weeks to complete once test is started. [facility] will be notified via [employer] once results are available. Additional recommendations for surveillance/medical management will be made pending genetic test results.

## 2023-11-08 DIAGNOSIS — K86.81 EXOCRINE PANCREATIC INSUFFICIENCY: Primary | ICD-10-CM

## 2023-11-08 RX ORDER — PANCRELIPASE LIPASE, PANCRELIPASE PROTEASE, PANCRELIPASE AMYLASE 25000; 79000; 105000 [USP'U]/1; [USP'U]/1; [USP'U]/1
25000 CAPSULE, DELAYED RELEASE ORAL
Qty: 90 CAPSULE | Refills: 3 | Status: SHIPPED | OUTPATIENT
Start: 2023-11-08

## 2023-11-14 ENCOUNTER — APPOINTMENT (OUTPATIENT)
Dept: LAB | Facility: HOSPITAL | Age: 48
End: 2023-11-14
Attending: INTERNAL MEDICINE
Payer: COMMERCIAL

## 2023-11-14 DIAGNOSIS — Z80.0 FAMILY HISTORY OF MALIGNANT NEOPLASM OF GASTROINTESTINAL TRACT: ICD-10-CM

## 2023-11-14 DIAGNOSIS — Z80.41 FAMILY HISTORY OF MALIGNANT NEOPLASM OF OVARY: ICD-10-CM

## 2023-11-14 DIAGNOSIS — Z80.3 FAMILY HISTORY OF MALIGNANT NEOPLASM OF BREAST: ICD-10-CM

## 2023-11-14 PROCEDURE — 36415 COLL VENOUS BLD VENIPUNCTURE: CPT

## 2023-11-17 LAB — MISCELLANEOUS LAB TEST RESULT: NORMAL

## 2023-11-22 ENCOUNTER — TELEPHONE (OUTPATIENT)
Dept: GENETICS | Facility: CLINIC | Age: 48
End: 2023-11-22

## 2023-11-22 NOTE — TELEPHONE ENCOUNTER
Post-Test Genetic Counseling Consult Note    Today I spoke with Conrad Peguero over the phone to review the results of her genetic test for hereditary cancer. We met previously on 11/7/23 for pre-test counseling. A copy of this consult note and genetic test result will be shared with the patient. 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 The significance of the MUTYH variant is currently not known and therefore this test result cannot be used to help determine Conrad Peguero cancer risks. It is possible that the variant was seen in only a handful of individuals, or there may be conflicting or incomplete information in the medical literature about the variant and its association with hereditary cancer. The laboratory will continue to accumulate information on this variant and will reclassify it as either a positive or negative genetic test result when they are confident that they have adequate information. We will notify Conrad Peguero as updated information is obtained. It is important to note that the majority of variants of uncertain significance are reclassified as likely benign or benign as additional information about the variant becomes available.      Risk Based on Family History:  The significance of this variant is currently not known and therefore this test result cannot be used to help determine Garfield Conway cancer risks. Rather her personal medical history and family history of cancer are the most important factors used to estimate her risk for developing certain cancers and to direct her medical management. [place] risk of breast cancer may still be increased based on her personal risk factors and family history. Despite a negative test result, we are able to run risk models to better estimate Elizabeth's lifetime risk of developing breast cancer:     Tyrer-Cuzick model: Estimated lifetime risk, to age 80, for breast cancer is 8.2% compared to approximately 9.4% general population risk     Russella Locus model:Estimated lifetime risk, to age 80, for breast cancer is 7.4% compared to approximately 11.2% general population risk     Although these risk models do not predict a significantly increased lifetime risk, we cannot eliminate the possibility of an increased risk for breast cancer for Garfield Conway given her family history. I also reminded Garfield Conway that there is still a background risk for any woman to develop breast cancer over her lifetime (12-13%), regardless of family history. Risks and Testing for Family Members:  Genetic testing for this variant is not recommended for relatives who wish to determine their cancer risks for purposes of determining medical management. The presence or absence of this variant in a relative is not clinically meaningful unless the variant is reclassified in the future. Despite this result, [place] first-degree relatives may be at increased risk for the cancers based on the family history. We recommend they discuss screening and management recommendations with their healthcare providers. If Garfield Conway has any affected family members with a cancer diagnosis, especially at a young age, they may still consider genetic testing.  Relatives who wish to pursue genetic testing can reach out to the [facility] [phone]\[facility] [place] [facility]\"" [phone] to schedule an appointment or visit [email or web address] to identify a local genetic counselor. Additional Information:  A healthy lifestyle will improve overall health and reduce risk for illness. Eating a healthy diet and exercising for 4 hours per week is recommended. Both diet and exercise have been shown to help maintain a healthy weight. Postmenopausal women who are overweight are at higher risk for breast cancer. Moderate to heavy alcohol use can increase the risk for some cancers. Smoking cigarettes can also increase risk for breast, lung, prostate, pancreatic and other cancers. Plan:   There are no additional recommendations based on Elizabeth's result. she should continue cancer screening and medical management as clinically indicated and as determined appropriate by her healthcare providers. VUS Result: Abdirizak Lara was strongly encouraged to contact us regarding any changes in her personal or family history of cancer as these changes could alter our recommendation regarding genetic testing and/or cancer screening. Abdirizak Lara was also encouraged to follow up with us on an annual basis as variant classifications are subject to change.

## 2023-11-24 ENCOUNTER — VBI (OUTPATIENT)
Dept: ADMINISTRATIVE | Facility: OTHER | Age: 48
End: 2023-11-24

## 2023-12-13 ENCOUNTER — OFFICE VISIT (OUTPATIENT)
Dept: FAMILY MEDICINE CLINIC | Facility: CLINIC | Age: 48
End: 2023-12-13
Payer: COMMERCIAL

## 2023-12-13 VITALS
WEIGHT: 186 LBS | TEMPERATURE: 97.4 F | HEIGHT: 64 IN | DIASTOLIC BLOOD PRESSURE: 64 MMHG | OXYGEN SATURATION: 95 % | SYSTOLIC BLOOD PRESSURE: 136 MMHG | BODY MASS INDEX: 31.76 KG/M2 | HEART RATE: 57 BPM

## 2023-12-13 DIAGNOSIS — J41.1 MUCOPURULENT CHRONIC BRONCHITIS (HCC): Primary | ICD-10-CM

## 2023-12-13 PROCEDURE — 99213 OFFICE O/P EST LOW 20 MIN: CPT | Performed by: FAMILY MEDICINE

## 2023-12-13 RX ORDER — AZITHROMYCIN 250 MG/1
TABLET, FILM COATED ORAL DAILY
Qty: 6 TABLET | Refills: 0 | Status: SHIPPED | OUTPATIENT
Start: 2023-12-13 | End: 2023-12-18

## 2023-12-13 RX ORDER — ALBUTEROL SULFATE 90 UG/1
2 AEROSOL, METERED RESPIRATORY (INHALATION) EVERY 6 HOURS PRN
Qty: 6.7 G | Refills: 5 | Status: SHIPPED | OUTPATIENT
Start: 2023-12-13

## 2023-12-13 NOTE — PROGRESS NOTES
Name: Juju Winn      : 1975      MRN: [id number]  Encounter Provider: Scotty Sanders MD  Encounter Date: 2023   Encounter department: [address] [place] [address]     1. Mucopurulent chronic bronchitis (HCC)  -     azithromycin (Zithromax) 250 mg tablet; Take 2 tablets (500 mg total) by mouth daily for 1 day, THEN 1 tablet (250 mg total) daily for 4 days. -     albuterol (Proventil HFA) 90 mcg/act inhaler; Inhale 2 puffs every 6 (six) hours as needed for wheezing         Subjective      12-year-old female today for checkup for cough and congestion she has had for about 3 weeks patient with no fever but she does have some sore throat she does have a cough that is hard to bring any mucus denies any ear pain patient with some nasal discharge mostly has postnasal drip. No nausea vomiting diarrhea other appetite is decreased. Patient is drinking plenty of fluids and urinating well. Patient patient does smoke 3 cigarettes a day.       Review of Systems    Current Outpatient Medications on File Prior to Visit   Medication Sig   • ALPRAZolam (XANAX) 0.5 mg tablet Take 1 tablet (0.5 mg total) by mouth 2 (two) times a day as needed for anxiety   • amLODIPine (NORVASC) 5 mg tablet Take 1 tablet (5 mg total) by mouth daily   • aspirin (ECOTRIN LOW STRENGTH) 81 mg EC tablet Take 1 tablet (81 mg total) by mouth daily   • buPROPion (WELLBUTRIN XL) 150 mg 24 hr tablet Take 1 tablet (150 mg total) by mouth every morning   • erythromycin (ILOTYCIN) ophthalmic ointment Administer 0.5 inches to both eyes daily at bedtime   • esomeprazole (NexIUM) 40 MG capsule Take 1 capsule (40 mg total) by mouth 2 (two) times a day before meals   • metoprolol tartrate (LOPRESSOR) 25 mg tablet Take 1 tablet (25 mg total) by mouth every 12 (twelve) hours   • Pancrelipase, Lip-Prot-Amyl, (Zenpep) 63251-59899 units CPEP Take 25,000 units of lipase by mouth 3 (three) times a day with meals   • PARoxetine (PAXIL) 40 MG tablet Take 1 tablet (40 mg total) by mouth every morning   • ranolazine (RANEXA) 500 mg 12 hr tablet Take 1 tablet (500 mg total) by mouth 2 (two) times a day   • sacubitril-valsartan (Entresto) 49-51 MG TABS Take 1 tablet by mouth 2 (two) times a day   • zolpidem (AMBIEN) 10 mg tablet take 1 tablet by mouth at bedtime if needed for sleep   • clotrimazole-betamethasone (LOTRISONE) 1-0.05 % cream Apply topically 2 (two) times a day (Patient not taking: Reported on 11/1/2023)       Objective     /64 (BP Location: Left arm, Patient Position: Sitting, Cuff Size: Standard)   Pulse 57   Temp (!) 97.4 °F (36.3 °C) (Skin)   Ht 5' 4" (1.626 m)   Wt 84.4 kg (186 lb)   SpO2 95%   BMI 31.93 kg/m²     Physical Exam  Krystle Wallace MD

## 2023-12-20 ENCOUNTER — TELEPHONE (OUTPATIENT)
Age: 48
End: 2023-12-20

## 2023-12-20 DIAGNOSIS — R05.1 ACUTE COUGH: Primary | ICD-10-CM

## 2023-12-20 RX ORDER — BENZONATATE 200 MG/1
200 CAPSULE ORAL 3 TIMES DAILY PRN
Qty: 20 CAPSULE | Refills: 0 | Status: SHIPPED | OUTPATIENT
Start: 2023-12-20

## 2023-12-20 NOTE — TELEPHONE ENCOUNTER
Pt called in stating she was in the office on 12/13 for bronchitis and she was put on a z pack, but her lungs still hurt and she can't get the mucus out of her throat. She's using the inhaler, but she would like to know if something can be called into the pharmacy or does she need another appt. She's requesting a call back at [phone].

## 2023-12-21 DIAGNOSIS — F31.9 BIPOLAR 1 DISORDER (HCC): ICD-10-CM

## 2023-12-21 DIAGNOSIS — F33.41 RECURRENT MAJOR DEPRESSIVE DISORDER, IN PARTIAL REMISSION (HCC): ICD-10-CM

## 2023-12-21 RX ORDER — PAROXETINE HYDROCHLORIDE 40 MG/1
40 TABLET, FILM COATED ORAL EVERY MORNING
Qty: 90 TABLET | Refills: 1 | Status: SHIPPED | OUTPATIENT
Start: 2023-12-21

## 2023-12-26 NOTE — TELEPHONE ENCOUNTER
Patient called that she is still not better, she feel awful even after the round of zpak, and medication. Runny nose, cough, feels chest congestion but nothing coming up. She wanted to see if she needed a steroid called in. Please look into and give her a call back to advise.

## 2024-01-15 DIAGNOSIS — K21.9 GASTROESOPHAGEAL REFLUX DISEASE, UNSPECIFIED WHETHER ESOPHAGITIS PRESENT: ICD-10-CM

## 2024-01-15 RX ORDER — ESOMEPRAZOLE MAGNESIUM 40 MG/1
CAPSULE, DELAYED RELEASE ORAL
Qty: 60 CAPSULE | Refills: 5 | Status: SHIPPED | OUTPATIENT
Start: 2024-01-15

## 2024-01-16 ENCOUNTER — TELEPHONE (OUTPATIENT)
Age: 49
End: 2024-01-16

## 2024-01-16 NOTE — TELEPHONE ENCOUNTER
Patient called today to ask that [employer] services be arranged for her upcoming 1/30 at 9:30 AM appointment.    Call back [phone]

## 2024-01-24 DIAGNOSIS — R32 INCONTINENCE OF URINE IN FEMALE: Primary | ICD-10-CM

## 2024-02-21 PROBLEM — Z00.00 WELL ADULT EXAM: Status: RESOLVED | Noted: 2020-07-24 | Resolved: 2024-02-21

## 2024-03-17 ENCOUNTER — HOSPITAL ENCOUNTER (EMERGENCY)
Facility: HOSPITAL | Age: 49
Discharge: HOME | End: 2024-03-17
Attending: EMERGENCY MEDICINE
Payer: COMMERCIAL

## 2024-03-17 VITALS
DIASTOLIC BLOOD PRESSURE: 77 MMHG | RESPIRATION RATE: 18 BRPM | BODY MASS INDEX: 32.1 KG/M2 | SYSTOLIC BLOOD PRESSURE: 164 MMHG | TEMPERATURE: 98.4 F | HEART RATE: 58 BPM | OXYGEN SATURATION: 98 % | WEIGHT: 187 LBS

## 2024-03-17 DIAGNOSIS — K04.7 DENTAL ABSCESS: Primary | ICD-10-CM

## 2024-03-17 PROCEDURE — 99284 EMERGENCY DEPT VISIT MOD MDM: CPT | Performed by: NURSE PRACTITIONER

## 2024-03-17 PROCEDURE — 99282 EMERGENCY DEPT VISIT SF MDM: CPT

## 2024-03-17 RX ORDER — HYDROCODONE BITARTRATE AND ACETAMINOPHEN 5; 325 MG/1; MG/1
1 TABLET ORAL ONCE
Status: COMPLETED | OUTPATIENT
Start: 2024-03-17 | End: 2024-03-17

## 2024-03-17 RX ORDER — CLINDAMYCIN HYDROCHLORIDE 300 MG/1
300 CAPSULE ORAL 4 TIMES DAILY
Qty: 40 CAPSULE | Refills: 0 | Status: SHIPPED | OUTPATIENT
Start: 2024-03-17 | End: 2024-03-27

## 2024-03-17 RX ORDER — HYDROCODONE BITARTRATE AND ACETAMINOPHEN 5; 325 MG/1; MG/1
1 TABLET ORAL EVERY 6 HOURS PRN
Qty: 12 TABLET | Refills: 0 | Status: SHIPPED | OUTPATIENT
Start: 2024-03-17

## 2024-03-17 RX ORDER — AMOXICILLIN 250 MG/1
500 CAPSULE ORAL ONCE
Status: COMPLETED | OUTPATIENT
Start: 2024-03-17 | End: 2024-03-17

## 2024-03-17 RX ORDER — AMOXICILLIN 500 MG/1
500 CAPSULE ORAL 3 TIMES DAILY
Qty: 21 CAPSULE | Refills: 0 | Status: SHIPPED | OUTPATIENT
Start: 2024-03-17 | End: 2024-03-17 | Stop reason: ALTCHOICE

## 2024-03-17 RX ADMIN — AMOXICILLIN 500 MG: 250 CAPSULE ORAL at 09:04

## 2024-03-17 RX ADMIN — HYDROCODONE BITARTRATE AND ACETAMINOPHEN 1 TABLET: 5; 325 TABLET ORAL at 09:04

## 2024-03-17 NOTE — ED PROVIDER NOTES
History  Chief Complaint   Patient presents with    Dental Pain     Patient reporting left lower jaw dental pain x 4 days, has a broken tooth and cavity per patient.  Patient tried alternating tylenol/motrin.     48-year-old female presenting here with left lower dental pain involving mid to posterior most molars with associated facial swelling.  No fever no chills.  She reports a few days ago she was able to express some pus from the area.  Nothing that would suggest Troy's angina.  No evidence of facial cellulitis.      Dental Pain  Associated symptoms: no drooling, no facial swelling, no fever and no neck pain        Prior to Admission Medications   Prescriptions Last Dose Informant Patient Reported? Taking?   ALPRAZolam (XANAX) 0.5 mg tablet  Self No No   Sig: Take 1 tablet (0.5 mg total) by mouth 2 (two) times a day as needed for anxiety   PARoxetine (PAXIL) 40 MG tablet   No No   Sig: Take 1 tablet (40 mg total) by mouth every morning   Pancrelipase, Lip-Prot-Amyl, (Zenpep) 61428-15792 units CPEP   No No   Sig: Take 25,000 units of lipase by mouth 3 (three) times a day with meals   albuterol (Proventil HFA) 90 mcg/act inhaler   No No   Sig: Inhale 2 puffs every 6 (six) hours as needed for wheezing   amLODIPine (NORVASC) 5 mg tablet  Self No No   Sig: Take 1 tablet (5 mg total) by mouth daily   aspirin (ECOTRIN LOW STRENGTH) 81 mg EC tablet  Self No No   Sig: Take 1 tablet (81 mg total) by mouth daily   benzonatate (TESSALON) 200 MG capsule   No No   Sig: Take 1 capsule (200 mg total) by mouth 3 (three) times a day as needed for cough   buPROPion (WELLBUTRIN XL) 150 mg 24 hr tablet  Self No No   Sig: Take 1 tablet (150 mg total) by mouth every morning   clotrimazole-betamethasone (LOTRISONE) 1-0.05 % cream  Self No No   Sig: Apply topically 2 (two) times a day   Patient not taking: Reported on 11/1/2023   erythromycin (ILOTYCIN) ophthalmic ointment  Self No No   Sig: Administer 0.5 inches to both eyes daily  at bedtime   esomeprazole (NexIUM) 40 MG capsule   No No   Sig: take 1 capsule by mouth twice a day before meals   metoprolol tartrate (LOPRESSOR) 25 mg tablet  Self No No   Sig: Take 1 tablet (25 mg total) by mouth every 12 (twelve) hours   ranolazine (RANEXA) 500 mg 12 hr tablet  Self No No   Sig: Take 1 tablet (500 mg total) by mouth 2 (two) times a day   sacubitril-valsartan (Entresto) 49-51 MG TABS  Self No No   Sig: Take 1 tablet by mouth 2 (two) times a day   zolpidem (AMBIEN) 10 mg tablet  Self No No   Sig: take 1 tablet by mouth at bedtime if needed for sleep      Facility-Administered Medications: None       Past Medical History:   Diagnosis Date    Anxiety     Bipolar 1 disorder (HCC) 2020    Bipolar 1 disorder (HCC) 2020    Bipolar disease, manic (HCC)     Bundle branch block, left     Depression     Gastroesophageal reflux disease without esophagitis 2020    Schizophrenia (HCC)        Past Surgical History:   Procedure Laterality Date    CARDIAC CATHETERIZATION N/A 10/24/2022    Procedure: Cardiac Coronary Angiogram;  Surgeon: Velia Montez MD;  Location: [facility];  Service: Cardiology     SECTION      x2    HYSTERECTOMY      KNEE SURGERY Left     4 times    LEG SURGERY Right     thony placed then removed, broken femur    NEUROPLASTY / TRANSPOSITION MEDIAN NERVE AT CARPAL TUNNEL         Family History   Problem Relation Age of Onset    Heart attack Mother     Heart disease Mother     Heart failure Father     COPD Father     Liver cancer Sister     Colon cancer Sister 64    Kidney cancer Sister     Breast cancer Maternal Grandmother     Heart disease Brother     Colon cancer Brother 67    Liver cancer Brother     Heart attack Maternal Uncle      I have reviewed and agree with the history as documented.    E-Cigarette/Vaping    E-Cigarette Use Never User      E-Cigarette/Vaping Substances    Nicotine No     THC No     CBD No     Flavoring No     Other No     Unknown  No      Social History     Tobacco Use    Smoking status: Former     Current packs/day: 0.00     Types: Cigarettes     Quit date: 2023     Years since quittin.5     Passive exposure: Current    Smokeless tobacco: Never   Vaping Use    Vaping status: Never Used   Substance Use Topics    Alcohol use: Not Currently    Drug use: Not Currently       Review of Systems   Constitutional:  Negative for chills and fever.   HENT:  Positive for dental problem (has toothache). Negative for drooling, ear pain, facial swelling, postnasal drip, sore throat, trouble swallowing and voice change.    Eyes:  Negative for pain and redness.   Respiratory:  Negative for cough and choking.    Cardiovascular:  Negative for chest pain and palpitations.   Gastrointestinal:  Negative for diarrhea, nausea and vomiting.   Musculoskeletal:  Negative for neck pain and neck stiffness.   Skin:  Negative for rash.   Neurological:  Negative for facial asymmetry and light-headedness.       Physical Exam  Physical Exam  Vitals and nursing note reviewed.   Constitutional:       General: She is not in acute distress.     Appearance: She is well-developed. She is not ill-appearing or toxic-appearing.   HENT:      Head: Normocephalic and atraumatic.      Nose: No rhinorrhea.      Mouth/Throat:      Mouth: Mucous membranes are moist.      Dentition: Abnormal dentition. Dental caries and dental abscesses present.   Eyes:      General:         Right eye: No discharge.         Left eye: No discharge.   Cardiovascular:      Rate and Rhythm: Normal rate and regular rhythm.   Pulmonary:      Effort: Pulmonary effort is normal. No accessory muscle usage or respiratory distress.   Abdominal:      General: There is no distension.      Tenderness: There is no guarding.   Musculoskeletal:         General: Normal range of motion.      Cervical back: Normal range of motion and neck supple. No rigidity.   Skin:     General: Skin is warm and dry.   Neurological:       Mental Status: She is alert and oriented to person, place, and time.      Coordination: Coordination normal.   Psychiatric:         Behavior: Behavior is cooperative.         Vital Signs  ED Triage Vitals   Temperature Pulse Respirations Blood Pressure SpO2   03/17/24 0838 03/17/24 0838 03/17/24 0838 03/17/24 0840 03/17/24 0838   98.4 °F (36.9 °C) 58 18 164/77 98 %      Temp Source Heart Rate Source Patient Position - Orthostatic VS BP Location FiO2 (%)   03/17/24 0838 03/17/24 0838 -- -- --   Oral Monitor         Pain Score       03/17/24 0838       10 - Worst Possible Pain           Vitals:    03/17/24 0838 03/17/24 0840   BP:  164/77   Pulse: 58          Visual Acuity      ED Medications  Medications   amoxicillin (AMOXIL) capsule 500 mg (500 mg Oral Given 3/17/24 0904)   HYDROcodone-acetaminophen (NORCO) 5-325 mg per tablet 1 tablet (1 tablet Oral Given 3/17/24 0904)       Diagnostic Studies  Results Reviewed       None                   No orders to display              Procedures  Procedures         ED Course                               SBIRT 22yo+      Flowsheet Row Most Recent Value   Initial Alcohol Screen: US AUDIT-C     1. How often do you have a drink containing alcohol? 0 Filed at: 03/17/2024 0844   2. How many drinks containing alcohol do you have on a typical day you are drinking?  0 Filed at: 03/17/2024 0844   3a. Male UNDER 65: How often do you have five or more drinks on one occasion? 0 Filed at: 03/17/2024 0844   3b. FEMALE Any Age, or MALE 65+: How often do you have 4 or more drinks on one occassion? 0 Filed at: 03/17/2024 0844   Audit-C Score 0 Filed at: 03/17/2024 0844   CHARISMA: How many times in the past year have you...    Used an illegal drug or used a prescription medication for non-medical reasons? Never Filed at: 03/17/2024 0844                      Medical Decision Making  Dental caries with associated tooth decay and left lower dental abscess.  Recommend following up with [facility] or a  dentist.    Risk  Prescription drug management.             Disposition  Final diagnoses:   Dental abscess     Time reflects when diagnosis was documented in both MDM as applicable and the Disposition within this note       Time User Action Codes Description Comment    3/17/2024  9:15 AM Femi Rodriguez Add [K04.7] Dental abscess           ED Disposition       ED Disposition   Discharge    Condition   Stable    Date/Time   Sun Mar 17, 2024 0915    Comment   Elizabeth Alvarez discharge to home/self care.                   Follow-up Information       Follow up With Specialties Details Why Contact Info    Emigdio Rodriguez MD Family Medicine   [address] [facility] [address]  [phone]      [facility]  Schedule an appointment as soon as possible for a visit  For Continued Evaluation [address] [place] [id number]  [phone]    [facility] [place]  Schedule an appointment as soon as possible for a visit  For Continued Evaluation [address] [address]  [phone]            Discharge Medication List as of 3/17/2024  9:17 AM        START taking these medications    Details   HYDROcodone-acetaminophen (NORCO) 5-325 mg per tablet Take 1 tablet by mouth every 6 (six) hours as needed for pain for up to 12 doses Max Daily Amount: 4 tablets, Starting Sun 3/17/2024, Normal      amoxicillin (AMOXIL) 500 mg capsule Take 1 capsule (500 mg total) by mouth 3 (three) times a day for 7 days, Starting Sun 3/17/2024, Until Sun 3/24/2024, Normal           CONTINUE these medications which have NOT CHANGED    Details   albuterol (Proventil HFA) 90 mcg/act inhaler Inhale 2 puffs every 6 (six) hours as needed for wheezing, Starting Wed 12/13/2023, Normal      ALPRAZolam (XANAX) 0.5 mg tablet Take 1 tablet (0.5 mg total) by mouth 2 (two) times a day as needed for anxiety, Starting Wed 9/27/2023, Normal       amLODIPine (NORVASC) 5 mg tablet Take 1 tablet (5 mg total) by mouth daily, Starting Wed 10/4/2023, Normal      aspirin (ECOTRIN LOW STRENGTH) 81 mg EC tablet Take 1 tablet (81 mg total) by mouth daily, Starting Wed 10/4/2023, Normal      benzonatate (TESSALON) 200 MG capsule Take 1 capsule (200 mg total) by mouth 3 (three) times a day as needed for cough, Starting Wed 12/20/2023, Normal      buPROPion (WELLBUTRIN XL) 150 mg 24 hr tablet Take 1 tablet (150 mg total) by mouth every morning, Starting Thu 4/6/2023, Until Sun 3/31/2024, Normal      clotrimazole-betamethasone (LOTRISONE) 1-0.05 % cream Apply topically 2 (two) times a day, Starting Fri 8/4/2023, Normal      erythromycin (ILOTYCIN) ophthalmic ointment Administer 0.5 inches to both eyes daily at bedtime, Starting Fri 8/4/2023, Normal      esomeprazole (NexIUM) 40 MG capsule take 1 capsule by mouth twice a day before meals, Normal      metoprolol tartrate (LOPRESSOR) 25 mg tablet Take 1 tablet (25 mg total) by mouth every 12 (twelve) hours, Starting Wed 10/4/2023, Normal      Pancrelipase, Lip-Prot-Amyl, (Zenpep) 73588-14314 units CPEP Take 25,000 units of lipase by mouth 3 (three) times a day with meals, Starting Wed 11/8/2023, Normal      PARoxetine (PAXIL) 40 MG tablet Take 1 tablet (40 mg total) by mouth every morning, Starting Thu 12/21/2023, Normal      ranolazine (RANEXA) 500 mg 12 hr tablet Take 1 tablet (500 mg total) by mouth 2 (two) times a day, Starting Wed 10/4/2023, Normal      sacubitril-valsartan (Entresto) 49-51 MG TABS Take 1 tablet by mouth 2 (two) times a day, Starting Wed 10/4/2023, Normal      zolpidem (AMBIEN) 10 mg tablet take 1 tablet by mouth at bedtime if needed for sleep, Starting Thu 10/12/2023, Normal             No discharge procedures on file.    PDMP Review         Value Time User    PDMP Reviewed  Yes 10/12/2023  7:11 PM Emigdio Rodriguez MD            ED Provider  Electronically Signed by             Femi Rodriguez,  SHARRI  03/17/24 2145

## 2024-03-23 DIAGNOSIS — Z72.0 TOBACCO ABUSE: ICD-10-CM

## 2024-03-23 DIAGNOSIS — F33.41 RECURRENT MAJOR DEPRESSIVE DISORDER, IN PARTIAL REMISSION (HCC): ICD-10-CM

## 2024-03-25 RX ORDER — BUPROPION HYDROCHLORIDE 150 MG/1
150 TABLET ORAL EVERY MORNING
Qty: 90 TABLET | Refills: 1 | Status: SHIPPED | OUTPATIENT
Start: 2024-03-25

## 2024-03-28 ENCOUNTER — TELEMEDICINE (OUTPATIENT)
Dept: FAMILY MEDICINE CLINIC | Facility: CLINIC | Age: 49
End: 2024-03-28
Payer: COMMERCIAL

## 2024-03-28 DIAGNOSIS — F31.9 BIPOLAR 1 DISORDER (HCC): ICD-10-CM

## 2024-03-28 DIAGNOSIS — I10 ESSENTIAL HYPERTENSION: ICD-10-CM

## 2024-03-28 DIAGNOSIS — E78.5 DYSLIPIDEMIA, GOAL LDL BELOW 70: ICD-10-CM

## 2024-03-28 DIAGNOSIS — Z72.0 TOBACCO ABUSE: Primary | ICD-10-CM

## 2024-03-28 DIAGNOSIS — F33.41 RECURRENT MAJOR DEPRESSIVE DISORDER, IN PARTIAL REMISSION (HCC): ICD-10-CM

## 2024-03-28 DIAGNOSIS — F41.9 ANXIETY: ICD-10-CM

## 2024-03-28 DIAGNOSIS — G47.00 INSOMNIA, UNSPECIFIED TYPE: ICD-10-CM

## 2024-03-28 DIAGNOSIS — I42.8 NON-ISCHEMIC CARDIOMYOPATHY (HCC): ICD-10-CM

## 2024-03-28 DIAGNOSIS — K21.9 GASTROESOPHAGEAL REFLUX DISEASE, UNSPECIFIED WHETHER ESOPHAGITIS PRESENT: ICD-10-CM

## 2024-03-28 PROCEDURE — 99214 OFFICE O/P EST MOD 30 MIN: CPT | Performed by: FAMILY MEDICINE

## 2024-03-28 RX ORDER — ESOMEPRAZOLE MAGNESIUM 40 MG/1
40 CAPSULE, DELAYED RELEASE ORAL DAILY
Qty: 90 CAPSULE | Refills: 1 | Status: SHIPPED | OUTPATIENT
Start: 2024-03-28

## 2024-03-28 RX ORDER — AMLODIPINE BESYLATE 5 MG/1
5 TABLET ORAL DAILY
Qty: 30 TABLET | Refills: 5 | Status: SHIPPED | OUTPATIENT
Start: 2024-03-28 | End: 2024-03-28 | Stop reason: SDUPTHER

## 2024-03-28 RX ORDER — ZOLPIDEM TARTRATE 10 MG/1
10 TABLET ORAL
Qty: 30 TABLET | Refills: 5 | Status: SHIPPED | OUTPATIENT
Start: 2024-03-28

## 2024-03-28 RX ORDER — PAROXETINE HYDROCHLORIDE 40 MG/1
40 TABLET, FILM COATED ORAL EVERY MORNING
Qty: 90 TABLET | Refills: 1 | Status: SHIPPED | OUTPATIENT
Start: 2024-03-28

## 2024-03-28 RX ORDER — ALPRAZOLAM 0.5 MG/1
0.5 TABLET ORAL 2 TIMES DAILY PRN
Qty: 60 TABLET | Refills: 3 | Status: SHIPPED | OUTPATIENT
Start: 2024-03-28

## 2024-03-28 RX ORDER — ATORVASTATIN CALCIUM 20 MG/1
20 TABLET, FILM COATED ORAL DAILY
Qty: 90 TABLET | Refills: 1 | Status: SHIPPED | OUTPATIENT
Start: 2024-03-28

## 2024-03-28 RX ORDER — AMLODIPINE BESYLATE 5 MG/1
5 TABLET ORAL DAILY
Qty: 30 TABLET | Refills: 5 | Status: SHIPPED | OUTPATIENT
Start: 2024-03-28

## 2024-03-28 NOTE — PROGRESS NOTES
Name: Elizabeth Alvarez      : 1975      MRN: [id number]  Encounter Provider: Emigdio Rodriguez MD  Encounter Date: 3/28/2024   Encounter department: [facility]    Assessment & Plan     1. Tobacco abuse  Assessment & Plan:  Patient encouraged to quit using tobacco for that increases their risk for COPD, lung cancer,stroke, oral cancer and heart disease. If patient does not want to quit they should let me know  when they are interested in quitting. There are numerous options to use to quit and we can discuss them.       2. Recurrent major depressive disorder, in partial remission (HCC)  Assessment & Plan:  Patient to continue utilizing medical therapy as well and counseling sources as applicable for condition. If  suicidal thought or fear of suicide to contact 911 and seek help immediately. Meds reviewed and patient questions answered today       3. Non-ischemic cardiomyopathy (HCC)  Assessment & Plan:  Patient is stable  and will continue present plan of care and reassess at next routine visit. All questions about this problem from patient were answered today.       4. Essential hypertension  Assessment & Plan:  Patient is stable with current anti-hypertensive medicine and continue to follow a low sodium diet and take current medication. All questions about this condition were answered today.       5. Dyslipidemia, goal LDL below 70  Assessment & Plan:  Patient  is stable with current medication and we discussed a low fat low cholesterol diet. Weight loss also discussed for this will help lower cholesterol also. Recheck lipids in 6 months.            Subjective     HPI  Review of Systems    Past Medical History:   Diagnosis Date   • Anxiety    • Bipolar 1 disorder (HCC) 2020   • Bipolar 1 disorder (HCC) 2020   • Bipolar disease, manic (HCC)    • Bundle branch block, left    • Depression    • Gastroesophageal reflux disease without esophagitis 2020   •  Schizophrenia (HCC)      Past Surgical History:   Procedure Laterality Date   • CARDIAC CATHETERIZATION N/A 10/24/2022    Procedure: Cardiac Coronary Angiogram;  Surgeon: Velia Montez MD;  Location: [facility];  Service: Cardiology   •  SECTION      x2   • HYSTERECTOMY     • KNEE SURGERY Left     4 times   • LEG SURGERY Right     thony placed then removed, broken femur   • NEUROPLASTY / TRANSPOSITION MEDIAN NERVE AT CARPAL TUNNEL       Family History   Problem Relation Age of Onset   • Heart attack Mother    • Heart disease Mother    • Heart failure Father    • COPD Father    • Liver cancer Sister    • Colon cancer Sister 64   • Kidney cancer Sister    • Breast cancer Maternal Grandmother    • Heart disease Brother    • Colon cancer Brother 67   • Liver cancer Brother    • Heart attack Maternal Uncle      Social History     Socioeconomic History   • Marital status: /Civil Union     Spouse name: Not on file   • Number of children: Not on file   • Years of education: Not on file   • Highest education level: Not on file   Occupational History   • Not on file   Tobacco Use   • Smoking status: Former     Current packs/day: 0.00     Types: Cigarettes     Quit date: 2023     Years since quittin.6     Passive exposure: Current   • Smokeless tobacco: Never   Vaping Use   • Vaping status: Never Used   Substance and Sexual Activity   • Alcohol use: Not Currently   • Drug use: Not Currently   • Sexual activity: Not on file   Other Topics Concern   • Not on file   Social History Narrative    · Do you currently or have you served in the [employer]:   No      · Were you activated, into active duty, as a member of the [employer] or as a Reservist:   No        · Exercise level:   None      · Diet:   Regular      · General stress level:   High       · Caffeine intake:   Moderate        · Seat belts used routinely:   Yes      · Smoke alarm in home:   Yes      Social Determinants of Health      Financial Resource Strain: Not on file   Food Insecurity: No Food Insecurity (2023)    Hunger Vital Sign    • Worried About Running Out of Food in the Last Year: Never true    • Ran Out of Food in the Last Year: Never true   Transportation Needs: No Transportation Needs (2023)    PRAPARE - Transportation    • Lack of Transportation (Medical): No    • Lack of Transportation (Non-Medical): No   Physical Activity: Not on file   Stress: Not on file   Social Connections: Not on file   Intimate Partner Violence: Not on file   Housing Stability: High Risk (2023)    Housing Stability Vital Sign    • Unable to Pay for Housing in the Last Year: Yes    • Number of Places Lived in the Last Year: 2    • Unstable Housing in the Last Year: Yes     Current Outpatient Medications on File Prior to Visit   Medication Sig   • albuterol (Proventil HFA) 90 mcg/act inhaler Inhale 2 puffs every 6 (six) hours as needed for wheezing   • ALPRAZolam (XANAX) 0.5 mg tablet Take 1 tablet (0.5 mg total) by mouth 2 (two) times a day as needed for anxiety   • aspirin (ECOTRIN LOW STRENGTH) 81 mg EC tablet Take 1 tablet (81 mg total) by mouth daily   • benzonatate (TESSALON) 200 MG capsule Take 1 capsule (200 mg total) by mouth 3 (three) times a day as needed for cough   • buPROPion (WELLBUTRIN XL) 150 mg 24 hr tablet take 1 tablet by mouth every morning   • [] clindamycin (CLEOCIN) 300 MG capsule Take 1 capsule (300 mg total) by mouth 4 (four) times a day for 10 days   • clotrimazole-betamethasone (LOTRISONE) 1-0.05 % cream Apply topically 2 (two) times a day (Patient not taking: Reported on 2023)   • erythromycin (ILOTYCIN) ophthalmic ointment Administer 0.5 inches to both eyes daily at bedtime   • esomeprazole (NexIUM) 40 MG capsule take 1 capsule by mouth twice a day before meals   • HYDROcodone-acetaminophen (NORCO) 5-325 mg per tablet Take 1 tablet by mouth every 6 (six) hours as needed for pain for up to 12 doses  Max Daily Amount: 4 tablets   • metoprolol tartrate (LOPRESSOR) 25 mg tablet Take 1 tablet (25 mg total) by mouth every 12 (twelve) hours   • Pancrelipase, Lip-Prot-Amyl, (Zenpep) 45819-40864 units CPEP Take 25,000 units of lipase by mouth 3 (three) times a day with meals   • PARoxetine (PAXIL) 40 MG tablet Take 1 tablet (40 mg total) by mouth every morning   • ranolazine (RANEXA) 500 mg 12 hr tablet Take 1 tablet (500 mg total) by mouth 2 (two) times a day   • sacubitril-valsartan (Entresto) 49-51 MG TABS Take 1 tablet by mouth 2 (two) times a day   • zolpidem (AMBIEN) 10 mg tablet take 1 tablet by mouth at bedtime if needed for sleep   • [DISCONTINUED] amLODIPine (NORVASC) 5 mg tablet Take 1 tablet (5 mg total) by mouth daily     Allergies   Allergen Reactions   • Ciprofloxacin Hives   • Wound Dressing Adhesive Other (See Comments)     unknown   • Atarax [Hydroxyzine] Palpitations     Immunization History   Administered Date(s) Administered   • INFLUENZA 12/02/2014   • Tuberculin Skin Test-PPD Intradermal 12/17/2012       Objective     There were no vitals taken for this visit.    Physical Exam  Emigdio Rodriguez MD

## 2024-03-28 NOTE — PROGRESS NOTES
Virtual Regular Visit    Verification of patient location:    Patient is located at Home in the following state in which I hold an active license [place]      Assessment/Plan:    Problem List Items Addressed This Visit     Recurrent major depressive disorder, in partial remission (HCC)     Patient to continue utilizing medical therapy as well and counseling sources as applicable for condition. If  suicidal thought or fear of suicide to contact 911 and seek help immediately. Meds reviewed and patient questions answered today          Relevant Medications    zolpidem (AMBIEN) 10 mg tablet    PARoxetine (PAXIL) 40 MG tablet    ALPRAZolam (XANAX) 0.5 mg tablet    Bipolar 1 disorder (HCC)    Relevant Medications    zolpidem (AMBIEN) 10 mg tablet    PARoxetine (PAXIL) 40 MG tablet    ALPRAZolam (XANAX) 0.5 mg tablet    Essential hypertension     Patient is stable with current anti-hypertensive medicine and continue to follow a low sodium diet and take current medication. All questions about this condition were answered today.          Relevant Medications    amLODIPine (NORVASC) 5 mg tablet    Tobacco abuse - Primary     Patient encouraged to quit using tobacco for that increases their risk for COPD, lung cancer,stroke, oral cancer and heart disease. If patient does not want to quit they should let me know  when they are interested in quitting. There are numerous options to use to quit and we can discuss them.          Dyslipidemia, goal LDL below 70     Patient  is stable with current medication and we discussed a low fat low cholesterol diet. Weight loss also discussed for this will help lower cholesterol also. Recheck lipids in 6 months.          Relevant Medications    atorvastatin (LIPITOR) 20 mg tablet    Non-ischemic cardiomyopathy (HCC)     Patient is stable  and will continue present plan of care and reassess at next routine visit. All questions about this problem from patient were answered today.          Relevant  Medications    amLODIPine (NORVASC) 5 mg tablet   Other Visit Diagnoses     Insomnia, unspecified type        Relevant Medications    zolpidem (AMBIEN) 10 mg tablet    Anxiety        Relevant Medications    ALPRAZolam (XANAX) 0.5 mg tablet    Gastroesophageal reflux disease, unspecified whether esophagitis present        Relevant Medications    esomeprazole (NexIUM) 40 MG capsule               Reason for visit is   Chief Complaint   Patient presents with   • Virtual Regular Visit          Encounter provider Emigdio Rodriguez MD    Provider located at [facility]  [address]  [place] [address] [phone]      Recent Visits  No visits were found meeting these conditions.  Showing recent visits within past 7 days and meeting all other requirements  Today's Visits  Date Type Provider Dept   03/28/24 Telemedicine Emigdio Rodriguez MD [facility]   Showing today's visits and meeting all other requirements  Future Appointments  No visits were found meeting these conditions.  Showing future appointments within next 150 days and meeting all other requirements       The patient was identified by name and date of birth. Elizabeth Alvarez was informed that this is a telemedicine visit and that the visit is being conducted through the Epic Embedded platform. She agrees to proceed..  My office door was closed. No one else was in the room.  She acknowledged consent and understanding of privacy and security of the video platform. The patient has agreed to participate and understands they can discontinue the visit at any time.    Patient is aware this is a billable service.     Subjective  Elizabeth Alvarez is a 48 y.o. female for checkup                                                                                  .      -year-old female for taking care of her brother who is dying with cancer for her regular checkup for multimedical problems patient with coronary disease with  some arrhythmias as well as history of smoking bipolar disorder depression anxiety insomnia some GERD and is stable.  The patient appears very tired today because she is spending lots of time taking care of her brother who lives downstairs from her who is dying with terminal colon cancer.  Patient has refills on her medicines as she states that her Lipitor had fallen off of her list and she needs refills and that she has been off that medicine for a while we will see about getting her back on that and I will check her cholesterol back in about 3 months and we will see her back in the office.         Past Medical History:   Diagnosis Date   • Anxiety    • Bipolar 1 disorder (HCC) 2020   • Bipolar 1 disorder (HCC) 2020   • Bipolar disease, manic (HCC)    • Bundle branch block, left    • Depression    • Gastroesophageal reflux disease without esophagitis 2020   • Schizophrenia ([facility])        Past Surgical History:   Procedure Laterality Date   • CARDIAC CATHETERIZATION N/A 10/24/2022    Procedure: Cardiac Coronary Angiogram;  Surgeon: Velia Montez MD;  Location: [facility] CARDI[facility]ATH [facility];  Service: Cardiology   •  SECTION      x2   • HYSTERECTOMY     • KNEE SURGERY Left     4 times   • LEG SURGERY Right     thony placed then removed, broken femur   • NEUROPLASTY / TRANSPOSITION MEDIAN NERVE AT CARPAL TUNNEL         Current Outpatient Medications   Medication Sig Dispense Refill   • ALPRAZolam (XANAX) 0.5 mg tablet Take 1 tablet (0.5 mg total) by mouth 2 (two) times a day as needed for anxiety 60 tablet 3   • amLODIPine (NORVASC) 5 mg tablet Take 1 tablet (5 mg total) by mouth daily 30 tablet 5   • atorvastatin (LIPITOR) 20 mg tablet Take 1 tablet (20 mg total) by mouth daily 90 tablet 1   • esomeprazole (NexIUM) 40 MG capsule Take 1 capsule (40 mg total) by mouth daily 90 capsule 1   • PARoxetine (PAXIL) 40 MG tablet Take 1 tablet (40 mg total) by mouth every morning 90 tablet 1   • zolpidem  (AMBIEN) 10 mg tablet Take 1 tablet (10 mg total) by mouth daily at bedtime as needed for sleep 30 tablet 5   • albuterol (Proventil HFA) 90 mcg/act inhaler Inhale 2 puffs every 6 (six) hours as needed for wheezing 6.7 g 5   • aspirin (ECOTRIN LOW STRENGTH) 81 mg EC tablet Take 1 tablet (81 mg total) by mouth daily 90 tablet 3   • benzonatate (TESSALON) 200 MG capsule Take 1 capsule (200 mg total) by mouth 3 (three) times a day as needed for cough 20 capsule 0   • buPROPion (WELLBUTRIN XL) 150 mg 24 hr tablet take 1 tablet by mouth every morning 90 tablet 1   • clotrimazole-betamethasone (LOTRISONE) 1-0.05 % cream Apply topically 2 (two) times a day (Patient not taking: Reported on 11/1/2023) 30 g 0   • erythromycin (ILOTYCIN) ophthalmic ointment Administer 0.5 inches to both eyes daily at bedtime 3.5 g 0   • HYDROcodone-acetaminophen (NORCO) 5-325 mg per tablet Take 1 tablet by mouth every 6 (six) hours as needed for pain for up to 12 doses Max Daily Amount: 4 tablets 12 tablet 0   • metoprolol tartrate (LOPRESSOR) 25 mg tablet Take 1 tablet (25 mg total) by mouth every 12 (twelve) hours 180 tablet 3   • ranolazine (RANEXA) 500 mg 12 hr tablet Take 1 tablet (500 mg total) by mouth 2 (two) times a day 180 tablet 3   • sacubitril-valsartan (Entresto) 49-51 MG TABS Take 1 tablet by mouth 2 (two) times a day 180 tablet 1     No current facility-administered medications for this visit.        Allergies   Allergen Reactions   • Ciprofloxacin Hives   • Wound Dressing Adhesive Other (See Comments)     unknown   • Atarax [Hydroxyzine] Palpitations       Review of Systems   Constitutional:  Negative for activity change, appetite change, fatigue and fever.   HENT:  Negative for congestion, ear pain, postnasal drip, rhinorrhea, sinus pressure, sinus pain, sneezing and sore throat.    Eyes:  Negative for pain and redness.   Respiratory:  Negative for apnea, cough, chest tightness, shortness of breath and wheezing.     Cardiovascular:  Negative for chest pain, palpitations and leg swelling.   Gastrointestinal:  Negative for abdominal pain, constipation, diarrhea, nausea and vomiting.   Endocrine: Negative for cold intolerance and heat intolerance.   Genitourinary:  Negative for difficulty urinating, dysuria, frequency, hematuria and urgency.   Musculoskeletal:  Negative for arthralgias, back pain, gait problem and myalgias.   Skin:  Negative for rash.   Neurological:  Negative for dizziness, speech difficulty, weakness, numbness and headaches.   Hematological:  Does not bruise/bleed easily.   Psychiatric/Behavioral:  Negative for agitation, confusion and hallucinations.        Video Exam    There were no vitals filed for this visit.    Physical Exam  HENT:      Head: Normocephalic and atraumatic.      Mouth/Throat:      Mouth: Mucous membranes are moist.   Musculoskeletal:         General: Normal range of motion.      Cervical back: Normal range of motion.   Skin:     General: Skin is warm and dry.   Neurological:      Mental Status: She is alert and oriented to person, place, and time.      Comments: Appears fatigued today.   Psychiatric:         Mood and Affect: Mood normal.         Behavior: Behavior normal.         Thought Content: Thought content normal.         Judgment: Judgment normal.          Visit Time  Total Visit Duration: 15

## 2024-04-15 ENCOUNTER — TELEPHONE (OUTPATIENT)
Age: 49
End: 2024-04-15

## 2024-04-15 ENCOUNTER — TELEPHONE (OUTPATIENT)
Dept: FAMILY MEDICINE CLINIC | Facility: CLINIC | Age: 49
End: 2024-04-15

## 2024-04-15 DIAGNOSIS — F41.9 ANXIETY: ICD-10-CM

## 2024-04-15 DIAGNOSIS — E78.5 DYSLIPIDEMIA, GOAL LDL BELOW 70: ICD-10-CM

## 2024-04-15 DIAGNOSIS — I42.8 NON-ISCHEMIC CARDIOMYOPATHY (HCC): ICD-10-CM

## 2024-04-15 DIAGNOSIS — I10 ESSENTIAL HYPERTENSION: ICD-10-CM

## 2024-04-15 DIAGNOSIS — I20.0 UNSTABLE ANGINA (HCC): ICD-10-CM

## 2024-04-15 RX ORDER — ALPRAZOLAM 1 MG/1
1 TABLET ORAL 2 TIMES DAILY PRN
Qty: 60 TABLET | Refills: 2 | Status: SHIPPED | OUTPATIENT
Start: 2024-04-15

## 2024-04-15 NOTE — TELEPHONE ENCOUNTER
Regarding: Elizabeth Alvarez   Contact: [phone]  ----- Message from Munira Georges LPN sent at 4/12/2024  4:21 PM EDT -----       ----- Message from Elizabeth Alvarez to Velia Montez MD sent at 4/12/2024  4:19 PM -----   Hi dr NICK this is Linden Mccall sister u are my heart dr he passed away march 30th so I am heading back to [place] with my sister my  and I. I am probably gonna need refills and help with my heart pills till I find another heart dr when I get down there we are leaving the 19 of this month I want to Ty for everything u have done for my brother and I u will be missed

## 2024-04-15 NOTE — TELEPHONE ENCOUNTER
Pt wanted to increase the dosage of the ALPRAZolam (XANAX) as she has had death in the family and having a hard time. Please contact pt and advise. Thank you for your help.

## 2024-04-15 NOTE — TELEPHONE ENCOUNTER
Patient called the office and was very upset and tearful. She has had a recent death in the family and additional family issues. Is asking if she could have an increase in her Xannax. She is in the process of looking for a psychiatrist.

## 2024-04-17 RX ORDER — SACUBITRIL AND VALSARTAN 49; 51 MG/1; MG/1
1 TABLET, FILM COATED ORAL 2 TIMES DAILY
Qty: 180 TABLET | Refills: 3 | Status: SHIPPED | OUTPATIENT
Start: 2024-04-17

## 2024-04-17 RX ORDER — AMLODIPINE BESYLATE 5 MG/1
5 TABLET ORAL DAILY
Qty: 90 TABLET | Refills: 3 | Status: SHIPPED | OUTPATIENT
Start: 2024-04-17

## 2024-04-17 RX ORDER — ATORVASTATIN CALCIUM 20 MG/1
20 TABLET, FILM COATED ORAL DAILY
Qty: 90 TABLET | Refills: 3 | Status: SHIPPED | OUTPATIENT
Start: 2024-04-17

## 2024-05-10 DIAGNOSIS — F41.9 ANXIETY: ICD-10-CM

## 2024-05-10 DIAGNOSIS — G47.00 INSOMNIA, UNSPECIFIED TYPE: ICD-10-CM

## 2024-05-10 RX ORDER — ALPRAZOLAM 1 MG/1
1 TABLET ORAL 2 TIMES DAILY PRN
Qty: 60 TABLET | Refills: 2 | Status: SHIPPED | OUTPATIENT
Start: 2024-05-10 | End: 2024-05-14 | Stop reason: SDUPTHER

## 2024-05-10 RX ORDER — ZOLPIDEM TARTRATE 10 MG/1
10 TABLET ORAL
Qty: 30 TABLET | Refills: 5 | Status: SHIPPED | OUTPATIENT
Start: 2024-05-10 | End: 2024-05-13 | Stop reason: SDUPTHER

## 2024-05-13 ENCOUNTER — PATIENT MESSAGE (OUTPATIENT)
Dept: FAMILY MEDICINE CLINIC | Facility: CLINIC | Age: 49
End: 2024-05-13

## 2024-05-13 DIAGNOSIS — I10 ESSENTIAL HYPERTENSION: ICD-10-CM

## 2024-05-13 DIAGNOSIS — H01.001 BLEPHARITIS OF RIGHT UPPER EYELID, UNSPECIFIED TYPE: ICD-10-CM

## 2024-05-13 DIAGNOSIS — K21.9 GASTROESOPHAGEAL REFLUX DISEASE, UNSPECIFIED WHETHER ESOPHAGITIS PRESENT: ICD-10-CM

## 2024-05-13 DIAGNOSIS — I42.8 NON-ISCHEMIC CARDIOMYOPATHY (HCC): ICD-10-CM

## 2024-05-13 DIAGNOSIS — I20.89 MICROVASCULAR ANGINA: ICD-10-CM

## 2024-05-13 DIAGNOSIS — I20.0 UNSTABLE ANGINA (HCC): ICD-10-CM

## 2024-05-13 DIAGNOSIS — F31.9 BIPOLAR 1 DISORDER (HCC): ICD-10-CM

## 2024-05-13 DIAGNOSIS — E78.5 DYSLIPIDEMIA, GOAL LDL BELOW 70: ICD-10-CM

## 2024-05-13 DIAGNOSIS — Z72.0 TOBACCO ABUSE: ICD-10-CM

## 2024-05-13 DIAGNOSIS — F33.41 RECURRENT MAJOR DEPRESSIVE DISORDER, IN PARTIAL REMISSION (HCC): ICD-10-CM

## 2024-05-13 DIAGNOSIS — R00.1 BRADYCARDIA: ICD-10-CM

## 2024-05-13 DIAGNOSIS — G47.00 INSOMNIA, UNSPECIFIED TYPE: ICD-10-CM

## 2024-05-14 ENCOUNTER — TELEPHONE (OUTPATIENT)
Age: 49
End: 2024-05-14

## 2024-05-14 DIAGNOSIS — F41.9 ANXIETY: ICD-10-CM

## 2024-05-14 RX ORDER — ESOMEPRAZOLE MAGNESIUM 40 MG/1
40 CAPSULE, DELAYED RELEASE ORAL DAILY
Qty: 90 CAPSULE | Refills: 1 | Status: SHIPPED | OUTPATIENT
Start: 2024-05-14

## 2024-05-14 RX ORDER — BUPROPION HYDROCHLORIDE 150 MG/1
150 TABLET ORAL EVERY MORNING
Qty: 90 TABLET | Refills: 1 | Status: SHIPPED | OUTPATIENT
Start: 2024-05-14

## 2024-05-14 RX ORDER — ALPRAZOLAM 1 MG/1
1 TABLET ORAL 2 TIMES DAILY PRN
Qty: 60 TABLET | Refills: 2 | Status: SHIPPED | OUTPATIENT
Start: 2024-05-14 | End: 2024-05-15 | Stop reason: SDUPTHER

## 2024-05-14 RX ORDER — PAROXETINE HYDROCHLORIDE 40 MG/1
40 TABLET, FILM COATED ORAL EVERY MORNING
Qty: 90 TABLET | Refills: 1 | Status: SHIPPED | OUTPATIENT
Start: 2024-05-14

## 2024-05-14 NOTE — TELEPHONE ENCOUNTER
Pavel from [facility] called to verify Alprazolam and Zolpidem were sent in and not fraudulent. After review of chart advised Dr Rodriguez did send in.

## 2024-05-14 NOTE — PATIENT COMMUNICATION
Pt called into med refill dept to check on rx for xanax. Advised this was sent to pharmacy 5/10. Pt will call and check with Pharmacy. Wanted Dr. Rodriguez and his team to know she and her SO will miss them very much.

## 2024-05-15 DIAGNOSIS — F41.9 ANXIETY: ICD-10-CM

## 2024-05-15 RX ORDER — SACUBITRIL AND VALSARTAN 49; 51 MG/1; MG/1
1 TABLET, FILM COATED ORAL 2 TIMES DAILY
Qty: 180 TABLET | Refills: 1 | Status: SHIPPED | OUTPATIENT
Start: 2024-05-15

## 2024-05-15 RX ORDER — AMLODIPINE BESYLATE 5 MG/1
5 TABLET ORAL DAILY
Qty: 90 TABLET | Refills: 1 | Status: SHIPPED | OUTPATIENT
Start: 2024-05-15

## 2024-05-15 RX ORDER — ZOLPIDEM TARTRATE 10 MG/1
10 TABLET ORAL
Qty: 90 TABLET | Refills: 0 | Status: SHIPPED | OUTPATIENT
Start: 2024-05-15

## 2024-05-15 RX ORDER — ALPRAZOLAM 1 MG/1
1 TABLET ORAL 2 TIMES DAILY PRN
Qty: 180 TABLET | Refills: 0 | Status: SHIPPED | OUTPATIENT
Start: 2024-05-15

## 2024-05-15 RX ORDER — ATORVASTATIN CALCIUM 20 MG/1
20 TABLET, FILM COATED ORAL DAILY
Qty: 90 TABLET | Refills: 1 | Status: SHIPPED | OUTPATIENT
Start: 2024-05-15

## 2024-05-15 RX ORDER — ERYTHROMYCIN 5 MG/G
0.5 OINTMENT OPHTHALMIC
Qty: 3.5 G | Refills: 0 | Status: SHIPPED | OUTPATIENT
Start: 2024-05-15

## 2024-05-17 ENCOUNTER — TELEPHONE (OUTPATIENT)
Age: 49
End: 2024-05-17

## 2024-05-17 RX ORDER — RANOLAZINE 500 MG/1
500 TABLET, EXTENDED RELEASE ORAL 2 TIMES DAILY
Qty: 180 TABLET | Refills: 3 | Status: SHIPPED | OUTPATIENT
Start: 2024-05-17

## 2024-05-17 NOTE — TELEPHONE ENCOUNTER
Patient is requesting a call back from the office staff.  She refused to provide a reason for the call back.

## 2024-10-15 ENCOUNTER — TELEPHONE (OUTPATIENT)
Age: 49
End: 2024-10-15

## 2024-10-15 NOTE — TELEPHONE ENCOUNTER
Caller: Elizabeth Alvarez    Doctor: Dr. Montez    Call back #: [phone]    Reason for call: Patient called to ask if the office can fax over the catheterization procedure that was completed on 10/24/22. Patient only provided the office's phone number which is [phone].

## 2025-02-27 DIAGNOSIS — I20.0 UNSTABLE ANGINA (HCC): ICD-10-CM

## 2025-02-27 NOTE — TELEPHONE ENCOUNTER
Refill must be reviewed and completed by the office or provider. The refill is unable to be approved or denied by the medication management team.    Last seen 10.2023 - Please review to see if the refill is appropriate.

## 2025-03-02 DIAGNOSIS — I10 ESSENTIAL HYPERTENSION: ICD-10-CM

## 2025-03-14 RX ORDER — METOPROLOL TARTRATE 25 MG/1
25 TABLET, FILM COATED ORAL EVERY 12 HOURS
Qty: 180 TABLET | Refills: 1 | OUTPATIENT
Start: 2025-03-14

## 2025-03-17 RX ORDER — AMLODIPINE BESYLATE 5 MG/1
5 TABLET ORAL DAILY
Qty: 90 TABLET | Refills: 1 | Status: SHIPPED | OUTPATIENT
Start: 2025-03-17 | End: 2025-03-18 | Stop reason: SDUPTHER

## 2025-03-18 RX ORDER — AMLODIPINE BESYLATE 5 MG/1
5 TABLET ORAL DAILY
Qty: 90 TABLET | Refills: 1 | Status: SHIPPED | OUTPATIENT
Start: 2025-03-18

## (undated) DEVICE — GUIDEWIRE WHOLEY HI TORQUE INTERM MOD J.035 260CM

## (undated) DEVICE — DGW .035 FC J3MM 260CM TEF: Brand: EMERALD

## (undated) DEVICE — GLIDESHEATH SLENDER STAINLESS STEEL KIT: Brand: GLIDESHEATH SLENDER

## (undated) DEVICE — RADIFOCUS OPTITORQUE ANGIOGRAPHIC CATHETER: Brand: OPTITORQUE